# Patient Record
Sex: FEMALE | Race: WHITE | NOT HISPANIC OR LATINO | Employment: OTHER | URBAN - METROPOLITAN AREA
[De-identification: names, ages, dates, MRNs, and addresses within clinical notes are randomized per-mention and may not be internally consistent; named-entity substitution may affect disease eponyms.]

---

## 2017-04-28 ENCOUNTER — CONVERSION ENCOUNTER (OUTPATIENT)
Dept: OTHER | Facility: HOSPITAL | Age: 77
End: 2017-04-28

## 2018-04-19 ENCOUNTER — OFFICE VISIT CONVERTED (OUTPATIENT)
Dept: FAMILY MEDICINE CLINIC | Age: 78
End: 2018-04-19
Attending: FAMILY MEDICINE

## 2018-05-16 ENCOUNTER — CONVERSION ENCOUNTER (OUTPATIENT)
Dept: OTHER | Facility: HOSPITAL | Age: 78
End: 2018-05-16

## 2019-05-07 ENCOUNTER — OFFICE VISIT CONVERTED (OUTPATIENT)
Dept: FAMILY MEDICINE CLINIC | Age: 79
End: 2019-05-07
Attending: FAMILY MEDICINE

## 2019-05-07 ENCOUNTER — HOSPITAL ENCOUNTER (OUTPATIENT)
Dept: OTHER | Facility: HOSPITAL | Age: 79
Discharge: HOME | End: 2019-05-07
Attending: FAMILY MEDICINE

## 2019-05-08 LAB
ANION GAP SERPL CALC-SCNC: 20 MMOL/L (ref 8–19)
BUN SERPL-MCNC: 35 MG/DL (ref 5–25)
BUN/CREAT SERPL: 24 {RATIO} (ref 6–20)
CALCIUM SERPL-MCNC: 9.9 MG/DL (ref 8.7–10.4)
CHLORIDE SERPL-SCNC: 104 MMOL/L (ref 99–111)
CONV CO2: 24 MMOL/L (ref 22–32)
CREAT UR-MCNC: 1.47 MG/DL (ref 0.5–0.9)
GFR SERPLBLD BASED ON 1.73 SQ M-ARVRAT: 33 ML/MIN/{1.73_M2}
GLUCOSE SERPL-MCNC: 105 MG/DL (ref 65–99)
MAGNESIUM SERPL-MCNC: 1.95 MG/DL (ref 1.6–2.3)
OSMOLALITY SERPL CALC.SUM OF ELEC: 304 MOSM/KG (ref 273–304)
POTASSIUM SERPL-SCNC: 4.7 MMOL/L (ref 3.5–5.3)
SODIUM SERPL-SCNC: 143 MMOL/L (ref 135–147)

## 2019-05-16 ENCOUNTER — CONVERSION ENCOUNTER (OUTPATIENT)
Dept: CARDIOLOGY | Facility: CLINIC | Age: 79
End: 2019-05-16
Attending: SPECIALIST

## 2019-05-23 ENCOUNTER — HOSPITAL ENCOUNTER (OUTPATIENT)
Dept: OTHER | Facility: HOSPITAL | Age: 79
Discharge: HOME | End: 2019-05-23
Attending: FAMILY MEDICINE

## 2019-05-23 LAB
ANION GAP SERPL CALC-SCNC: 18 MMOL/L (ref 8–19)
APPEARANCE UR: CLEAR
BACTERIA UR QL AUTO: ABNORMAL
BILIRUB UR QL: ABNORMAL
BUN SERPL-MCNC: 18 MG/DL (ref 5–25)
BUN/CREAT SERPL: 16 {RATIO} (ref 6–20)
CALCIUM SERPL-MCNC: 9.5 MG/DL (ref 8.7–10.4)
CASTS URNS QL MICRO: ABNORMAL /[LPF]
CHLORIDE SERPL-SCNC: 105 MMOL/L (ref 99–111)
COLOR UR: ABNORMAL
CONV CO2: 25 MMOL/L (ref 22–32)
CONV LEUKOCYTE ESTERASE: NEGATIVE
CONV UROBILINOGEN IN URINE BY AUTOMATED TEST STRIP: 0.2 {EHRLICHU}/DL (ref 0.1–1)
CREAT UR-MCNC: 1.1 MG/DL (ref 0.5–0.9)
EPI CELLS #/AREA URNS HPF: ABNORMAL /[HPF]
GFR SERPLBLD BASED ON 1.73 SQ M-ARVRAT: 47 ML/MIN/{1.73_M2}
GLUCOSE 24H UR-MCNC: NEGATIVE MG/DL
GLUCOSE SERPL-MCNC: 124 MG/DL (ref 65–99)
HGB UR QL STRIP: ABNORMAL
KETONES UR QL STRIP: NEGATIVE MG/DL
MUCOUS THREADS URNS QL MICRO: ABNORMAL
NITRITE UR-MCNC: NEGATIVE MG/ML
OSMOLALITY SERPL CALC.SUM OF ELEC: 301 MOSM/KG (ref 273–304)
PH UR STRIP.AUTO: 5.5 [PH] (ref 5–8)
POTASSIUM SERPL-SCNC: 4.2 MMOL/L (ref 3.5–5.3)
PROT UR-MCNC: 30 MG/DL
RBC # BLD AUTO: ABNORMAL /[HPF]
SODIUM SERPL-SCNC: 144 MMOL/L (ref 135–147)
SP GR UR STRIP: >=1.03 (ref 1–1.03)
SPECIMEN SOURCE: ABNORMAL
UNIDENT CRYS URNS QL MICRO: ABNORMAL /[HPF]
WBC #/AREA URNS HPF: ABNORMAL /[HPF]

## 2020-01-13 ENCOUNTER — HOSPITAL ENCOUNTER (OUTPATIENT)
Dept: OTHER | Facility: HOSPITAL | Age: 80
Discharge: HOME | End: 2020-01-13
Attending: FAMILY MEDICINE

## 2020-01-13 ENCOUNTER — OFFICE VISIT CONVERTED (OUTPATIENT)
Dept: FAMILY MEDICINE CLINIC | Age: 80
End: 2020-01-13
Attending: FAMILY MEDICINE

## 2020-01-13 LAB
ANION GAP SERPL CALC-SCNC: 19 MMOL/L (ref 8–19)
BUN SERPL-MCNC: 17 MG/DL (ref 5–25)
BUN/CREAT SERPL: 19 {RATIO} (ref 6–20)
CALCIUM SERPL-MCNC: 9.8 MG/DL (ref 8.7–10.4)
CHLORIDE SERPL-SCNC: 101 MMOL/L (ref 99–111)
CONV CO2: 24 MMOL/L (ref 22–32)
CREAT UR-MCNC: 0.91 MG/DL (ref 0.5–0.9)
GFR SERPLBLD BASED ON 1.73 SQ M-ARVRAT: 59 ML/MIN/{1.73_M2}
GLUCOSE SERPL-MCNC: 118 MG/DL (ref 65–99)
OSMOLALITY SERPL CALC.SUM OF ELEC: 293 MOSM/KG (ref 273–304)
POTASSIUM SERPL-SCNC: 4.4 MMOL/L (ref 3.5–5.3)
SODIUM SERPL-SCNC: 140 MMOL/L (ref 135–147)

## 2020-08-14 ENCOUNTER — HOSPITAL ENCOUNTER (OUTPATIENT)
Dept: OTHER | Facility: HOSPITAL | Age: 80
Discharge: HOME | End: 2020-08-14
Attending: PHYSICIAN ASSISTANT

## 2020-08-14 ENCOUNTER — OFFICE VISIT CONVERTED (OUTPATIENT)
Dept: FAMILY MEDICINE CLINIC | Age: 80
End: 2020-08-14
Attending: FAMILY MEDICINE

## 2020-08-14 DIAGNOSIS — G89.29 BILATERAL CHRONIC KNEE PAIN: Primary | ICD-10-CM

## 2020-08-14 DIAGNOSIS — M25.562 BILATERAL CHRONIC KNEE PAIN: Primary | ICD-10-CM

## 2020-08-14 DIAGNOSIS — M25.561 BILATERAL CHRONIC KNEE PAIN: Primary | ICD-10-CM

## 2020-08-14 LAB
ALBUMIN SERPL-MCNC: 4.6 G/DL (ref 3.5–5)
ALBUMIN/GLOB SERPL: 1.7 {RATIO} (ref 1.4–2.6)
ALP SERPL-CCNC: 59 U/L (ref 43–160)
ALT SERPL-CCNC: 13 U/L (ref 10–40)
ANION GAP SERPL CALC-SCNC: 18 MMOL/L (ref 8–19)
AST SERPL-CCNC: 23 U/L (ref 15–50)
BILIRUB SERPL-MCNC: 0.28 MG/DL (ref 0.2–1.3)
BUN SERPL-MCNC: 22 MG/DL (ref 5–25)
BUN/CREAT SERPL: 18 {RATIO} (ref 6–20)
CALCIUM SERPL-MCNC: 10.2 MG/DL (ref 8.7–10.4)
CHLORIDE SERPL-SCNC: 99 MMOL/L (ref 99–111)
CONV CO2: 26 MMOL/L (ref 22–32)
CONV TOTAL PROTEIN: 7.3 G/DL (ref 6.3–8.2)
CREAT UR-MCNC: 1.22 MG/DL (ref 0.5–0.9)
ERYTHROCYTE [DISTWIDTH] IN BLOOD BY AUTOMATED COUNT: 12.4 % (ref 11.5–14.5)
GFR SERPLBLD BASED ON 1.73 SQ M-ARVRAT: 41 ML/MIN/{1.73_M2}
GLOBULIN UR ELPH-MCNC: 2.7 G/DL (ref 2–3.5)
GLUCOSE SERPL-MCNC: 88 MG/DL (ref 65–99)
HBA1C MFR BLD: 12.5 G/DL (ref 12–16)
HCT VFR BLD AUTO: 37.2 % (ref 37–47)
MCH RBC QN AUTO: 30.9 PG (ref 27–31)
MCHC RBC AUTO-ENTMCNC: 33.6 G/DL (ref 33–37)
MCV RBC AUTO: 91.9 FL (ref 81–99)
OSMOLALITY SERPL CALC.SUM OF ELEC: 291 MOSM/KG (ref 273–304)
PLATELET # BLD AUTO: 203 10*3/UL (ref 130–400)
PMV BLD AUTO: 10.7 FL (ref 7.4–10.4)
POTASSIUM SERPL-SCNC: 4 MMOL/L (ref 3.5–5.3)
RBC # BLD AUTO: 4.05 10*6/UL (ref 4.2–5.4)
SODIUM SERPL-SCNC: 139 MMOL/L (ref 135–147)
WBC # BLD AUTO: 6.2 10*3/UL (ref 4.8–10.8)

## 2020-08-17 ENCOUNTER — HOSPITAL ENCOUNTER (OUTPATIENT)
Dept: OTHER | Facility: HOSPITAL | Age: 80
Discharge: HOME | End: 2020-08-17
Attending: FAMILY MEDICINE

## 2020-08-17 NOTE — ADDENDUM NOTE
Addended by: EAGLE BOATENG on: 8/17/2020 03:03 PM     Modules accepted: Orders     EOAE (evoked otoacoustic emission)

## 2020-08-19 ENCOUNTER — OFFICE VISIT (OUTPATIENT)
Dept: ORTHOPEDIC SURGERY | Facility: CLINIC | Age: 80
End: 2020-08-19

## 2020-08-19 VITALS — HEIGHT: 65 IN | TEMPERATURE: 97.5 F | BODY MASS INDEX: 30.16 KG/M2 | WEIGHT: 181 LBS

## 2020-08-19 DIAGNOSIS — M25.561 BILATERAL CHRONIC KNEE PAIN: ICD-10-CM

## 2020-08-19 DIAGNOSIS — M17.12 PRIMARY OSTEOARTHRITIS OF LEFT KNEE: Primary | ICD-10-CM

## 2020-08-19 DIAGNOSIS — M25.562 BILATERAL CHRONIC KNEE PAIN: ICD-10-CM

## 2020-08-19 DIAGNOSIS — M17.11 PRIMARY OSTEOARTHRITIS OF RIGHT KNEE: ICD-10-CM

## 2020-08-19 DIAGNOSIS — G89.29 BILATERAL CHRONIC KNEE PAIN: ICD-10-CM

## 2020-08-19 PROCEDURE — 99204 OFFICE O/P NEW MOD 45 MIN: CPT | Performed by: PHYSICIAN ASSISTANT

## 2020-08-19 NOTE — PROGRESS NOTES
NEW VISIT    Patient: Violetta Starr  ?  YOB: 1940    MRN: [id number]  ?  Chief Complaint   Patient presents with   • Left Knee - Pain, Establish Care   • Right Knee - Pain, Establish Care      ?  HPI:   Violetta Starr is a 80 y.o. female who presents with complaint of bilateral knee pain She reports the left knee is worse than the right knee and progressively worsening. She comes to our office for a second opinion after seeing Dr. Iyer. She had been receiving intraarticular steroid injections every 3 months with some relief of her symptoms, but only lasting for about 2 weeks. She is now experiencing pain in the middle of the night. She states she is also on meloxicam but it is not helping much.     Pain Location: bilateral knee, left greater right  Radiation: medial face of the tibia  Quality: aching, stabbing, burning  Intensity/Severity: moderate to severe  Duration: 4-5 years  Progression of symptoms: yes, progressive worsening  Onset quality: gradual   Timing: intermittent  Aggravating Factors: walking, standing  Alleviating Factors: steroid injection (intra-articular), rest  Previous Episodes: yes  Associated Symptoms: pain, swelling, clicking/popping  ADLs Affected: ambulating, recreational activities/sports  Previous Treatment: NSAIDs, brace and intra-articular injection      This patient is a new patient.  This problem is new to this examiner.      Allergies: No Known Allergies    Medications:   Home Medications:  Current Outpatient Medications on File Prior to Visit   Medication Sig   • ATENOLOL PO Take  by mouth.   • LORazepam (ATIVAN) 0.5 MG tablet Take 0.5 mg by mouth Every 8 (Eight) Hours As Needed for Anxiety.   • meloxicam (MOBIC) 7.5 MG tablet Take 7.5 mg by mouth Daily.     No current facility-administered medications on file prior to visit.      Current Medications:  Scheduled Meds:  PRN Meds:.    I have reviewed the patient's medical history in detail and updated the computerized  "patient record.  Review and summarization of old records include:    Past Medical History:   Diagnosis Date   • Anxiety    • GERD (gastroesophageal reflux disease)    • Hypertension    • Insomnia    • Uterine cancer (CMS/HCC) 06/2016     Past Surgical History:   Procedure Laterality Date   • HYSTERECTOMY       Social History     Occupational History   • Not on file   Tobacco Use   • Smoking status: Never Smoker   • Smokeless tobacco: Never Used   Substance and Sexual Activity   • Alcohol use: Never     Frequency: Never   • Drug use: Never   • Sexual activity: Defer     Family History   Problem Relation Age of Onset   • Cancer Mother    • Hypertension Other          Review of Systems  Constitutional: Negative.  Negative for fever.   Eyes: Negative.    Respiratory: Negative.    Cardiovascular: Negative.    Endocrine: Negative.    Musculoskeletal: Positive for arthralgias, gait problem and joint swelling.   Skin: Negative.  Negative for rash and wound.   Allergic/Immunologic: Negative.    Neurological: Negative for numbness.   Hematological: Negative.    Psychiatric/Behavioral: Negative.         Wt Readings from Last 3 Encounters:   08/19/20 82.1 kg (181 lb)     Ht Readings from Last 3 Encounters:   08/19/20 165.1 cm (65\")     Body mass index is 30.12 kg/m².  Facility age limit for growth percentiles is 20 years.  Vitals:    08/19/20 0936   Temp: 97.5 °F (36.4 °C)         Physical Exam  Constitutional: Patient is oriented to person, place, and time. Appears well-developed and well-nourished.   HENT:   Head: Normocephalic and atraumatic.   Eyes: Conjunctivae and EOM are normal. Pupils are equal, round, and reactive to light.   Cardiovascular: Normal rate and intact distal pulses.   Pulmonary/Chest: Effort normal.   Musculoskeletal:   See detailed exam below   Neurological: Alert and oriented to person, place, and time. No sensory deficit. Coordination normal.   Skin: Skin is warm and dry. Capillary refill takes less " than 2 seconds. No rash noted. No erythema.   Psychiatric: Patient has a normal mood and affect. Her behavior is normal. Judgment and thought content normal.   Nursing note and vitals reviewed.      Ortho Exam:   BILATERAL knee:  There is moderate joint line tenderness at the medial aspect of the knee. The knee has a varus orientation.   Positive for crepitus throughout range of motion.   Positive for mild effusion.  Findings are consistent with synovitis and effusion.    Positive patellar grind test.   Negative Lachman test.    Negative anterior and posterior drawer.  Range of motion in extension and flexion is: 0-110 degrees.  Neurovascular status is intact.  Dorsalis pedis and posterior tibial artery pulses are palpable. Common peroneal nerve function is well preserved.   Gait is cautious and antalgic.        Diagnostics:  Bilateral knee xrays 3 views were ordered by Ajith Knowles PA-C and performed at [facility] on 8/14/2020. These images were independently viewed and interpreted by myself, my impression as follows:  Findings:   Right knee: Advanced medial joint space narrowing with bone-on-bone articulation and osteophyte lipping, moderate degenerative change of lateral and patellofemoral compartments.  Left knee: Moderate to severe tricompartmental osteoarthritis, moderate to severe narrowing of the medial joint space with near bone-on-bone articulation, moderate degenerative change of the lateral joint space, moderate narrowing of patellofemoral compartment.  Bony lesion: no  Soft tissues: within normal limits  Joint spaces: decreased  Hardware appropriately positioned: not applicable  Prior studies available for comparison: yes, right knee from 2015 which shows progression of arthritis from 2015 to today           Assessment:  Violetta was seen today for pain, establish care, pain and establish care.    Diagnoses and all orders for this visit:    Primary osteoarthritis of left  knee  -     External [facility] Surgical/Procedural Request  -     Follow Anesthesia Guidelines / Protocol; Future  -     Obtain informed consent  -     Urinalysis With Culture If Indicated -; Future  -     Care Order / Instruction for all Female Patients    Bilateral chronic knee pain    Primary osteoarthritis of right knee            Plan    Orders Placed This Encounter   Procedures   • External [facility] Surgical/Procedural Request   • Urinalysis With Culture If Indicated -   • Follow Anesthesia Guidelines / Protocol   • Obtain informed consent   • Care Order / Instruction for all Female Patients   · Management of chronic condition with acute exacerbation or progression of symptoms    · Discussion of orthopaedic goals and activities.  · Risk, benefits, and merits of treatment alternatives reviewed with the patient. Discussed continued steroid injections at a higher dose visco supplementation, or proceeding with a total knee arthroplasty. She is interested in having the knee replaced.   The patient was seen today for preoperative discussion.  The patient has been tried on over-the-counter and prescription NSAID's despite the risks of anti-inflammatory bleeding, peptic ulcers and erosive gastritis with short term benefit only.  Braces have been prescribed for mechanical support.  Patient has been participating in an exercise program specifically targeting joint pain relief with limited benefit. Intraarticular injections have been used periodically with some but not complete relief of pain.  Ambulation aids have also been utilized.    · The details of the surgical procedure were explained including the location of probable incisions and a description of the likely hardware/grafts to be used. The patient understands the likely convalescence after surgery as well as the rehabilitation required.  Also, we have thoroughly discussed with the patient the risks, benefits and alternatives to surgery.  Risks  include but are not limited to the risk of infection, joint stiffness, limited range of motion, wound healing problems, scar tissue build up, myocardial infarction, stroke, blood clots (including DVT and/or pulmonary embolus along with the risk of death) neurologic and/or vascular injury, limb length discrepancy, fracture, dislocation, nonunion, malunion, continued pain and need for further surgery including hardware failure requiring revision.   · Ice, heat, and/or modalities as beneficial  · The nature of the proposed surgery reviewed with the patient including risks, benefits, rehabilitation, recovery timeframe, and outcome expectations.      Ajith Knowles PA-C  Date of encounter: 08/19/2020     Electronically signed by Ajith Knowles PA-C, 08/19/20, 9:39 AM.    EMR Dragon/Transcription disclaimer:  Much of this encounter note is an electronic transcription/translation of spoken language to printed text. The electronic translation of spoken language may permit erroneous, or at times, nonsensical words or phrases to be inadvertently transcribed; Although I have reviewed the note for such errors, some may still exist.

## 2020-08-21 ENCOUNTER — HOSPITAL ENCOUNTER (OUTPATIENT)
Dept: OTHER | Facility: HOSPITAL | Age: 80
Discharge: HOME | End: 2020-08-21
Attending: FAMILY MEDICINE

## 2020-08-24 RX ORDER — LORAZEPAM 0.5 MG/1
0.5 TABLET ORAL EVERY 8 HOURS PRN
COMMUNITY
End: 2022-06-16 | Stop reason: SDUPTHER

## 2020-08-24 RX ORDER — MELOXICAM 7.5 MG/1
7.5 TABLET ORAL DAILY
COMMUNITY
End: 2020-10-28

## 2020-08-27 PROBLEM — G89.29 BILATERAL CHRONIC KNEE PAIN: Status: ACTIVE | Noted: 2020-08-27

## 2020-08-27 PROBLEM — M17.12 PRIMARY OSTEOARTHRITIS OF LEFT KNEE: Status: ACTIVE | Noted: 2020-08-27

## 2020-08-27 PROBLEM — M17.11 PRIMARY OSTEOARTHRITIS OF RIGHT KNEE: Status: ACTIVE | Noted: 2020-08-27

## 2020-08-27 PROBLEM — M25.561 BILATERAL CHRONIC KNEE PAIN: Status: ACTIVE | Noted: 2020-08-27

## 2020-08-27 PROBLEM — M25.562 BILATERAL CHRONIC KNEE PAIN: Status: ACTIVE | Noted: 2020-08-27

## 2020-10-19 ENCOUNTER — OUTSIDE FACILITY SERVICE (OUTPATIENT)
Dept: ORTHOPEDIC SURGERY | Facility: CLINIC | Age: 80
End: 2020-10-19

## 2020-10-19 PROCEDURE — 27447 TOTAL KNEE ARTHROPLASTY: CPT | Performed by: ORTHOPAEDIC SURGERY

## 2020-10-28 DIAGNOSIS — Z96.652 S/P TKR (TOTAL KNEE REPLACEMENT), LEFT: Primary | ICD-10-CM

## 2020-10-28 PROBLEM — C54.1 ENDOMETRIAL CANCER: Status: ACTIVE | Noted: 2020-10-28

## 2020-10-28 RX ORDER — ATENOLOL 50 MG/1
25 TABLET ORAL DAILY
COMMUNITY
Start: 2020-08-16 | End: 2021-07-08 | Stop reason: SDUPTHER

## 2020-10-28 RX ORDER — TRAZODONE HYDROCHLORIDE 50 MG/1
50 TABLET ORAL NIGHTLY
COMMUNITY
Start: 2020-08-14 | End: 2021-07-08

## 2020-10-28 RX ORDER — OXYCODONE HYDROCHLORIDE AND ACETAMINOPHEN 5; 325 MG/1; MG/1
TABLET ORAL
COMMUNITY
Start: 2020-10-20 | End: 2020-10-30 | Stop reason: SDUPTHER

## 2020-10-28 RX ORDER — AMLODIPINE BESYLATE 5 MG/1
5 TABLET ORAL DAILY
COMMUNITY
Start: 2020-09-01 | End: 2021-07-08 | Stop reason: SDUPTHER

## 2020-10-28 RX ORDER — MELOXICAM 15 MG/1
7.5 TABLET ORAL DAILY
COMMUNITY
Start: 2020-08-24 | End: 2021-07-08 | Stop reason: SDUPTHER

## 2020-10-28 RX ORDER — RIVAROXABAN 10 MG/1
TABLET, FILM COATED ORAL
COMMUNITY
Start: 2020-10-27 | End: 2021-07-08

## 2020-10-28 RX ORDER — LOSARTAN POTASSIUM 100 MG/1
100 TABLET ORAL DAILY
COMMUNITY
Start: 2020-08-14 | End: 2021-07-08 | Stop reason: SDUPTHER

## 2020-10-28 RX ORDER — OMEPRAZOLE 40 MG/1
40 CAPSULE, DELAYED RELEASE ORAL DAILY
COMMUNITY
Start: 2020-08-14 | End: 2021-07-08 | Stop reason: SDUPTHER

## 2020-10-30 ENCOUNTER — OFFICE VISIT (OUTPATIENT)
Dept: ORTHOPEDIC SURGERY | Facility: CLINIC | Age: 80
End: 2020-10-30

## 2020-10-30 ENCOUNTER — HOSPITAL ENCOUNTER (OUTPATIENT)
Dept: OTHER | Facility: HOSPITAL | Age: 80
Discharge: HOME | End: 2020-10-30
Attending: PHYSICIAN ASSISTANT

## 2020-10-30 VITALS — BODY MASS INDEX: 29.99 KG/M2 | WEIGHT: 180 LBS | HEIGHT: 65 IN | TEMPERATURE: 98 F

## 2020-10-30 DIAGNOSIS — Z96.652 S/P TKR (TOTAL KNEE REPLACEMENT), LEFT: Primary | ICD-10-CM

## 2020-10-30 PROCEDURE — 99024 POSTOP FOLLOW-UP VISIT: CPT | Performed by: PHYSICIAN ASSISTANT

## 2020-10-31 RX ORDER — OXYCODONE HYDROCHLORIDE AND ACETAMINOPHEN 5; 325 MG/1; MG/1
TABLET ORAL
Qty: 40 TABLET | Refills: 0 | Status: SHIPPED | OUTPATIENT
Start: 2020-10-31 | End: 2020-11-20 | Stop reason: SDUPTHER

## 2020-11-03 PROBLEM — Z96.652 S/P TKR (TOTAL KNEE REPLACEMENT), LEFT: Status: ACTIVE | Noted: 2020-11-03

## 2020-11-03 NOTE — PROGRESS NOTES
POST OPERATIVE FOLLOW UP ([facility])      NAME: Violetta Starr           : 1940    MRN: [id number]      Chief Complaint   Patient presents with   • Left Knee - Follow-up, Pain   • Post-op Knee     Date of Surgery: 10/19/2029  ?     HPI  Violetta Starr presents for 11 day postoperative follow up s/p left total knee arthroplasty performed by Charan Payne MD. The patient has had a relatively normal postoperative course.  The patient has had no current complaints. The patient has had improving normal postoperative pain.  The patient has had no issues with the wound.         Physical Exam  General: alert, appears stated age, cooperative and no distress  Incision/Skin: healing well, no drainage, no erythema, no seroma, no swelling, incision well approximated  Musculoskeletal:   Calf is soft and nontender with a negative Homans sign. Appropriate amounts of swelling and bruising are noted.  There is no clicking, popping or catching. There is no instability of the components.   Anterior and posterior drawer signs are negative.   Dorsalis pedis and posterior tibial artery pulses are palpable.   Common peroneal nerve function is well preserved.   Range of motion is 0-75 degrees of flexion.  Gait is cautious but otherwise fairly normal.       Diagnostic Data:  Left knee xrays 3 views were ordered by Ajith Knowles PA-C and performed at [facility] on 10/30/20. These images were independently viewed and interpreted by myself, my impression as follows:  Findings: Well positioned implant with good cement mantle  Bony Lesion: No  Soft tissues: within normal limits  Joint spaces: WNL  Hardware appropriately positioned: yes  Prior studies available for comparison: yes          Assessment/Plan   Assessment:    1. S/P TKR (total knee replacement), left          Plan:       • Wound care instructions given  • Ice and/or modalities as beneficial  • Watch for signs and symptoms of infection  • Elevate leg for  residual swelling  • Physical therapy program ongoing  • Weight bearing parameters reviewed  • Take prescribed medications as instructed, but only as tolerated  • Aftercare and dental prophylaxis for joint replacement surgery.  • Discussion of orthopaedic goals and activities and patient and/or guardian expressed appreciation.  • Follow up in 4-6 weeks       Ajith Knowles PA-C  10/30/2020     Electronically signed by Ajith Knowles PA-C, 11/03/20, 8:08 AM EST.    EMR Dragon/Transcription disclaimer:  Much of this encounter note is an electronic transcription/translation of spoken language to printed text. The electronic translation of spoken language may permit erroneous, or at times, nonsensical words or phrases to be inadvertently transcribed; Although I have reviewed the note for such errors, some may still exist.

## 2020-11-20 DIAGNOSIS — Z96.652 S/P TKR (TOTAL KNEE REPLACEMENT), LEFT: ICD-10-CM

## 2020-11-21 RX ORDER — OXYCODONE HYDROCHLORIDE AND ACETAMINOPHEN 5; 325 MG/1; MG/1
TABLET ORAL
Qty: 40 TABLET | Refills: 0 | Status: SHIPPED | OUTPATIENT
Start: 2020-11-21 | End: 2021-07-08

## 2020-12-10 ENCOUNTER — OFFICE VISIT (OUTPATIENT)
Dept: ORTHOPEDIC SURGERY | Facility: CLINIC | Age: 80
End: 2020-12-10

## 2020-12-10 VITALS — BODY MASS INDEX: 26.99 KG/M2 | TEMPERATURE: 96.3 F | HEIGHT: 65 IN | WEIGHT: 162 LBS

## 2020-12-10 DIAGNOSIS — Z96.652 S/P TKR (TOTAL KNEE REPLACEMENT), LEFT: Primary | ICD-10-CM

## 2020-12-10 PROCEDURE — 99024 POSTOP FOLLOW-UP VISIT: CPT | Performed by: ORTHOPAEDIC SURGERY

## 2020-12-10 RX ORDER — INFLUENZA A VIRUS A/MICHIGAN/45/2015 X-275 (H1N1) ANTIGEN (FORMALDEHYDE INACTIVATED), INFLUENZA A VIRUS A/SINGAPORE/INFIMH-16-0019/2016 IVR-186 (H3N2) ANTIGEN (FORMALDEHYDE INACTIVATED), INFLUENZA B VIRUS B/PHUKET/3073/2013 ANTIGEN (FORMALDEHYDE INACTIVATED), AND INFLUENZA B VIRUS B/MARYLAND/15/2016 BX-69A ANTIGEN (FORMALDEHYDE INACTIVATED) 60; 60; 60; 60 UG/.7ML; UG/.7ML; UG/.7ML; UG/.7ML
INJECTION, SUSPENSION INTRAMUSCULAR
COMMUNITY
Start: 2020-11-11 | End: 2021-09-21

## 2020-12-10 NOTE — PROGRESS NOTES
"POST OP TOTAL [facility]LOBAL      NAME: Violetta Starr           : 1940    MRN: [id number]    Chief Complaint   Patient presents with   • Left Knee - Follow-up, Post-op       Date of Surgery: 10/19/2020  ?  HPI:   Patient returns today for 8 week follow up of left total knee arthroplasty Incision(s) healed nicely with no signs of infection. Patient reports doing well with no unusual complaints. No fevers, rigors or chills. Appears to be progressing appropriately. Patient is on appropriate anticoagulation.  She has done extremely well postoperatively.  \"You did a great job on my knee \".  She states that she is looking forward to walking for exercise.  She has completed her course of physical therapy at this point.      Review of Systems:      Ortho Exam:   Left knee.The patient is status post total knee arthroplasty postoperative 8 week(s). Incision is clean. Calf is soft and nontender. Homans sign is negative. There is no clicking, popping or catching. Anterior and posterior drawer signs are negative.  There is no instability of the components. Appropriate amounts of swelling and bruising are noted. Dorsalis pedis and posterior tibial artery pulses are palpable. Common peroneal nerve function is well preserved. Range of motion is from 0-100 degrees of flexion. Gait is cautious but otherwise fairly normal. There is no evidence of a deep seated joint infection.      Diagnostic Studies:  no diagnostic testing performed this visit        Assessment:  Diagnoses and all orders for this visit:    1. S/P TKR (total knee replacement), left (Primary)            Plan     · To use ACE wrap/TEO stocking  · Continue ice to joint   · Stretching and strengthening exercises  · Aggressive ROM  · Falls precautions  · You may now resume any prescription medications you were taking prior to surgery  · Continue with lifelong antibiotic prophylaxis with dental procedures following total joint replacement.  · Follow up in 1 year(s)    Date " of encounter: 12/10/2020   Charan Payne MD

## 2021-03-08 ENCOUNTER — HOSPITAL ENCOUNTER (OUTPATIENT)
Dept: VACCINE CLINIC | Facility: HOSPITAL | Age: 81
Discharge: HOME | End: 2021-03-08
Attending: INTERNAL MEDICINE

## 2021-03-15 ENCOUNTER — OFFICE VISIT CONVERTED (OUTPATIENT)
Dept: FAMILY MEDICINE CLINIC | Age: 81
End: 2021-03-15
Attending: FAMILY MEDICINE

## 2021-03-15 ENCOUNTER — HOSPITAL ENCOUNTER (OUTPATIENT)
Dept: OTHER | Facility: HOSPITAL | Age: 81
Discharge: HOME | End: 2021-03-15
Attending: FAMILY MEDICINE

## 2021-03-15 LAB
ALBUMIN SERPL-MCNC: 4.4 G/DL (ref 3.5–5)
ALBUMIN/GLOB SERPL: 1.6 {RATIO} (ref 1.4–2.6)
ALP SERPL-CCNC: 65 U/L (ref 43–160)
ALT SERPL-CCNC: 10 U/L (ref 10–40)
ANION GAP SERPL CALC-SCNC: 14 MMOL/L (ref 8–19)
APPEARANCE UR: CLEAR
AST SERPL-CCNC: 20 U/L (ref 15–50)
BACTERIA UR QL AUTO: ABNORMAL
BILIRUB SERPL-MCNC: 0.26 MG/DL (ref 0.2–1.3)
BILIRUB UR QL: NEGATIVE
BUN SERPL-MCNC: 23 MG/DL (ref 5–25)
BUN/CREAT SERPL: 20 {RATIO} (ref 6–20)
CALCIUM SERPL-MCNC: 9.8 MG/DL (ref 8.7–10.4)
CASTS URNS QL MICRO: ABNORMAL /[LPF]
CHLORIDE SERPL-SCNC: 106 MMOL/L (ref 99–111)
COLOR UR: YELLOW
CONV CO2: 26 MMOL/L (ref 22–32)
CONV LEUKOCYTE ESTERASE: NEGATIVE
CONV TOTAL PROTEIN: 7.2 G/DL (ref 6.3–8.2)
CONV UROBILINOGEN IN URINE BY AUTOMATED TEST STRIP: 0.2 {EHRLICHU}/DL (ref 0.1–1)
CREAT UR-MCNC: 1.16 MG/DL (ref 0.5–0.9)
EPI CELLS #/AREA URNS HPF: ABNORMAL /[HPF]
GFR SERPLBLD BASED ON 1.73 SQ M-ARVRAT: 44 ML/MIN/{1.73_M2}
GLOBULIN UR ELPH-MCNC: 2.8 G/DL (ref 2–3.5)
GLUCOSE 24H UR-MCNC: NEGATIVE MG/DL
GLUCOSE SERPL-MCNC: 93 MG/DL (ref 65–99)
HGB UR QL STRIP: ABNORMAL
KETONES UR QL STRIP: NEGATIVE MG/DL
MUCOUS THREADS URNS QL MICRO: ABNORMAL
NITRITE UR-MCNC: NEGATIVE MG/ML
OSMOLALITY SERPL CALC.SUM OF ELEC: 297 MOSM/KG (ref 273–304)
PH UR STRIP.AUTO: 5.5 [PH] (ref 5–8)
POTASSIUM SERPL-SCNC: 4 MMOL/L (ref 3.5–5.3)
PROT UR-MCNC: NEGATIVE MG/DL
RBC # BLD AUTO: ABNORMAL /[HPF]
SODIUM SERPL-SCNC: 142 MMOL/L (ref 135–147)
SP GR UR STRIP: >=1.03 (ref 1–1.03)
SPECIMEN SOURCE: ABNORMAL
UNIDENT CRYS URNS QL MICRO: ABNORMAL /[HPF]
WBC #/AREA URNS HPF: ABNORMAL /[HPF]

## 2021-03-29 ENCOUNTER — HOSPITAL ENCOUNTER (OUTPATIENT)
Dept: VACCINE CLINIC | Facility: HOSPITAL | Age: 81
Discharge: HOME | End: 2021-03-29
Attending: INTERNAL MEDICINE

## 2021-05-18 NOTE — PROGRESS NOTES
Violetta Starr 1940     Office/Outpatient Visit    Visit Date: Tue, May 7, 2019 11:01 am    Provider: Antoni Gonzalez MD (Assistant: Heather Russ MA)    Location: [facility]        Electronically signed by Antoni Gonzalez MD on  05/08/2019 04:52:01 PM                             SUBJECTIVE:        CC: physical exam, blood pressure, GERD, anxiety         HPI:         Ms. Starr is here for a Medicare wellness visit.  The required HRA questions are integrated within this visit note. Family medical history and individual medical/surgical history were reviewed and updated.  A current height, weight, BMI, blood pressure, and pulse were recorded in the vitals section of the note and have been reviewed. Patient's medications, including supplements, were recorded in the chart and reviewed.  Current providers and suppliers were reviewed and updated.          Self-Assessment of Health: She rates her health as very good. She rates her confidence of being able to control/manage most of her health problems as very confident. Her physical/emotional health has limited her social activites not at all.  A review of cognitive impairment was performed, including ability to drive a car, manage finances, and any memory changes, and was found to be negative.  A review of functional ability, including bathing, dressing, walking, and urine/bowel continence as well as level of safety was performed and was found to be negative.  Falls Risk: Has fallen 2 or more times or had one fall with injury in the past year.  In regards to hearing she reports Concerning home safety, she reports that at home she DOES have adequate lighting, a skid resistant shower/tub, grab bars in the bath, handrails on stairs and functioning smoke alarms, but not absence of throw rugs.          Immunization Status: ** >10 years since last Td booster; ** Has not received influenza vaccine for this season; Age>60, no shingles vaccination; Physical  Activity: She never excercises.; Type of diet patient normally eats is described as well-balanced with fruits and vegetables         Violetta also has GERD for which she is on Pepcid and Prilosec.  She says that her symptoms continue to be well controlled.  She denies dysphagia.  She does well as long as she takes the medications and is cautious about her diet.          Violetta also has anxiety for which she takes Ativan rarely.  She has used that for a many years and uses it on a very limited basis.  She has not had any sign of addiction or abuse of that medication.  She would like to get it refilled.  It is helpful for her.  No concerning side effects, and risks are reviewed.  Christiano is consistent today.          With regard to the hypertension, her current cardiac medication regimen includes a beta-blocker ( Tenormin ) and an angiotensin receptor blocker ( Cozaar ).  She is tolerating the medication well without side effects.  Compliance with treatment has been good; she takes her medication as directed and follows up as directed.      ROS:     CONSTITUTIONAL:  Negative for chills and fever.      CARDIOVASCULAR:  Negative for chest pain and palpitations.      RESPIRATORY:  Negative for recent cough and dyspnea.      GASTROINTESTINAL:  Negative for abdominal pain, nausea and vomiting.      INTEGUMENTARY/BREAST:  Negative for atypical mole(s) and rash.          PMH/FMH/SH:     Last Reviewed on 5/07/2019 11:19 AM by Antoni Gonzalez    Past Medical History:                 PAST MEDICAL HISTORY         Hypertension     Gastroesophageal Reflux Disease     Endometrial cancer: S/P surgical resection; vaginal brachytherapy radiation complete 8/5/16;     Anxiety         CURRENT MEDICAL PROVIDERS:    Obstetrician/Gynecologist: Dr. Bernal    Oncologist: [facility]    Ophthalmologist: Wiggio (mekhi)    Orthopedist: Jaylon             ADVANCE DIRECTIVES: Living will  (Sam) or daughter  (Pam Duke)         PREVENTIVE HEALTH MAINTENANCE             BONE DENSITY: was last done 04/2016 with normal results     COLORECTAL CANCER SCREENING: Up to date (colonoscopy q10y; sigmoidoscopy q5y; Cologuard q3y) was last done 03/2014, Results are in chart; colonoscopy with normal results     EYE EXAM: was last done 4/21/2015 (Dr. 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          Alcohol:  Does not drink alcohol and never has.          Substance Abuse History:     Last Reviewed on 5/07/2019 11:19 AM by Antoni Gonzalez    NEGATIVE         Mental Health History:     Last Reviewed on 5/07/2019 11:19 AM by Antoni Gonzalez        Communicable Diseases (eg STDs):     Last Reviewed on 5/07/2019 11:19 AM by Antoni Gonzalez            Current Problems:     Last Reviewed on 5/07/2019 11:19 AM by Antoni Gonzalez    Mixed hyperlipidemia     Uterine cancer, of corpus uteri     Microscopic hematuria     Use of high risk medications     Anxiety     GERD     Hypertension         Immunizations:     zzPrevnar-13 3/13/2015     Pneomovax 2/11/2014     Influenza Virus Vaccine, unspecified formulation 1/1/2018         Allergies:     Last Reviewed on 5/07/2019 11:19 AM by Antoni Gonzalez    Amlodipine/Benazepril: cough    Losartan/Hydrochlorothiazide: cough    Lisinopril/Hydrochlorothiazide: cough    Brynn: cough        Current Medications:      Last Reviewed on 5/07/2019 11:19 AM by Antoni Gonzalez    Atenolol 50mg Tablet Take 1 tablet(s) by mouth daily     Cozaar 100mg Tablet Take 1 tablet(s) by mouth daily     Omeprazole 40mg Capsules, Extended Release Take 1 capsule(s) by mouth daily     Famotidine 20mg Tablet Take 1 tablet(s) by mouth bid     Lorazepam 0.5mg Tablet Take one tablet daily PRN     Trazodone HCl 50mg Tablet Take 1/2 to 1 tablet(s) by mouth at bedtime     Diphenhydramine HCl 25mg Tablet     magnesium 250mg daily     Meloxicam 15mg Tablet 1 tab daily     potassium 99mg daily     Calcium 600mg 1 tab daily         OBJECTIVE:        Vitals:         Current: 5/7/2019 11:05:09 AM    Ht:  5 ft, 4.5 in;  Wt: 175.2 lbs;  BMI: 29.6    T: 98 F (oral);  BP: 121/65 mm Hg (left arm, sitting);  P: 72 bpm (left arm (BP Cuff), sitting);  sCr: 1.04 mg/dL;  GFR: 46.51        Exams:     PHYSICAL EXAM:     GENERAL: vital signs recorded - well developed, well nourished;  no apparent distress;     EYES: extraocular movements intact; conjunctiva and cornea are normal; PERRLA; binocular vision is 20/20 - hearing is normal     E/N/T:  normal EACs, TMs, nasal/oral mucosa, teeth, gingiva, and oropharynx;     NECK: range of motion is normal; thyroid is non-palpable;     RESPIRATORY: normal respiratory rate and pattern with no distress; normal breath sounds with no rales, rhonchi, wheezes or rubs;     CARDIOVASCULAR: normal rate; rhythm is regular;  no systolic murmur; no edema;     GASTROINTESTINAL: nontender; normal bowel sounds; no organomegaly;     LYMPHATIC: no enlargement of cervical or facial nodes; no supraclavicular nodes;     BREAST/INTEGUMENT: SKIN: no significant rashes or lesions; no suspicious moles;     NEUROLOGIC: mental status: alert and oriented x 3; cranial nerves II-XII grossly intact;     PSYCHIATRIC: appropriate affect and demeanor; normal psychomotor function;         Lab/Test Results:             Amphetamines Screen, Urin:  Negative  (05/07/2019),     BAR-Barbiturates Screen, Urin:  Negative (05/07/2019),     Buprenorphine:  Negative (05/07/2019),     BZO-Benzodiazepines Screen,Ur:  Positive (05/07/2019),     Cocaine(Metab.)Screen, Ur:  Negative (05/07/2019),     MDMA-Ecstasy:  Negative (05/07/2019),     Met-Methamphetamine:  Negative (05/07/2019),     MTD-Methadone Screen, Urine:  Negative (05/07/2019),     Opiate Screen, Urine:  Negative (05/07/2019),     OXY-Oxycodone:  Negative (05/07/2019),     PCP-Phencyclidine Screen, Uri:  Negative (05/07/2019),     THC Cannabinoids Screen, Urin:  Negative (05/07/2019),     Urine temperature:  confirmed (05/07/2019),     Date and time of last pill:  lorazepam-5/6/19 @ 8pm/amyh (05/07/2019),     Performed by:  pr (05/07/2019),     Collection Time:  12:50 (05/07/2019),             ASSESSMENT           V70.0   Z00.01  Yearly physical exam              DDx:     530.81   K21.9  GERD              DDx:     V58.69   Z79.899  Use of high risk medications              DDx:     300.02   F41.8  Anxiety              DDx:     401.1   I10  Hypertension              DDx:     780.4   R42  Dizziness              DDx:         ORDERS:         Meds Prescribed:       Refill of: Atenolol 50mg Tablet Take 1 tablet(s) by mouth daily  #90 (Ninety) tablet(s) Refills: 3       Refill of: Cozaar (Losartan) 100mg Tablet Take 1 tablet(s) by mouth daily  #90 (Ninety) tablet(s) Refills: 3       Refill of: Omeprazole 40mg Capsules, Extended Release Take 1 capsule(s) by mouth daily  #90 (Ninety) capsule(s) Refills: 3       Refill of: Famotidine 20mg Tablet Take 1 tablet(s) by mouth bid  #180 (One [place] and Eighty) tablet(s) Refills: 3       Refill of: Lorazepam 0.5mg Tablet Take one tablet daily PRN  #30 (Thirty) tablet(s) Refills: 1       Refill of: Trazodone HCl 50mg Tablet Take 1/2 to 1 tablet(s) by mouth at bedtime  #90 (Ninety) tablet(s) Refills: 3         Radiology/Test Orders:       96991  Holter monitor connection and  disconnection  (Send-Out)  (Pt will come back for holter/amyh)       3014F  Screening mammography results documented and reviewed (PV)1  (In-House)         3017F  Colorectal CA screen results documented and reviewed (PV)  (In-House)           Lab Orders:       95098  BMP - [facility] Basic Metabolic Panel  (Send-Out)         83161  MG - [facility] Magnesium, Serum  (Send-Out)         81998  Drug test prsmv qual dir optical obs per day  (In-House)           Other Orders:         Depression screen negative  (In-House)         1100F  Pt screen for fall risk; document 2+ falls in the past yr or any fall w/injury in past year (DIAMOND)  (In-House)           Negative EtOH screen  (In-House)           Calculated BMI above the upper parameter and a follow-up plan was documented in the medical record  (In-House)           Subsequent Annual Well Visit Medicare (x1)                 PLAN:          Yearly physical exam         RECOMMENDATIONS given include: Today, we have reviewed Violetta's care.  I'm going to refill her medications and arrange additional testing as noted.  She has had some rare issue with feeling cold and clammy and light-headed.  We will arrange a Holter as a precaution.  Consider whether we need to reconsider the use of the beta blocker..  MIPS PHQ-9 Depression Screening Completed form scanned and in chart; Total Score 4 Negative alcohol screen     MAMMOGRAM: Done within last 2 years and results in are chart     COLORECTAL CANCER SCREENING: Results are in chart     BMI Elevated - Follow-Up Plan: She was provided education on weight loss strategies           Orders:         Depression screen negative  (In-House)         1100F  Pt screen for fall risk; document 2+ falls in the past yr or any fall w/injury in past year (DIAMOND)  (In-House)           Negative EtOH screen  (In-House)         3014F  Screening mammography results documented and reviewed (PV)1  (In-House)         3017F  Colorectal CA screen  results documented and reviewed (PV)  (In-House)           Calculated BMI above the upper parameter and a follow-up plan was documented in the medical record  (In-House)             Patient Education Handouts:       Physical Exam 60+ year, Female           GERD           Prescriptions:       Refill of: Omeprazole 40mg Capsules, Extended Release Take 1 capsule(s) by mouth daily  #90 (Ninety) capsule(s) Refills: 3       Refill of: Famotidine 20mg Tablet Take 1 tablet(s) by mouth bid  #180 (One [place] and Eighty) tablet(s) Refills: 3          Use of high risk medications As above.     LABORATORY:  Labs ordered to be performed today include Drug screen.            Orders:       [id number]  Drug test prsmv qual dir optical obs per day  (In-House)            Anxiety           Prescriptions:       Refill of: Lorazepam 0.5mg Tablet Take one tablet daily PRN  #30 (Thirty) tablet(s) Refills: 1       Refill of: Trazodone HCl 50mg Tablet Take 1/2 to 1 tablet(s) by mouth at bedtime  #90 (Ninety) tablet(s) Refills: 3          Hypertension     LABORATORY:  Labs ordered to be performed today include basic metabolic panel and Magnesium level.            Prescriptions:       Refill of: Atenolol 50mg Tablet Take 1 tablet(s) by mouth daily  #90 (Ninety) tablet(s) Refills: 3       Refill of: Cozaar (Losartan) 100mg Tablet Take 1 tablet(s) by mouth daily  #90 (Ninety) tablet(s) Refills: 3           Orders:       68050  BMP - [facility] Basic Metabolic Panel  (Send-Out)         15646  MG - [facility] Magnesium, Serum  (Send-Out)            Dizziness         TESTS/PROCEDURES:  Will proceed with Holter Monitor 24 hour to be performed/scheduled now.            Orders:       [id number]  Holter monitor connection and disconnection  (Send-Out)  (Pt will come back for holter/[facility])             CHARGE CAPTURE           **Please note: ICD descriptions below are intended for billing purposes only and may not represent clinical diagnoses**        Primary Diagnosis:          V70.0 Yearly physical exam            Z00.01    Encounter for general adult medical examination with abnormal findings              Orders:          03772   Preventive medicine, established patient, age 65+ years  (In-House)                                           Subsequent Annual Well Visit Medicare (x1)              Depression screen negative  (In-House)             1100F   Pt screen for fall risk; document 2+ falls in the past yr or any fall w/injury in past year (DIAMOND)  (In-House)                Negative EtOH screen  (In-House)             3014F   Screening mammography results documented and reviewed (PV)1  (In-House)             3017F   Colorectal CA screen results documented and reviewed (PV)  (In-House)                Calculated BMI above the upper parameter and a follow-up plan was documented in the medical record  (In-House)           530.81 GERD            K21.9    Gastro-esophageal reflux disease without esophagitis              Orders:          17016 -25  Office/outpatient visit; established patient, level 4  (In-House)           V58.69 Use of high risk medications            Z79.899    Other long term (current) drug therapy              Orders:          66645   Drug test prsmv qual dir optical obs per day  (In-House)           300.02 Anxiety            F41.8    Other specified anxiety disorders    401.1 Hypertension            I10    Essential (primary) hypertension    780.4 Dizziness            R42    Dizziness and giddiness

## 2021-05-18 NOTE — PROGRESS NOTES
Violetta Starr  1940     Office/Outpatient Visit    Visit Date: Fri, Aug 14, 2020 10:26 am    Provider: Antoni Gonzalez MD (Assistant: Kadi Reddy RN)    Location: [facility]        Electronically signed by Antoni Gonzalez MD on  08/14/2020 01:26:16 PM                             Subjective:        CC: physical exam, blood pressure, anxiety    HPI:           Ms. Starr is here for a Medicare wellness visit.  The required HRA questions are integrated within this visit note. Family medical history and individual medical/surgical history were reviewed and updated.  A current height, weight, BMI, blood pressure, and pulse were recorded in the vitals section of the note and have been reviewed. Patient's medications, including supplements, were recorded in the chart and reviewed.  Current providers and suppliers were reviewed and updated.          Self-Assessment of Health: She rates her health as excellent. She rates her confidence of being able to control/manage most of her health problems as very confident. Her physical/emotional health has limited her social activites moderately.  A review of cognitive impairment was performed, including ability to drive a car, manage finances, and any memory changes, and was found to be negative.  A review of functional ability, including bathing, dressing, walking, and urine/bowel continence as well as level of safety was performed and was found to be negative.  Falls Risk: Has fallen 2 or more times or had one fall with injury in the past year.  In regard to hearing, she reports having trouble hearing the TV/radio when others do not and having to strain to hear or understand conversations, but not wearing hearing aid(s).  Concerning home safety, she reports that at home she DOES have adequate lighting, a skid resistant shower/tub, grab bars in the bath, handrails on stairs and functioning smoke alarms, but not absence of throw rugs.           Immunization Status: ** >10 years since last Td booster; Age>60, no shingles vaccination; Physical Activity: She never excercises.; Type of diet patient normally eats is described as well-balanced with fruits and vegetables Tobacco: She has never smoked.            Violetta also has anxiety for which she takes Ativan on a sparing basis.  She has not had any sign of addiction or abuse of that medication.  She would like to get it refilled.  It is helpful for her.  No concerning side effects, and risks are reviewed.  Christiano is consistent today.            Additionally, she presents with history of essential (primary) hypertension.  her current cardiac medication regimen includes a beta-blocker ( Tenormin ), a calcium channel blocker ( Norvasc ), and an angiotensin receptor blocker ( Cozaar ).  Review of her blood pressure log reveals systolics in the 130s to 140s.  She is tolerating the medication well without side effects.  Compliance with treatment has been good; she takes her medication as directed and follows up as directed.            Violetta also has history of significant arthritis for which she has been getting injections every few months by Dr. Iyer.  She indicates that he has expressed concerns about knee replacement and would like to consider a second opinion.  She has been struggling more with pain and limitation of function because of this.    ROS:     CONSTITUTIONAL:  Negative for chills and fever.      CARDIOVASCULAR:  Negative for chest pain and palpitations.      RESPIRATORY:  Negative for recent cough and dyspnea.      GASTROINTESTINAL:  Negative for abdominal pain, nausea and vomiting.      INTEGUMENTARY/BREAST:  Negative for atypical mole(s) and rash.          Past Medical History / Family History / Social History:         Last Reviewed on 8/14/2020 10:44 AM by Antoni Gonzalez    Past Medical History:                 PAST MEDICAL HISTORY         Hypertension     Gastroesophageal Reflux Disease      Endometrial cancer: S/P surgical resection; vaginal brachytherapy radiation complete 8/5/16;     Anxiety         CURRENT MEDICAL PROVIDERS:    Obstetrician/Gynecologist: Dr. Bernal    Oncologist: [facility]    Ophthalmologist: [facility]    Orthopedist: [facility]             ADVANCE DIRECTIVES: Living will  (Sam) or daughter (Pam Duke)         PREVENTIVE HEALTH MAINTENANCE             BONE DENSITY: was last done 04/2016 with normal results     COLORECTAL CANCER SCREENING: Up to date (colonoscopy q10y; sigmoidoscopy q5y; Cologuard q3y) was last done 03/2014, Results are in chart; colonoscopy with normal results     EYE EXAM: was last done 4/21/2015 (Dr. 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          Alcohol:  Does not drink alcohol and never has.          Substance Abuse History:     Last Reviewed on 8/14/2020 10:44 AM by Antoni Gonzalez    NEGATIVE         Mental Health History:     Last Reviewed on 8/14/2020 10:44 AM by Antoni Gonzalez        Communicable Diseases (eg STDs):     Last Reviewed on 8/14/2020 10:44 AM by Antoni Gonzalez        Current Problems:     Last Reviewed on 8/14/2020 10:44 AM by Antoni Gonzalez    Other specified anxiety disorders    Essential (primary) hypertension    Gastro-esophageal reflux disease without esophagitis    Other long term (current) drug therapy     Benign essential microscopic hematuria    Other microscopic hematuria    Malignant neoplasm of endometrium    Mixed hyperlipidemia    Dizziness and giddiness        Immunizations:     Influenza, high dose seasonal 1/7/2020    zzPrevnar-13 3/13/2015    Pneomovax 2/11/2014    Influenza Virus Vaccine, unspecified formulation 1/1/2018        Allergies:     Last Reviewed on 8/14/2020 10:44 AM by Antoni Gonzalez    Amlodipine/Benazepril: cough     Losartan/Hydrochlorothiazide: cough     Lisinopril/Hydrochlorothiazide: cough     Brynn: cough         Current Medications:     Last Reviewed on 8/14/2020 10:44 AM by Antoni Gonzalez    Calcium 600mg 1 tab daily     atenoloL 50 mg oral tablet [TAKE ONE TABLET BY MOUTH DAILY]    omeprazole 40 mg oral capsule,delayed release (enteric coated) [TAKE ONE CAPSULE BY MOUTH DAILY]    famotidine 20 mg oral tablet [TAKE ONE TABLET BY MOUTH TWICE A DAY]    LORazepam 0.5 mg oral tablet [TAKE ONE TABLET BY MOUTH DAILY AS NEEDED]    Cozaar 100 mg oral tablet [TAKE ONE TABLET BY MOUTH DAILY]    traZODone 50 mg oral tablet [Take 1/2 to 1 tablet(s) by mouth at bedtime]    Meloxicam 15 mg oral tablet [1 tab daily]    Diphenhydramine HCl 25 mg oral tablet    magnesium 250mg daily     potassium 99mg daily     Losartan 100 mg oral tablet [Take 1/2 tablet(s) by mouth daily]    amLODIPine 2.5 mg oral tablet [TAKE ONE TABLET BY MOUTH DAILY]    naproxen 500 mg oral tablet [one tablet BID prn]        Objective:        Vitals:         Current: 8/14/2020 10:33:35 AM    Ht:  5 ft, 4.5 in;  Wt: 181.8 lbs;  BMI: 30.7T: 97.1 F (temporal);  BP: 171/88 mm Hg (right arm, sitting);  P: 66 bpm (right arm (BP Cuff), sitting);  sCr: 0.91 mg/dL;  GFR: 53.13        Repeat:     10:35:2 AM  BP:   155/89mm Hg (left arm, sitting, pulse-66) 11:15:27 AM  BP:   143/77mm Hg (left arm, sitting, pulse-58)     Exams:     PHYSICAL EXAM:     GENERAL: vital signs recorded - well developed, well nourished;  no apparent  distress;     EYES: extraocular movements intact; conjunctiva and cornea are normal; PERRL; binocular vision is 20/25 - hearing is normal    E/N/T:  normal EACs, TMs, nasal/oral mucosa, teeth, gingiva, and oropharynx;     NECK: range of motion is normal; thyroid is non-palpable;     RESPIRATORY: normal respiratory rate and pattern with no distress; normal breath sounds with no rales, rhonchi, wheezes or rubs;     CARDIOVASCULAR: normal rate; rhythm is regular;  no systolic murmur; no edema;     GASTROINTESTINAL: nontender; normal bowel sounds; no organomegaly;     LYMPHATIC: no enlargement of cervical or facial nodes; no supraclavicular nodes;     BREAST/INTEGUMENT: SKIN: no significant rashes or lesions; no suspicious moles;     NEUROLOGIC: mental status: alert and oriented x 3; cranial nerves II-XII grossly intact;     PSYCHIATRIC: appropriate affect and demeanor; normal psychomotor function;         Assessment:         Z00.01   Encounter for general adult medical examination with abnormal findings       F41.8   Other specified anxiety disorders       I10   Essential (primary) hypertension       M17.0   Bilateral primary osteoarthritis of knee           ORDERS:         Meds Prescribed:       [Refilled] meloxicam 15 mg oral tablet [take 1 tablet (15 mg) by oral route once daily], #90 (ninety) tablets, Refills: 1 (one)       [Refilled] traZODone 50 mg oral tablet [Take 1/2 to 1 tablet(s) by mouth at bedtime], #90 (ninety) tablets, Refills: 3 (three)       [Refilled] atenoloL 50 mg oral tablet [TAKE ONE TABLET BY MOUTH DAILY], #90 (ninety) tablets, Refills: 1 (one)       [Refilled] Cozaar 100 mg oral tablet [TAKE ONE TABLET BY MOUTH DAILY], #90 (ninety) tablets, Refills: 1 (one)       [Refilled] omeprazole 40 mg oral capsule,delayed release (enteric coated) [TAKE ONE CAPSULE BY MOUTH DAILY], #90 (ninety) capsules, Refills: 1 (one)         Radiology/Test Orders:       3017F  Colorectal CA screen results documented and  reviewed (PV)  (In-House)              Lab Orders:       [id number]  COMP - [facility] Comp. Metabolic Panel  (Send-Out)            71450  BDCB2 - [facility] CBC w/o diff  (Send-Out)              Procedures Ordered:       REFER  Referral to Specialist or Other Facility  (Send-Out)              Other Orders:         Depression screen negative  (In-House)            1100F  Pt screen for fall risk; document 2+ falls in the past yr or any fall w/injury in past year ([facility])  (In-House)            1123F  Advance Care Planning discussed and doc; advance care plan or surrogate decision maker doc. in MR  (Send-Out)              Subsequent Annual Well Visit Medicare  (x1)                  Plan:         Encounter for general adult medical examination with abnormal findings        RECOMMENDATIONS given include: Today, we have reviewed Violetta's care.  See attached wellness recommendations.  I'm going to refill her medications as noted below.  Her blood pressure remains a bit high.  We will repeat it again and see where it is.  Otherwise, labs to be updated as noted.  Referral to ortho regarding knee.  No other changes..  MIPS PHQ-9 Depression Screening: Completed form scanned and in chart; Total Score 2; Negative Depression Screen           Orders:         Depression screen negative  (In-House)            1100F  Pt screen for fall risk; document 2+ falls in the past yr or any fall w/injury in past year ([facility])  (In-House)            3017F  Colorectal CA screen results documented and reviewed (PV)  (In-House)            1123F  Advance Care Planning discussed and doc; advance care plan or surrogate decision maker doc. in MR  (Send-Out)              Other specified anxiety disorders          Prescriptions:       [Refilled] meloxicam 15 mg oral tablet [take 1 tablet (15 mg) by oral route once daily], #90 (ninety) tablets, Refills: 1 (one)       [Refilled] traZODone 50 mg oral tablet [Take 1/2 to 1 tablet(s) by mouth at bedtime], #90 (ninety)  tablets, Refills: 3 (three)       [Refilled] atenoloL 50 mg oral tablet [TAKE ONE TABLET BY MOUTH DAILY], #90 (ninety) tablets, Refills: 1 (one)       [Refilled] Cozaar 100 mg oral tablet [TAKE ONE TABLET BY MOUTH DAILY], #90 (ninety) tablets, Refills: 1 (one)       [Refilled] omeprazole 40 mg oral capsule,delayed release (enteric coated) [TAKE ONE CAPSULE BY MOUTH DAILY], #90 (ninety) capsules, Refills: 1 (one)         Essential (primary) hypertensionAs above.    LABORATORY:  Labs ordered to be performed today include Comprehensive metabolic panel.            Orders:       [id number]  [facility] Comp. Metabolic Panel  (Send-Out)              Bilateral primary osteoarthritis of knee    LABORATORY:  Labs ordered to be performed today include CBC W/O DIFF.      REFERRALS:  Referral initiated to an orthopedist ( Dr. Charan Payne ).            Orders:       REFER  Referral to Specialist or Other Facility  (Send-Out)            [address]  [address] CBC w/o diff  (Send-Out)                  Charge Capture:         Primary Diagnosis:     Z00.01  Encounter for general adult medical examination with abnormal findings           Orders:      [id number]  Preventive medicine, established patient, age 65+ years  (In-House)              Subsequent Annual Well Visit Medicare  (x1)          Depression screen negative  (In-House)            1100F  Pt screen for fall risk; document 2+ falls in the past yr or any fall w/injury in past year ([facility])  (In-House)            3017F  Colorectal CA screen results documented and reviewed (PV)  (In-House)              F41.8  Other specified anxiety disorders           Orders:      [id number]  Office/outpatient visit; established patient, level 4  (In-House)              I10  Essential (primary) hypertension     M17.0  Bilateral primary osteoarthritis of knee

## 2021-05-18 NOTE — PROGRESS NOTES
Violetta Starr  1940     Office/Outpatient Visit    Visit Date: Mon, Mar 15, 2021 11:20 am    Provider: Antoni Gonzalez MD (Assistant: Kadi Reddy RN)    Location: [facility]        Electronically signed by Antoni Gonzalez MD on  03/17/2021 09:17:02 AM                             Subjective:        CC: blood pressure, GERD    HPI:           Patient presents with essential (primary) hypertension.  Her current cardiac medication regimen includes a beta-blocker ( Tenormin ), a calcium channel blocker ( Norvasc ), and an angiotensin receptor blocker ( Cozaar ).  Review of her blood pressure log reveals systolics in the 150s.  She is tolerating the medication well without side effects.  Compliance with treatment has been good; she takes her medication as directed and follows up as directed.            She also has GERD for which she remains on omeprazole.  She tolerates this well and is without acute complaint.          Violetta also has anxiety for which she takes Ativan on a sparing basis.  She has not had any sign of addiction or abuse of that medication.  She would like to get it refilled.  It is helpful for her.  No concerning side effects, and risks are reviewed.  Christiano is consistent today.      ROS:     CONSTITUTIONAL:  Negative for chills and fever.      CARDIOVASCULAR:  Negative for chest pain and palpitations.      RESPIRATORY:  Negative for recent cough and dyspnea.      GASTROINTESTINAL:  Negative for abdominal pain, nausea and vomiting.      INTEGUMENTARY/BREAST:  Negative for atypical mole(s) and rash.          Past Medical History / Family History / Social History:         Last Reviewed on 3/15/2021 11:40 AM by Antoni Gonzalez    Past Medical History:                 PAST MEDICAL HISTORY         Hypertension     Gastroesophageal Reflux Disease     Endometrial cancer: S/P surgical resection; vaginal brachytherapy radiation complete 8/5/16;     Anxiety          CURRENT MEDICAL PROVIDERS:    Obstetrician/Gynecologist: Dr. Bernal    Oncologist: [facility]    Ophthalmologist: [facility] ([facility])    Orthopedist: Jaylon             ADVANCE DIRECTIVES: Living will  (Sam) or daughter (Pam Duke)         PREVENTIVE HEALTH MAINTENANCE             BONE DENSITY: was last done 8/17/20 with normal results     COLORECTAL CANCER SCREENING: Up to date (colonoscopy q10y; sigmoidoscopy q5y; Cologuard q3y) was last done 03/2014, Results are in chart; colonoscopy with normal results     EYE EXAM: was last done 4/21/2015 (Dr. 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          Alcohol:  Does not drink alcohol and never has.          Substance Abuse History:     Last Reviewed on 3/15/2021 11:40 AM by Antoni Gonzalez    NEGATIVE         Mental Health History:     Last Reviewed on 3/15/2021 11:40 AM by Antoni Gonzalez        Communicable Diseases (eg STDs):     Last Reviewed on 3/15/2021 11:40 AM by Antoni Gonzalez        Current Problems:     Last Reviewed on 3/15/2021 11:40 AM by Antoni Gonzalez    Other specified anxiety disorders    Essential (primary) hypertension    Gastro-esophageal reflux disease without esophagitis    Other long term (current) drug therapy    Benign essential microscopic hematuria    Other microscopic hematuria     Malignant neoplasm of endometrium    Mixed hyperlipidemia    Dizziness and giddiness    Encounter for general adult medical examination with abnormal findings    Encounter for other screening for malignant neoplasm of breast    Asymptomatic menopausal state    Bilateral primary osteoarthritis of knee    Chronic kidney disease, stage 3 (moderate)        Immunizations:     Influenza, high dose seasonal 1/7/2020    zzPrevnar-13 3/13/2015    Pneomovax 2/11/2014    Influenza Virus Vaccine, unspecified formulation 1/1/2018    Influenza, high dose seasonal 11/1/2020        Allergies:     Last Reviewed on 3/15/2021 11:40 AM by Antoni Gonzalez    Amlodipine/Benazepril: cough     Losartan/Hydrochlorothiazide: cough     Lisinopril/Hydrochlorothiazide: cough     Brynn: cough         Current Medications:     Last Reviewed on 3/15/2021 11:40 AM by Antoni Gonzalez    Calcium 600mg 1 tab daily     atenoloL 50 mg oral tablet [TAKE ONE TABLET BY MOUTH DAILY]    omeprazole 40 mg oral capsule,delayed release (enteric coated) [TAKE ONE CAPSULE BY MOUTH DAILY]    LORazepam 0.5 mg oral tablet [TAKE ONE TABLET BY MOUTH DAILY AS NEEDED]    famotidine 20 mg oral tablet [TAKE ONE TABLET BY MOUTH TWICE A DAY]    Cozaar 100 mg oral tablet [TAKE ONE TABLET BY MOUTH DAILY]    traZODone 50 mg oral tablet [Take 1/2 to 1 tablet(s) by mouth at bedtime]    meloxicam 15 mg oral tablet [take 1 tablet (15 mg) by oral route once daily]    Diphenhydramine HCl 25 mg oral tablet    magnesium 250mg daily     potassium 99mg daily     naproxen 500 mg oral tablet [one tablet BID prn]    amLODIPine 5 mg oral tablet [TAKE ONE TABLET BY MOUTH DAILY]        Objective:        Vitals:         Current: 3/15/2021 11:24:42 AM    Ht:  5 ft, 4.5 in;  Wt: 171.8 lbs;  BMI: 29.0T: 96.1 F (temporal);  BP: 151/65 mm Hg (right arm, sitting);  P: 63 bpm (right arm (BP Cuff), sitting);  sCr: 1.22 mg/dL;  GFR: 38.06        Repeat:     11:59:50 AM  BP:   123/60mm Hg  (right arm, sitting, p-59)     Exams:     PHYSICAL EXAM:     GENERAL: vital signs recorded - well developed, well nourished;  no apparent distress;     EYES: extraocular movements intact; conjunctiva and cornea are normal; PERRL;     NECK: range of motion is normal; thyroid is non-palpable;     RESPIRATORY: normal respiratory rate and pattern with no distress; normal breath sounds with no rales, rhonchi, wheezes or rubs;     CARDIOVASCULAR: normal rate; rhythm is regular;  no systolic murmur; no edema;     GASTROINTESTINAL: nontender; normal bowel sounds; no organomegaly;     LYMPHATIC: no enlargement of cervical or facial nodes; no supraclavicular nodes;     BREAST/INTEGUMENT: SKIN: no significant rashes or lesions; no suspicious moles;     NEUROLOGIC: mental status: alert and oriented x 3; cranial nerves II-XII grossly intact;     PSYCHIATRIC: appropriate affect and demeanor; normal psychomotor function;         Lab/Test Results:         Urine temperature: Unable to confirm (03/15/2021),     All urine drug screen levels confirmed negative: yes (03/15/2021),     Date and time of last pill: lorazepam-3/15/21 @ 530am/dominique (03/15/2021),     Performed by: vance (03/15/2021),     Collection Time: 1202 (03/15/2021),             Assessment:         I10   Essential (primary) hypertension       K21.9   Gastro-esophageal reflux disease without esophagitis       F41.8   Other specified anxiety disorders           ORDERS:         Meds Prescribed:       [Refilled] LORazepam 0.5 mg oral tablet [TAKE ONE TABLET BY MOUTH DAILY AS NEEDED], #30 (thirty) tablets, Refills: 0 (zero)       [Refilled] meloxicam 15 mg oral tablet [take 1 tablet (15 mg) by oral route once daily], #90 (ninety) tablets, Refills: 1 (one)       [Refilled] Cozaar 100 mg oral tablet [TAKE ONE TABLET BY MOUTH DAILY], #90 (ninety) tablets, Refills: 1 (one)       [Refilled] atenoloL 50 mg oral tablet [TAKE ONE TABLET BY MOUTH DAILY], #90 (ninety) tablets, Refills: 1  (one)       [Refilled] omeprazole 40 mg oral capsule,delayed release (enteric coated) [TAKE ONE CAPSULE BY MOUTH DAILY], #90 (ninety) capsules, Refills: 1 (one)       [Refilled] amLODIPine 5 mg oral tablet [TAKE ONE TABLET BY MOUTH DAILY], #90 (ninety) tablets, Refills: 1 (one)         Lab Orders:       [id number]  COMP - [facility] Comp. Metabolic Panel  (Send-Out)            [id number]  [facility] Urinalysis, automated, with micro  (Send-Out)            04080  Drug test prsmv qual dir optical obs per day  (In-House)                      Plan:         Essential (primary) hypertension    LABORATORY:  Labs ordered to be performed today include Comprehensive metabolic panel and urinalysis with micro.      RECOMMENDATIONS given include: Today, we have reviewed Violetta's care.  She seems well.  However, her blood pressure has been trending higher.  I'm going to repeat her pressure today and update labs.  Consider additional treatment for this, but we will do so with caution.  Also, she uses this lorazepam very sparingly, and it will be refilled.  Risks are again reviewed with her..            Prescriptions:       [Refilled] LORazepam 0.5 mg oral tablet [TAKE ONE TABLET BY MOUTH DAILY AS NEEDED], #30 (thirty) tablets, Refills: 0 (zero)       [Refilled] meloxicam 15 mg oral tablet [take 1 tablet (15 mg) by oral route once daily], #90 (ninety) tablets, Refills: 1 (one)       [Refilled] Cozaar 100 mg oral tablet [TAKE ONE TABLET BY MOUTH DAILY], #90 (ninety) tablets, Refills: 1 (one)       [Refilled] atenoloL 50 mg oral tablet [TAKE ONE TABLET BY MOUTH DAILY], #90 (ninety) tablets, Refills: 1 (one)       [Refilled] omeprazole 40 mg oral capsule,delayed release (enteric coated) [TAKE ONE CAPSULE BY MOUTH DAILY], #90 (ninety) capsules, Refills: 1 (one)       [Refilled] amLODIPine 5 mg oral tablet [TAKE ONE TABLET BY MOUTH DAILY], #90 (ninety) tablets, Refills: 1 (one)           Orders:       [id number]  COMP - [facility] Comp. Metabolic Panel  (Send-Out)             92063  [facility] Urinalysis, automated, with micro  (Send-Out)              Gastro-esophageal reflux disease without esophagitisAs above.        Other specified anxiety disordersAs above.    LABORATORY:  Labs ordered to be performed today include Drug screen.            Orders:       [id number]  Drug test prsmv qual dir optical obs per day  (In-House)                  Charge Capture:         Primary Diagnosis:     I10  Essential (primary) hypertension           Orders:      [id number]  Office/outpatient visit; established patient, level 4  (In-House)              K21.9  Gastro-esophageal reflux disease without esophagitis     F41.8  Other specified anxiety disorders           Orders:      [id number]  Drug test prsmv qual dir optical obs per day  (In-House)

## 2021-05-18 NOTE — PROGRESS NOTES
Violetta Starr 1940     Office/Outpatient Visit    Visit Date: Thu, Apr 19, 2018 12:53 pm    Provider: Antoni Gonzalez MD (Assistant: Heather Russ MA)    Location: [facility]        Electronically signed by Antoni Gonzalez MD on  04/19/2018 06:28:55 PM                             SUBJECTIVE:        CC: physical exam, blood pressure, GERD, anxiety         HPI:         Ms. Starr is here for a Medicare wellness visit.  Individual and family medical history was reviewed and updated A list of current providers and suppliers reviewed and updated A current height, weight, BMI, blood pressure, and pulse were recorded in the vitals section of the note and have been reviewed A review of cognitive impairment was performed and was negative.  A review of functional ability and level of safety was performed and was negative In regard to hearing, she reports having trouble hearing the TV/radio when others do not and having to strain to hear or understand conversations, but not wearing hearing aid(s).  Concerning home safety, she reports that at home she DOES have throw rugs, grab bars in the bath, handrails on stairs and functioning smoke alarms, but not poor lighting or a slippery bath or shower.      Immunization Status: Up to date;     Physical Activity: ** Exercises infrequently; Has not had any falls or only one fall without injury in the past year.  Advance Care Directive updated today in [facility]         Violetta also has GERD for which she is on Pepcid and Prilosec.  She says that her symptoms are fairly well controlled.  She denies dysphagia.  She does well as long as she takes the medications and is cautious about her diet.          Violetta also has anxiety issues for which she takes an occasional Ativan.  She has used that for a many years and uses it on a very limited basis.  She has not had any sign of addiction or abuse of that medication.  She would like to get it refilled.  It is helpful for her.  No  concerning side effects, and risks are reviewed.  Christiano is consistent today.          Dx with hypertension; her current cardiac medication regimen includes a beta-blocker ( Tenormin ) and an angiotensin receptor blocker ( Cozaar ).  She is tolerating the medication well without side effects.  Compliance with treatment has been good; she takes her medication as directed and follows up as directed.  It is note that her heart rate is mildly elevated today.  She has been out of her atenolol, and we discussed the need to avoid this in the future.     ROS:     CONSTITUTIONAL:  Negative for chills and fever.      CARDIOVASCULAR:  Negative for chest pain and palpitations.      RESPIRATORY:  Negative for recent cough and dyspnea.      GASTROINTESTINAL:  Negative for abdominal pain, nausea and vomiting.          PMH/FMH/SH:     Last Reviewed on 4/19/2018 01:12 PM by Antoni Gonzalez    Past Medical History:                 PAST MEDICAL HISTORY         Hypertension     Gastroesophageal Reflux Disease     Endometrial cancer: S/P surgical resection;     Anxiety         CURRENT MEDICAL PROVIDERS:    Obstetrician/Gynecologist: Dr. Bernal             ADVANCE DIRECTIVES: Living will  (Sam) or daughter (Pam Duke)         PREVENTIVE HEALTH MAINTENANCE             COLORECTAL CANCER SCREENING: Up to date (colonoscopy q10y; sigmoidoscopy q5y; Cologuard q3y) was last done 03/2014, Results are in chart; colonoscopy with normal results     MAMMOGRAM: Done within last 2 years and results in are chart was last done 05/2017 with normal results         Surgical History:         Bilateral Tubal Ligation    Bladder Repair;         Family History:         Positive for Breast Cancer ( mother ).      Positive for Cerebrovascular Accident ( father ).          Social History:     Occupation:    Retired     Marital Status:      Children: 3 children         Tobacco/Alcohol/Supplements:     Last Reviewed on 4/19/2018 01:12  PM by Antoni Gonzalez    Tobacco: She has never smoked.          Alcohol:  Does not drink alcohol and never has.          Substance Abuse History:     Last Reviewed on 4/19/2018 01:12 PM by Antoni Gonzalez    NEGATIVE         Mental Health History:     Last Reviewed on 4/19/2018 01:12 PM by Antoni Gonzalez        Communicable Diseases (eg STDs):     Last Reviewed on 4/19/2018 01:12 PM by Antoni Gonzalez            Current Problems:     Last Reviewed on 4/19/2018 01:12 PM by Antoni Gonzalez    Uterine cancer, of corpus uteri     Microscopic hematuria     Insect bite     Screening for lipoid disorders     Use of high risk medications     Anxiety     GERD     Hypertension         Immunizations:     zzPrevnar-13 3/13/2015     Pneomovax 2/11/2014     Influenza Virus Vaccine, unspecified formulation 1/1/2018         Allergies:     Last Reviewed on 4/19/2018 01:12 PM by Antoni Gonzalez    Amlodipine/Benazepril: cough    Losartan/Hydrochlorothiazide: cough    Lisinopril/Hydrochlorothiazide: cough    Brynn: cough        Current Medications:     Last Reviewed on 4/19/2018 01:12 PM by Antoni Gonzalez    Atenolol 50mg Tablet Take 1 tablet(s) by mouth daily     Cozaar 100mg Tablet Take 1 tablet(s) by mouth daily     Famotidine 20mg Tablet Take 1 tablet(s) by mouth bid     Lorazepam 0.5mg Tablet Take one tablet daily PRN     Omeprazole 40mg Capsules, Extended Release Take 1 capsule(s) by mouth daily     Trazodone HCl 50mg Tablet Take 1/2 to 1 tablet(s) by mouth at bedtime     Calcium 600mg 1 tab daily     Diphenhydramine HCl 25mg Tablet     magnesium 250mg daily     Meloxicam 15mg Tablet 1 tab daily     potassium 99mg daily         OBJECTIVE:        Vitals:         Current: 4/19/2018 12:56:27 PM    Ht:  5 ft, 4.5 in;  Wt: 184.5 lbs;  BMI: 31.2    T: 96.5 F (oral);  BP: 145/88 mm Hg (left arm, sitting);  P: 137 bpm (left arm (BP Cuff), sitting);  sCr: 0.84 mg/dL;  GFR: 59.80         Repeat:     1:01:40 PM     P:   117bpm (finger clip, sitting)         Exams:     PHYSICAL EXAM:     GENERAL: vital signs recorded - well developed, well nourished;  no apparent distress;     EYES: extraocular movements intact; conjunctiva and cornea are normal; PERRLA; limited screening of vision and hearing is intact     E/N/T:  normal EACs, TMs, nasal/oral mucosa, teeth, gingiva, and oropharynx;     NECK: range of motion is normal; thyroid is non-palpable;     RESPIRATORY: normal respiratory rate and pattern with no distress; normal breath sounds with no rales, rhonchi, wheezes or rubs;     CARDIOVASCULAR: mildly tachycardic;  rhythm is regular;  no systolic murmur; no edema;     GASTROINTESTINAL: nontender; normal bowel sounds; no organomegaly;     LYMPHATIC: no enlargement of cervical or facial nodes; no supraclavicular nodes;     BREAST/INTEGUMENT: SKIN: no significant rashes or lesions; no suspicious moles;     NEUROLOGICAL:  cranial nerves, motor and sensory function, reflexes, gait and coordination are all intact;     PSYCHIATRIC:  appropriate affect and demeanor; normal speech pattern; grossly normal memory;         ASSESSMENT           V70.0   Z00.01  Yearly physical exam              DDx:     530.81   K21.9  GERD              DDx:     V58.69   Z79.899  Use of high risk medications              DDx:     300.02   F41.8  Anxiety              DDx:     401.1   I10  Hypertension              DDx:     272.2   E78.2  Mixed hyperlipidemia              DDx:         ORDERS:         Meds Prescribed:       Refill of: Atenolol 50mg Tablet Take 1 tablet(s) by mouth daily  #90 (Ninety) tablet(s) Refills: 3       Refill of: Cozaar (Losartan) 100mg Tablet Take 1 tablet(s) by mouth daily  #90 (Ninety) tablet(s) Refills: 3       Refill of: Famotidine 20mg Tablet Take 1 tablet(s) by mouth bid  #180 (One [place] and Eighty) tablet(s) Refills: 3       Refill of: Lorazepam 0.5mg Tablet Take one tablet daily PRN  #30 (Thirty)  tablet(s) Refills: 2       Refill of: Omeprazole 40mg Capsules, Extended Release Take 1 capsule(s) by mouth daily  #90 (Ninety) capsule(s) Refills: 3       Refill of: Trazodone HCl 50mg Tablet Take 1/2 to 1 tablet(s) by mouth at bedtime  #90 (Ninety) tablet(s) Refills: 3         Radiology/Test Orders:       3014F  Screening mammography results documented and reviewed (PV)1  (In-House)         3017F  Colorectal CA screen results documented and reviewed (PV)  (In-House)           Lab Orders:       [id number]  HTNLP - [facility] CMP AND LIPID: [id number]  (Send-Out)  (PLEASE SCHEDULE FASTING LABS)       01347  TSH - [facility] TSH  (Send-Out)           Other Orders:         Depression screen negative  (In-House)         1101F  Pt screen for fall risk; document no falls in past year or only 1 fall w/o injury in past year (DIAMOND)  (In-House)           Negative EtOH screen  (In-House)           Calculated BMI above the upper parameter and a follow-up plan was documented in the medical record  (In-House)           Subsequent Annual Well Visit Medicare (x1)                 PLAN:          Yearly physical exam         RECOMMENDATIONS given include: Violetta seems well today.  I'm going to refill her medications as noted including lorazepam.  Her use of it is limited.  No other near term changes are anticipated.  I think the elevated heart rate is reflecting not having the atenolol for three days.  I suggested she might take 1/2 tablet tonight and then resume the whole tablet tomorrow.  We will otherwise follow from there..  MIPS PHQ-9 Depression Screening: Completed form scanned and in chart; Total Score 4 Negative alcohol screen     MAMMOGRAM: Done within last 2 years and results in are chart     COLORECTAL CANCER SCREENING: Results are in chart     BMI Elevated - Follow-Up Plan: She was provided education on weight loss strategies           Orders:         Depression screen negative  (In-House)         1101F  Pt screen  for fall risk; document no falls in past year or only 1 fall w/o injury in past year (DIAMOND)  (In-House)           Negative EtOH screen  (In-House)         3014F  Screening mammography results documented and reviewed (PV)1  (In-House)         3017F  Colorectal CA screen results documented and reviewed (PV)  (In-House)           Calculated BMI above the upper parameter and a follow-up plan was documented in the medical record  (In-House)             Patient Education Handouts:       Physical Exam 60+ year, Female           GERD As above.           Prescriptions:       Refill of: Famotidine 20mg Tablet Take 1 tablet(s) by mouth bid  #180 (One [place] and Eighty) tablet(s) Refills: 3       Refill of: Omeprazole 40mg Capsules, Extended Release Take 1 capsule(s) by mouth daily  #90 (Ninety) capsule(s) Refills: 3          Use of high risk medications As above.          Anxiety As above.           Prescriptions:       Refill of: Lorazepam 0.5mg Tablet Take one tablet daily PRN  #30 (Thirty) tablet(s) Refills: 2       Refill of: Trazodone HCl 50mg Tablet Take 1/2 to 1 tablet(s) by mouth at bedtime  #90 (Ninety) tablet(s) Refills: 3          Hypertension As above.           Prescriptions:       Refill of: Atenolol 50mg Tablet Take 1 tablet(s) by mouth daily  #90 (Ninety) tablet(s) Refills: 3       Refill of: Cozaar (Losartan) 100mg Tablet Take 1 tablet(s) by mouth daily  #90 (Ninety) tablet(s) Refills: 3          Mixed hyperlipidemia     LABORATORY:  Labs ordered to be performed today include HTN/Lipid Panel: CMP, Lipid and TSH.            Orders:       [id number]  HTNLP - [facility] CMP AND LIPID: [id number], [id number]  (Send-Out)  (PLEASE SCHEDULE FASTING LABS)       96350  TSH - [facility] TSH  (Send-Out)               CHARGE CAPTURE           **Please note: ICD descriptions below are intended for billing purposes only and may not represent clinical diagnoses**        Primary Diagnosis:         V70.0 Yearly physical exam            Z00.01     Encounter for general adult medical examination with abnormal findings              Orders:          03276   Preventive medicine, established patient, age 65+ years  (In-House)                                           Subsequent Annual Well Visit Medicare (x1)              Depression screen negative  (In-House)             1101F   Pt screen for fall risk; document no falls in past year or only 1 fall w/o injury in past year (DIAMOND)  (In-House)                Negative EtOH screen  (In-House)             3014F   Screening mammography results documented and reviewed (PV)1  (In-House)             3017F   Colorectal CA screen results documented and reviewed (PV)  (In-House)                Calculated BMI above the upper parameter and a follow-up plan was documented in the medical record  (In-House)           530.81 GERD            K21.9    Gastro-esophageal reflux disease without esophagitis              Orders:          68132 -25  Office/outpatient visit; established patient, level 4  (In-House)           V58.69 Use of high risk medications            Z79.899    Other long term (current) drug therapy    300.02 Anxiety            F41.8    Other specified anxiety disorders    401.1 Hypertension            I10    Essential (primary) hypertension    272.2 Mixed hyperlipidemia            E78.2    Mixed hyperlipidemia        ADDENDUMS:      ____________________________________    Addendum: 04/20/2018 08:41 AM - Lyndsay Vargas        please add [id number] admin of patient focused health risk assessment instrument

## 2021-05-18 NOTE — PROGRESS NOTES
Violetta Starr  1940     Office/Outpatient Visit    Visit Date: Mon, Jan 13, 2020 10:02 am    Provider: Antoni Gonzalez MD (Assistant: Flora Wills LPN)    Location: [facility]        Electronically signed by Antoni Gonzalez MD on  01/13/2020 11:29:21 PM                             Subjective:        CC: Ms. Starr is a 79 year old White female.  The patient is accompanied into the exam room by her spouse.  was seen at dr. flores office, he sent pt here, bp there 182/101 hr 61. rechecked 184/102;         HPI:           PHQ-9 Depression Screening: Completed form scanned and in chart; Total Score 3           In regard to the essential (primary) hypertension, her current cardiac medication regimen includes a beta-blocker ( Tenormin ) and an angiotensin receptor blocker ( Cozaar ).  She is tolerating the medication well without side effects.  Compliance with treatment has been good; she takes her medication as directed and follows up as directed.  Violetta was seen by Dr. Iyer this morning for follow up her knees.  She was noted to have a very high pressure there.  Her pressure is also quite elevated here.  She feels okay.    ROS:     CONSTITUTIONAL:  Negative for chills and fever.      CARDIOVASCULAR:  Negative for chest pain and palpitations.      RESPIRATORY:  Negative for recent cough and dyspnea.      GASTROINTESTINAL:  Negative for abdominal pain, nausea and vomiting.      INTEGUMENTARY/BREAST:  Negative for atypical mole(s) and rash.          Past Medical History / Family History / Social History:         Last Reviewed on 1/13/2020 11:17 AM by Antoni Gonzalez    Past Medical History:                 PAST MEDICAL HISTORY         Hypertension     Gastroesophageal Reflux Disease     Endometrial cancer: S/P surgical resection; vaginal brachytherapy radiation complete 8/5/16;     Anxiety         CURRENT MEDICAL PROVIDERS:    Obstetrician/Gynecologist: Dr. Bernal    Oncologist:  [facility]    Ophthalmologist: [facility] (mekhi)    Orthopedist: Jaylon             ADVANCE DIRECTIVES: Living will  (Sam) or daughter (Pam Duke)         PREVENTIVE HEALTH MAINTENANCE             BONE DENSITY: was last done 04/2016 with normal results     COLORECTAL CANCER SCREENING: Up to date (colonoscopy q10y; sigmoidoscopy q5y; Cologuard q3y) was last done 03/2014, Results are in chart; colonoscopy with normal results     EYE EXAM: was last done 4/21/2015 (Dr. 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          Alcohol:  Does not drink alcohol and never has.          Substance Abuse History:     Last Reviewed on 1/13/2020 11:17 AM by Antoni Gonzalez    NEGATIVE         Mental Health History:     Last Reviewed on 1/13/2020 11:17 AM by Antoni Gonzalez        Communicable Diseases (eg STDs):     Last Reviewed on 1/13/2020 11:17 AM by Antoni Gonzalez        Current Problems:     Last Reviewed on 1/13/2020 11:17 AM by Antoni Gonzalez    Other specified anxiety disorders    Essential (primary) hypertension    Gastro-esophageal reflux disease without esophagitis    Other long term (current) drug therapy    Benign essential microscopic hematuria    Other microscopic hematuria    Malignant neoplasm of endometrium    Mixed hyperlipidemia    Dizziness and giddiness    Encounter for screening for  depression        Immunizations:     zzPrevnar-13 3/13/2015    Pneomovax 2/11/2014    Influenza Virus Vaccine, unspecified formulation 1/1/2018    Influenza, high dose seasonal 1/7/2020        Allergies:     Last Reviewed on 1/13/2020 11:17 AM by Antoni Gonzalez    Amlodipine/Benazepril: cough     Losartan/Hydrochlorothiazide: cough     Lisinopril/Hydrochlorothiazide: cough     Brynn: cough         Current Medications:     Last Reviewed on 1/13/2020 11:17 AM by Antoni Gonzalez    Calcium 600mg 1 tab daily    Atenolol 50mg Tablet [Take 1 tablet(s) by mouth daily]    Famotidine 20mg Tablet [Take 1 tablet(s) by mouth bid]    Lorazepam 0.5mg Tablet [Take one tablet daily PRN]    Omeprazole 40mg Capsules, Extended Release [Take 1 capsule(s) by mouth daily]    Trazodone HCl 50mg Tablet [Take 1/2 to 1 tablet(s) by mouth at bedtime]    Meloxicam 15mg Tablet [1 tab daily]    Diphenhydramine HCl 25mg Tablet    magnesium 250mg daily    potassium 99mg daily    Losartan 100mg Tablet [Take 1/2 tablet(s) by mouth daily]        Objective:        Vitals: BP checked in AR L arm, 184/104, p 64    BP checked in AR R arm, 190/108, p 66        Current: 1/13/2020 10:08:26 AM    Ht:  5 ft, 4.5 in;  Wt: 181.4 lbs;  BMI: 30.7T: 97.4 F (oral);  BP: 201/84 mm Hg (right arm, sitting);  P: 67 bpm (right arm (BP Cuff), sitting);  sCr: 1.1 mg/dL;  GFR: 43.91        Exams:     PHYSICAL EXAM:     GENERAL: vital signs recorded - well developed, well nourished;  no apparent distress;     NECK: range of motion is normal; thyroid is non-palpable;     RESPIRATORY: normal respiratory rate and pattern with no distress; normal breath sounds with no rales, rhonchi, wheezes or rubs;     CARDIOVASCULAR: normal rate; rhythm is regular;  no systolic murmur; no edema;     GASTROINTESTINAL: nontender; normal bowel sounds; no organomegaly;     LYMPHATIC: no enlargement of cervical or facial nodes; no supraclavicular nodes;     BREAST/INTEGUMENT: SKIN:  no significant rashes or lesions; no suspicious moles;     NEUROLOGIC: no focal weakness is apparent; mental status: alert and oriented x 3; cranial nerves II-XII grossly intact;         Assessment:         I10   Essential (primary) hypertension           ORDERS:         Meds Prescribed:       [New Rx] amLODIPine 2.5 mg oral tablet [take 1 tablet (2.5 mg) by oral route once daily], #30 (thirty) tablets, Refills: 1 (one)         Radiology/Test Orders:       3017F  Colorectal CA screen results documented and reviewed (PV)  (In-House)            2022F  Dilated retinal eye exam w/interpret by ophthalmologist/optometrist documented/reviewed (DM)4  (In-House)              Lab Orders:       [id number]  [facility] Basic Metabolic Panel  (Send-Out)              Other Orders:         Depression screen negative  (In-House)            1100F  Pt screen for fall risk; document 2+ falls in the past yr or any fall w/injury in past year (DIAMOND)  (In-House)            1123F  Advance Care Planning discussed and doc; advance care plan or surrogate decision maker doc. in MR  (Send-Out)                      Plan:         Essential (primary) hypertension    LABORATORY:  Labs ordered to be performed today include basic metabolic panel.      RECOMMENDATIONS given include: Today, we have reviewed Violetta's care.  I'm going to recommend she take an additional 1/2 tablet of losartan when she gets home.  Tonight, she will start amlodipine at bedtime.  Risk of swelling is discussed.  I've advised that she continue her other medications as they are for now.  She should have a repeat blood pressure check on Thursday by the nurse..  MIPS PHQ-9 Depression Screening: Completed form scanned and in chart; Total Score 3; Negative Depression Screen           Prescriptions:       [New Rx] amLODIPine 2.5 mg oral tablet [take 1 tablet (2.5 mg) by oral route once daily], #30 (thirty) tablets, Refills: 1 (one)           Orders:       [id number]  [facility] Basic  Metabolic Panel  (Send-Out)              Depression screen negative  (In-House)            1100F  Pt screen for fall risk; document 2+ falls in the past yr or any fall w/injury in past year (DIAMOND)  (In-House)            3017F  Colorectal CA screen results documented and reviewed (PV)  (In-House)            2022F  Dilated retinal eye exam w/interpret by ophthalmologist/optometrist documented/reviewed (DM)4  (In-House)            1123F  Advance Care Planning discussed and doc; advance care plan or surrogate decision maker doc. in MR  (Send-Out)                Patient Education Handouts:       High Blood Pressure (HTN)              Charge Capture:         Primary Diagnosis:     I10  Essential (primary) hypertension           Orders:      [id number]  Office/outpatient visit; established patient, level 3  (In-House)              Depression screen negative  (In-House)            1100F  Pt screen for fall risk; document 2+ falls in the past yr or any fall w/injury in past year (DIAMOND)  (In-House)            3017F  Colorectal CA screen results documented and reviewed (PV)  (In-House)            2022F  Dilated retinal eye exam w/interpret by ophthalmologist/optometrist documented/reviewed (DM)4  (In-House)

## 2021-06-09 ENCOUNTER — TELEPHONE (OUTPATIENT)
Dept: FAMILY MEDICINE CLINIC | Age: 81
End: 2021-06-09

## 2021-06-09 DIAGNOSIS — I10 ESSENTIAL HYPERTENSION: Primary | ICD-10-CM

## 2021-06-09 RX ORDER — HYDRALAZINE HYDROCHLORIDE 10 MG/1
10 TABLET, FILM COATED ORAL 2 TIMES DAILY PRN
Qty: 30 TABLET | Refills: 1 | Status: SHIPPED | OUTPATIENT
Start: 2021-06-09 | End: 2021-07-08 | Stop reason: SDUPTHER

## 2021-06-09 NOTE — TELEPHONE ENCOUNTER
Caller: Violetta Starr    Relationship: Self    Best call back number: [phone]    What is the best time to reach you: ANYTIME    Who are you requesting to speak with (clinical staff, provider,  specific staff member): CLINICAL      What was the call regarding: THE PATIENT STATED HER BLOOD PRESSURE IS OUT OF CONTROL AGAIN. SHE STATED ABOUT FIVE HOURS AFTER TAKING HER EVENING MEDICATION HER BLOOD PRESSURE WAS /100 ON 06/08/2021. IN THE MORNING ON 06/09/2021, ABOUT AN HOUR AFTER HER MORNING MEDICATION, HER BLOOD PRESSURE /77. SHE WOULD LIKE A CALL BACK TO DISCUSS IF SHE SHOULD SEE A CARDIOLOGIST ASAP.    Do you require a callback: YES

## 2021-06-09 NOTE — TELEPHONE ENCOUNTER
I am concerned about the pressure being so high.  I have gone ahead and sent a medicine called hydralazine.  I would like her to take it twice daily when needed if the top number is over 160.  As with any blood pressure medicine, there is the risk of the blood pressure dropping.  I do not know that we need to refer to cardiology yet.  However, if she would feel more comfortable moving ahead with appointment with them, then make it so.  Thanks.

## 2021-06-09 NOTE — TELEPHONE ENCOUNTER
Spoke with pt , informed her that  was out of office today, but I would call her back with his recommendations.

## 2021-06-21 ENCOUNTER — CLINICAL SUPPORT (OUTPATIENT)
Dept: FAMILY MEDICINE CLINIC | Age: 81
End: 2021-06-21

## 2021-06-21 VITALS — DIASTOLIC BLOOD PRESSURE: 73 MMHG | HEART RATE: 64 BPM | SYSTOLIC BLOOD PRESSURE: 154 MMHG

## 2021-06-21 NOTE — PROGRESS NOTES
Her blood pressure is a little bit better today with the addition of hydralazine.  See how it is running at home for her and let me know.  If she is tolerating that medicine okay, I may want to consider pushing up a little higher on the dose or frequency.  Thanks.

## 2021-06-22 ENCOUNTER — TELEPHONE (OUTPATIENT)
Dept: FAMILY MEDICINE CLINIC | Age: 81
End: 2021-06-22

## 2021-06-22 NOTE — TELEPHONE ENCOUNTER
----- Message from Antoni Gonzalez MD sent at 6/22/2021  6:22 AM EDT -----  See attached note.  Thanks.

## 2021-06-22 NOTE — TELEPHONE ENCOUNTER
If she has adequate quantity of it, I would recommend going ahead and increasing the hydralazine to 10 mg 3 times a day including 1 dose close to bedtime.  If I need to send refill on it, let me know.

## 2021-06-22 NOTE — TELEPHONE ENCOUNTER
Pt inf, states she is tolerating med okay, states her issue with elevated bp is usually during the middle of the night, when she gets up to go to the bathroom she checks it and it is usually elevated, so she takes a hydralazine, yesterday at 4:30am systolic was 177. States she has f/u appt with you on 7/8/21.

## 2021-07-01 VITALS
HEART RATE: 117 BPM | WEIGHT: 184.5 LBS | TEMPERATURE: 96.5 F | HEIGHT: 65 IN | DIASTOLIC BLOOD PRESSURE: 88 MMHG | BODY MASS INDEX: 30.74 KG/M2 | SYSTOLIC BLOOD PRESSURE: 145 MMHG

## 2021-07-01 VITALS
BODY MASS INDEX: 29.19 KG/M2 | TEMPERATURE: 98 F | SYSTOLIC BLOOD PRESSURE: 121 MMHG | DIASTOLIC BLOOD PRESSURE: 65 MMHG | HEART RATE: 72 BPM | WEIGHT: 175.2 LBS | HEIGHT: 65 IN

## 2021-07-02 VITALS
DIASTOLIC BLOOD PRESSURE: 60 MMHG | HEIGHT: 65 IN | TEMPERATURE: 96.1 F | HEART RATE: 63 BPM | WEIGHT: 171.8 LBS | SYSTOLIC BLOOD PRESSURE: 123 MMHG | BODY MASS INDEX: 28.62 KG/M2

## 2021-07-02 VITALS
HEIGHT: 65 IN | WEIGHT: 181.4 LBS | BODY MASS INDEX: 30.22 KG/M2 | TEMPERATURE: 97.4 F | HEART RATE: 67 BPM | SYSTOLIC BLOOD PRESSURE: 201 MMHG | DIASTOLIC BLOOD PRESSURE: 84 MMHG

## 2021-07-02 VITALS
WEIGHT: 181.8 LBS | BODY MASS INDEX: 30.29 KG/M2 | HEART RATE: 66 BPM | DIASTOLIC BLOOD PRESSURE: 77 MMHG | TEMPERATURE: 97.1 F | HEIGHT: 65 IN | SYSTOLIC BLOOD PRESSURE: 143 MMHG

## 2021-07-08 ENCOUNTER — OFFICE VISIT (OUTPATIENT)
Dept: FAMILY MEDICINE CLINIC | Age: 81
End: 2021-07-08

## 2021-07-08 ENCOUNTER — HOSPITAL ENCOUNTER (OUTPATIENT)
Dept: GENERAL RADIOLOGY | Facility: HOSPITAL | Age: 81
Discharge: HOME | End: 2021-07-08
Admitting: FAMILY MEDICINE

## 2021-07-08 VITALS
WEIGHT: 176 LBS | TEMPERATURE: 97.8 F | BODY MASS INDEX: 30.05 KG/M2 | DIASTOLIC BLOOD PRESSURE: 63 MMHG | HEIGHT: 64 IN | HEART RATE: 59 BPM | SYSTOLIC BLOOD PRESSURE: 128 MMHG

## 2021-07-08 DIAGNOSIS — I10 ESSENTIAL HYPERTENSION: Primary | ICD-10-CM

## 2021-07-08 DIAGNOSIS — K21.9 GASTROESOPHAGEAL REFLUX DISEASE WITHOUT ESOPHAGITIS: ICD-10-CM

## 2021-07-08 DIAGNOSIS — M17.11 PRIMARY OSTEOARTHRITIS OF RIGHT KNEE: ICD-10-CM

## 2021-07-08 DIAGNOSIS — M25.561 ACUTE PAIN OF RIGHT KNEE: ICD-10-CM

## 2021-07-08 DIAGNOSIS — M17.12 PRIMARY OSTEOARTHRITIS OF LEFT KNEE: ICD-10-CM

## 2021-07-08 PROCEDURE — 99214 OFFICE O/P EST MOD 30 MIN: CPT | Performed by: FAMILY MEDICINE

## 2021-07-08 PROCEDURE — 73562 X-RAY EXAM OF KNEE 3: CPT

## 2021-07-08 RX ORDER — LOSARTAN POTASSIUM 100 MG/1
100 TABLET ORAL DAILY
Qty: 90 TABLET | Refills: 1 | Status: SHIPPED | OUTPATIENT
Start: 2021-07-08 | End: 2022-05-27

## 2021-07-08 RX ORDER — HYDRALAZINE HYDROCHLORIDE 10 MG/1
10 TABLET, FILM COATED ORAL 2 TIMES DAILY PRN
Qty: 30 TABLET | Refills: 1 | Status: SHIPPED | OUTPATIENT
Start: 2021-07-08 | End: 2021-09-13

## 2021-07-08 RX ORDER — MELOXICAM 15 MG/1
15 TABLET ORAL DAILY
Qty: 90 TABLET | Refills: 1 | Status: SHIPPED | OUTPATIENT
Start: 2021-07-08 | End: 2021-11-16

## 2021-07-08 RX ORDER — AMLODIPINE BESYLATE 5 MG/1
5 TABLET ORAL NIGHTLY
Qty: 90 TABLET | Refills: 1 | Status: SHIPPED | OUTPATIENT
Start: 2021-07-08 | End: 2021-09-17

## 2021-07-08 RX ORDER — OMEPRAZOLE 40 MG/1
40 CAPSULE, DELAYED RELEASE ORAL DAILY
Qty: 90 CAPSULE | Refills: 1 | Status: SHIPPED | OUTPATIENT
Start: 2021-07-08 | End: 2022-06-16 | Stop reason: SDUPTHER

## 2021-07-08 RX ORDER — ATENOLOL 50 MG/1
50 TABLET ORAL DAILY
Qty: 90 TABLET | Refills: 1 | Status: SHIPPED | OUTPATIENT
Start: 2021-07-08 | End: 2022-05-27

## 2021-07-08 NOTE — PROGRESS NOTES
Violetta Starr presents to [facility].    Chief Complaint:  I have a lot of questions    Subjective   {Problem List  Visit Diagnosis   Encounters  Notes  Medications  Labs  Result Review Imaging  Media :23}     History of Present Illness:  Violetta is in today for follow-up.  She remains concerned about her blood pressure.  She does have a strong family history of stroke and she wants to be careful that were doing what we can to try to protect her from that.  Currently she takes amlodipine, losartan, and atenolol.  She also has an as needed prescription for hydralazine.  Violetta did have a night recently where her blood pressure was elevated and she took hydralazine.  However, the blood pressure did not come down in a timely manner.  She ended up taking her usual medicines and then took another hydralazine.  Finally, the blood pressure did come down.  She does see some variation in the blood pressure during the day.  It does tend to run higher in the mornings and then more normal to a low normal range in the afternoons.    In addition to the above, Violetta has noted some mild swelling of the right lower extremity.  She has also noted some thickening of the anterior leg just below the knee that concerns her.      Review of Systems:  Review of Systems   Constitutional: Negative for chills and fever.   Respiratory: Negative for cough and shortness of breath.    Cardiovascular: Negative for chest pain and palpitations.   Gastrointestinal: Negative for abdominal pain, nausea and vomiting.        Objective   Medical History:  Past Medical History:   • Anxiety   • Endometrial cancer (CMS/HCC)    Status post surgery and radiation   • GERD (gastroesophageal reflux disease)   • Hypertension   • Insomnia     Past Surgical History:   • BLADDER REPAIR   • HYSTERECTOMY   • REPLACEMENT TOTAL KNEE      Family History   Problem Relation Age of Onset   • Breast cancer Mother    • Stroke Father       Social History     Tobacco Use   • Smoking status: Never Smoker   • Smokeless tobacco: Never Used   Substance Use Topics   • Alcohol use: Never       Health Maintenance Due   Topic Date Due   • TDAP/TD VACCINES (1 - Tdap) Never done   • ZOSTER VACCINE (1 of 2) Never done        Immunization History   Administered Date(s) Administered   • COVID-19 (PFIZER) 03/08/2021, 03/29/2021   • Fluzone High Dose =>65 Years ([employer] ONLY) 01/07/2020, 11/11/2020   • Influenza, Unspecified 01/01/2018   • Pneumococcal Polysaccharide (PPSV23) 02/11/2014       Allergies   Allergen Reactions   • Amlodipine Unknown - Low Severity   • Amlodipine-Olmesartan Unknown - Low Severity   • Benazepril Unknown - Low Severity   • Losartan Potassium-Hctz Unknown - Low Severity        Medications:  Current Outpatient Medications on File Prior to Visit   Medication Sig   • LORazepam (ATIVAN) 0.5 MG tablet Take 0.5 mg by mouth Every 8 (Eight) Hours As Needed for Anxiety.   • [DISCONTINUED] amLODIPine (NORVASC) 5 MG tablet Take 5 mg by mouth Daily.   • [DISCONTINUED] atenolol (TENORMIN) 50 MG tablet Take 25 mg by mouth Daily.   • [DISCONTINUED] hydrALAZINE (APRESOLINE) 10 MG tablet Take 1 tablet by mouth 2 (Two) Times a Day As Needed (for blood pressure greater than 160 systolic).   • [DISCONTINUED] meloxicam (MOBIC) 15 MG tablet Take 7.5 mg by mouth Daily.   • [DISCONTINUED] omeprazole (priLOSEC) 40 MG capsule Take 40 mg by mouth Daily.   • [DISCONTINUED] traZODone (DESYREL) 50 MG tablet Take 50 mg by mouth Every Night.   • Fluzone High-Dose Quadrivalent 0.7 ML suspension prefilled syringe ADM 0.7ML IM UTD   • [DISCONTINUED] losartan (COZAAR) 100 MG tablet Take 100 mg by mouth Daily.   • [DISCONTINUED] oxyCODONE-acetaminophen (PERCOCET) 5-325 MG per tablet Take 1 tablet by mouth every 6 hours PRN moderate to severe pain   • [DISCONTINUED] Xarelto 10 MG tablet      No current facility-administered medications on file prior to visit.       Vital  "Signs:   /63 (BP Location: Right arm, Patient Position: Sitting)   Pulse 59   Temp 97.8 °F (36.6 °C) (Oral)   Ht 163.8 cm (64.49\")   Wt 79.8 kg (176 lb)   BMI 29.75 kg/m²       Physical Exam:  Physical Exam  Vitals reviewed.   Constitutional:       General: She is not in acute distress.     Appearance: She is not ill-appearing.   Eyes:      Pupils: Pupils are equal, round, and reactive to light.   Neck:      Comments: No thyromegaly  Cardiovascular:      Rate and Rhythm: Normal rate and regular rhythm.   Pulmonary:      Effort: Pulmonary effort is normal.      Breath sounds: Normal breath sounds.   Abdominal:      General: There is no distension.      Palpations: Abdomen is soft.      Tenderness: There is no abdominal tenderness.   Musculoskeletal:      Cervical back: Neck supple.      Right lower leg: Edema (There is some mild generalized edema) present.      Comments: There is an area of tissue thickening just below the right knee anteriorly.     Lymphadenopathy:      Cervical: No cervical adenopathy.   Skin:     Findings: No lesion or rash.   Neurological:      Mental Status: She is alert.         Result Review      The following data was reviewed by Antoni Gonzalez MD on 07/08/2021.  Lab Results   Component Value Date    WBC 6.20 08/14/2020    HGB 12.50 08/14/2020    HCT 37.2 08/14/2020    MCV 91.9 08/14/2020    .00 08/14/2020     Lab Results   Component Value Date    BUN 23 03/15/2021    CREATININE 1.16 (H) 03/15/2021    BCR 20 03/15/2021    K 4.0 03/15/2021    CO2 26 03/15/2021    CALCIUM 9.8 03/15/2021    ALBUMIN 4.4 03/15/2021    LABIL2 1.6 03/15/2021    AST 20 03/15/2021    ALT 10 03/15/2021     No results found for: CHOL, CHLPL, TRIG, HDL, LDL, LDLDIRECT  No results found for: TSH  No results found for: HGBA1C             Assessment and Plan:   Today, we have again reviewed her care.  With regard to the blood pressure, I am not advising significant changes.  She is concerned " though, and we will make referral to cardiology for second opinion about her care.  I want to be doing the best we can to control her pressure.  For the near term, my advice is to check the blood pressure about 2 hours after taking her morning medicines.  We will leave the hydralazine prescription in place for as needed use.  The area on the right knee is noted.  It could be some kind of phlebitis.  I am going to get an x-ray as a precaution and then we will consider how to move forward.  No other near-term changes anticipated.  Her pressure is well controlled today.       Diagnoses and all orders for this visit:    1. Essential hypertension (Primary)  -     Ambulatory Referral to Cardiology  -     amLODIPine (NORVASC) 5 MG tablet; Take 1 tablet by mouth Every Night.  Dispense: 90 tablet; Refill: 1  -     atenolol (TENORMIN) 50 MG tablet; Take 1 tablet by mouth Daily.  Dispense: 90 tablet; Refill: 1  -     losartan (COZAAR) 100 MG tablet; Take 1 tablet by mouth Daily.  Dispense: 90 tablet; Refill: 1  -     hydrALAZINE (APRESOLINE) 10 MG tablet; Take 1 tablet by mouth 2 (Two) Times a Day As Needed (for blood pressure greater than 160 systolic).  Dispense: 30 tablet; Refill: 1    2. Acute pain of right knee  -     XR Knee 3 View Right; Future    3. Gastroesophageal reflux disease without esophagitis  -     omeprazole (priLOSEC) 40 MG capsule; Take 1 capsule by mouth Daily.  Dispense: 90 capsule; Refill: 1    4. Primary osteoarthritis of right knee  -     meloxicam (MOBIC) 15 MG tablet; Take 1 tablet by mouth Daily.  Dispense: 90 tablet; Refill: 1    5. Primary osteoarthritis of left knee          Follow Up   No follow-ups on file.  Patient was given instructions and counseling regarding her condition or for health maintenance advice. Please see specific information pulled into the AVS if appropriate.

## 2021-08-04 PROBLEM — I25.10 CAD S/P PERCUTANEOUS CORONARY ANGIOPLASTY: Status: ACTIVE | Noted: 2021-08-04

## 2021-08-04 PROBLEM — Z98.61 CAD S/P PERCUTANEOUS CORONARY ANGIOPLASTY: Status: ACTIVE | Noted: 2021-08-04

## 2021-08-20 ENCOUNTER — OFFICE VISIT (OUTPATIENT)
Dept: ORTHOPEDIC SURGERY | Facility: CLINIC | Age: 81
End: 2021-08-20

## 2021-08-20 VITALS — BODY MASS INDEX: 29.02 KG/M2 | TEMPERATURE: 98 F | HEIGHT: 64 IN | WEIGHT: 170 LBS

## 2021-08-20 DIAGNOSIS — R22.9 SOFT TISSUE SWELLING: ICD-10-CM

## 2021-08-20 DIAGNOSIS — M17.11 PRIMARY OSTEOARTHRITIS OF RIGHT KNEE: Primary | ICD-10-CM

## 2021-08-20 DIAGNOSIS — F40.240 CLAUSTROPHOBIA: ICD-10-CM

## 2021-08-20 PROCEDURE — 99214 OFFICE O/P EST MOD 30 MIN: CPT | Performed by: PHYSICIAN ASSISTANT

## 2021-08-20 RX ORDER — DIAZEPAM 5 MG/1
TABLET ORAL
Qty: 2 TABLET | Refills: 0 | Status: SHIPPED | OUTPATIENT
Start: 2021-08-20 | End: 2021-09-21

## 2021-08-20 NOTE — PROGRESS NOTES
"Chief Complaint  Pain of the Right Knee and Establish Care    Subjective    History of Present Illness      Violetta Starr is a 81 y.o. female who presents to [facility] for new complaint of   Knee Pain:    Patient complains of right knee pain.   The pain began 6 months ago.   The pain is located over medial aspect.    She describes the symptoms as aching and grinding.   Symptoms improve with rest.   The symptoms are worse with activity.   The patient can bend and straighten the knee fully.    She also reports swelling in the front of the leg below the knee for 1-2 months. She denies pain in that area.        Objective   Vital Signs:   Temp 98 °F (36.7 °C)   Ht 163.8 cm (64.49\")   Wt 77.1 kg (170 lb)   BMI 28.74 kg/m²     Physical Exam  Vitals signs and nursing note reviewed.   Constitutional:       Appearance: Normal appearance.   Pulmonary:      Effort: Pulmonary effort is normal.   Skin:     General: Skin is warm and dry.      Capillary Refill: Capillary refill takes less than 2 seconds.   Neurological:      General: No focal deficit present.      Mental Status: He is alert and oriented to person, place, and time. Mental status is at baseline.   Psychiatric:         Mood and Affect: Mood normal.         Behavior: Behavior normal.         Thought Content: Thought content normal.         Judgment: Judgment normal.     Ortho Exam   RIGHT knee  There is moderate joint line tenderness at the medial aspect of the knee.   Positive for varus orientation of the knee.   Positive for crepitus throughout range of motion.   Positive for effusion.    Positive patellar grind test.   Negative Lachman test.    Negative anterior and posterior drawer.  Range of motion in extension and flexion is: 0-110 degrees.  Neurovascular status is intact.    Dorsalis pedis and posterior tibial artery pulses are palpable.    Common peroneal nerve function is well preserved.  Gait is cautious and " antalgic.  Positive for soft tissue swelling at anterior tibia.      Result Review :   Radiologic studies - see below for interpretation  Right knee xrays 3views were performed at [facility] on 7/8/21. These images were independently viewed and interpreted by myself, my impression as follows:  · Moderate to severe tricompartmental osteoarthritis with most prominent findings of the patellofemoral compartment and medial compartment.  There is also soft tissue swelling noted anterior to the proximal tibia.        Assessment   Assessment and Plan    Problem List Items Addressed This Visit        Musculoskeletal and Injuries    Primary osteoarthritis of right knee - Primary    Relevant Orders    MRI Knee Right Without Contrast    External [facility] Surgical/Procedural Request    Urinalysis With Culture If Indicated -       Symptoms and Signs    Soft tissue swelling    Relevant Orders    MRI Knee Right Without Contrast      Other Visit Diagnoses     Claustrophobia        Relevant Medications    diazePAM (Valium) 5 MG tablet          Follow Up   · Discussion of any imaging in detail. Discussion of orthopaedic goals.  · Risk, benefits, and merits of treatment alternatives reviewed with the patient. Treatment alternatives include: further imaging/testing of the swelling just below the knee. She would also like to proceed with getting scheduled in the next few months for TKA of right knee.  · Ice, heat, and/or modalities as beneficial  · To schedule MRI of Right knee to further evaluate soft tissue swelling anterior to proximal tibia  The nature of the proposed surgery reviewed with the patient including risks, benefits, rehabilitation, recovery timeframe, and outcome expectations. The patient was seen today for preoperative discussion.  The patient has been tried on over-the-counter and prescription NSAID's despite the risks of anti-inflammatory bleeding, peptic ulcers and erosive gastritis with  short term benefit only.  Braces have been prescribed for mechanical support.  Patient has been participating in an exercise program specifically targeting joint pain relief with limited benefit. Intraarticular injections have been used periodically with some but not complete relief of pain.  Ambulation aids have also been utilized.      · The details of the surgical procedure were explained including the location of probable incisions and a description of the likely hardware/grafts to be used. The patient understands the likely convalescence after surgery as well as the rehabilitation required.  Also, we have thoroughly discussed with the patient the risks, benefits and alternatives to surgery.  Risks include but are not limited to the risk of infection, joint stiffness, limited range of motion, wound healing problems, scar tissue build up, myocardial infarction, stroke, blood clots (including DVT and/or pulmonary embolus along with the risk of death) neurologic and/or vascular injury, limb length discrepancy, fracture, dislocation, nonunion, malunion, continued pain and need for further surgery including hardware failure requiring revision.    · Patient is encouraged to call or return for any issues or concerns.  · Follow up will be based on when MRI has been performed  • Patient was given instructions and counseling regarding her condition or for health maintenance advice. Please see specific information pulled into the AVS if appropriate.     Ajith Knowles PA-C   Date of Encounter: 8/20/2021   Electronically signed by Ajith Knowles PA-C, 08/20/21, 11:13 AM EDT.     EMR Dragon/Transcription disclaimer:  Much of this encounter note is an electronic transcription/translation of spoken language to printed text. The electronic translation of spoken language may permit erroneous, or at times, nonsensical words or phrases to be inadvertently transcribed; Although I have reviewed the note for such errors,  some may still exist.

## 2021-08-24 ENCOUNTER — OFFICE VISIT (OUTPATIENT)
Dept: CARDIOLOGY | Facility: CLINIC | Age: 81
End: 2021-08-24

## 2021-08-24 VITALS
HEART RATE: 58 BPM | DIASTOLIC BLOOD PRESSURE: 70 MMHG | BODY MASS INDEX: 29.16 KG/M2 | SYSTOLIC BLOOD PRESSURE: 136 MMHG | WEIGHT: 175 LBS | HEIGHT: 65 IN

## 2021-08-24 DIAGNOSIS — I10 ESSENTIAL HYPERTENSION: Primary | ICD-10-CM

## 2021-08-24 PROCEDURE — 93000 ELECTROCARDIOGRAM COMPLETE: CPT | Performed by: INTERNAL MEDICINE

## 2021-08-24 PROCEDURE — 99204 OFFICE O/P NEW MOD 45 MIN: CPT | Performed by: INTERNAL MEDICINE

## 2021-08-24 NOTE — PROGRESS NOTES
Cardiology Consult Note  [facility] CARDIOLOG[facility]          Patient Identification:  Violetta Starr      [id number]  81 y.o.        female  1940       Date of Consultation: 8/24/2021    Reason for Consultation: Hypertension    PCP: Antoni Gonzalez MD  Primary cardiologist: Danny Kearns M.D.    History of Present Illness:     Patient is an 81-year-old white female with past medical history significant for hypertension.  She presents due to labile blood pressures.  She states it is mainly in the middle of the night when she gets up to go to the bathroom.  She will feel a little sensation in her chest when her blood pressure is high.  Her highest blood pressure recorded was 177/101.  She denies any exertional chest pain.  She has hydralazine to take as needed.  She limits her salt.  She does note very rare clammy sensation that her primary care thought was secondary to momentary drops in her blood pressure.  These are again very rare.  Her blood pressure during the day seems to run under great control.  She is very concerned about stroke risk as both her brother and father had disabling strokes.  She does take the meloxicam every day due to knee arthritis.  She does not miss any of her medication doses.    Past History:  Past Medical History:   Diagnosis Date   • Anxiety    • Endometrial cancer (CMS/HCC)     Status post surgery and radiation   • Essential hypertension 5/26/2016   • GERD (gastroesophageal reflux disease)    • Insomnia      Past Surgical History:   Procedure Laterality Date   • BLADDER REPAIR     • HYSTERECTOMY     • REPLACEMENT TOTAL KNEE       Allergies   Allergen Reactions   • Amlodipine-Olmesartan Itching   • Amlodipine Rash   • Benazepril Rash   • Losartan Potassium-Hctz Rash     Social History     Socioeconomic History   • Marital status:      Spouse name: Not on file   • Number of children: 3   • Years of education: Not on file   • Highest education level: Not  "on file   Tobacco Use   • Smoking status: Never Smoker   • Smokeless tobacco: Never Used   Vaping Use   • Vaping Use: Never used   Substance and Sexual Activity   • Alcohol use: Never   • Drug use: Never   • Sexual activity: Defer     Family History   Problem Relation Age of Onset   • Breast cancer Mother    • Stroke Father      Medications:  (Not in a hospital admission)    Current medications:    Current IV drips:  No current facility-administered medications for this visit.      ROS  Negative for shortness of breath, chest pain, palpitations,    Physical exam:    /70 (Patient Position: Sitting)   Pulse 58   Ht 165.1 cm (65\")   Wt 79.4 kg (175 lb)   BMI 29.12 kg/m²  Body mass index is 29.12 kg/m².       General Appearance:   · well developed  · well nourished       Respiratory:  · no respiratory distress  · normal breath sounds  · no rales  Cardiovascular:  · no jugular venous distention  · regular rhythm  · apical impulse normal  · S1 normal, S2 normal  · no S3, no S4   · no murmur  · no rub, no thrill  · carotid pulses normal; no bruit  · pedal pulses normal  · lower extremity edema: none      Skin:   · warm, dry  Neuro/Psychiatric:  · judgement and insight appropriate  · normal mood and affect    Cardiographics:    ECG 12 Lead    Date/Time: 8/24/2021 10:49 AM  Performed by: Danny Kearns MD  Authorized by: Danny Kearns MD   Comparison: compared with previous ECG from 9/22/2020  Comparison to previous ECG: No change  Rhythm: sinus rhythm  Ectopy: atrial premature contractions           ECG  (personally reviewed) normal sinus rhythm with 1 PAC   Telemetry:  (personally reviewed)   ECHO:    CARDIOLITE:      Lab Review:           Invalid input(s): PLATELETCT             Invalid input(s): POTASSIUM   CrCl cannot be calculated (Patient's most recent lab result is older than the maximum 30 days allowed.).         Invalid input(s): LDLCALC        No results found for: TSH   No results found " for: HGBA1C        No results found for: DIGOXIN   No components found for: DDIMERQUAN     Imaging:       Assessment:  1.  Labile hypertension  2.  PACs    Initial cardiac assessment:         Recommendations:  1.  Have given the patient multiple options to include #1.  Taking all of her blood pressure medications at night, #2.  Increasing her Norvasc to 10 mg as she takes this at 5 PM each day, #3.  Taking a hydralazine every night before she goes to bed, #4.  Having her take the losartan 50 mg in the morning and another 50 mg in the evening with her Norvasc dose.  She has decided on trying to take her medications all at night and she is going to call us in 1 week and let us know how her blood pressure is running.  If this does not work we are probably going to go with increasing her Norvasc next.  2.  Asked patient to continue to watch her salt.  3.  Asked patient to increase her water intake.  4.  I will see the patient back in 4 weeks but we will continue to monitor this very closely with her calling our office in 1 week.  5.  Patient did have 1 PAC on her EKG and I did hear an extra beat when listening to her.  I did describe the benign nature of these.  She notes no palpitations.      Thank you for allowing us to share in [facility].

## 2021-08-27 DIAGNOSIS — F40.240 CLAUSTROPHOBIA: ICD-10-CM

## 2021-08-27 RX ORDER — DIAZEPAM 5 MG/1
TABLET ORAL
Qty: 2 TABLET | OUTPATIENT
Start: 2021-08-27

## 2021-09-01 ENCOUNTER — TELEPHONE (OUTPATIENT)
Dept: CARDIOLOGY | Facility: CLINIC | Age: 81
End: 2021-09-01

## 2021-09-01 NOTE — TELEPHONE ENCOUNTER
"Received VM from patient stating that she was instructed to call in 1 week after making medication adjustments. Stated that she has been feeling much better.     Reviewed Dr. Kearns's office note which stated patient was to call with BP readings. Returned call to patient. Asked patient how her BP was running and she stated \"much better.\" Asked if she had any readings written down and she stated that she didn't write the readings down. Stated that she remembers her highest  and the lowest was 129.    Advised patient that I would let Dr. Kearns know. Asked patient to complete a 1 week BP log where she takes her BP in the morning and in the evening and writes them down on a piece of paper. Advised patient that she can either drop it off at the [place] office for Dr. Kearns to review or call me back with the readings.     Patient verbalized understanding.  "

## 2021-09-03 ENCOUNTER — HOSPITAL ENCOUNTER (OUTPATIENT)
Dept: MRI IMAGING | Facility: HOSPITAL | Age: 81
Discharge: HOME | End: 2021-09-03
Admitting: PHYSICIAN ASSISTANT

## 2021-09-03 DIAGNOSIS — R22.9 SOFT TISSUE SWELLING: ICD-10-CM

## 2021-09-03 DIAGNOSIS — M17.11 PRIMARY OSTEOARTHRITIS OF RIGHT KNEE: ICD-10-CM

## 2021-09-03 PROCEDURE — 73721 MRI JNT OF LWR EXTRE W/O DYE: CPT

## 2021-09-12 DIAGNOSIS — I10 ESSENTIAL HYPERTENSION: ICD-10-CM

## 2021-09-13 RX ORDER — HYDRALAZINE HYDROCHLORIDE 10 MG/1
TABLET, FILM COATED ORAL
Qty: 30 TABLET | Refills: 1 | Status: SHIPPED | OUTPATIENT
Start: 2021-09-13 | End: 2021-09-17 | Stop reason: SDUPTHER

## 2021-09-14 ENCOUNTER — TELEPHONE (OUTPATIENT)
Dept: FAMILY MEDICINE CLINIC | Age: 81
End: 2021-09-14

## 2021-09-14 ENCOUNTER — OFFICE VISIT (OUTPATIENT)
Dept: ORTHOPEDIC SURGERY | Facility: CLINIC | Age: 81
End: 2021-09-14

## 2021-09-14 DIAGNOSIS — M17.11 PRIMARY OSTEOARTHRITIS OF RIGHT KNEE: Primary | ICD-10-CM

## 2021-09-14 DIAGNOSIS — R22.9 SOFT TISSUE SWELLING: ICD-10-CM

## 2021-09-14 PROCEDURE — 99441 PR PHYS/QHP TELEPHONE EVALUATION 5-10 MIN: CPT | Performed by: PHYSICIAN ASSISTANT

## 2021-09-14 NOTE — PROGRESS NOTES
You have chosen to receive care through a telephone visit. Do you consent to use a telephone visit for your medical care today? Yes      Chief Complaint  Follow-up and Results of the Right Knee    Subjective    History of Present Illness      Violetta Starr is a 81 y.o. female who presents to [facility] for follow-up on complaint of   Right knee Pain:    The pain is located over medial aspect.    She also reported swelling in the front of the leg below the knee for 1-2 months. She denies pain in that area.  Radiologist recommended MRI or CT to further assess the swelling in the leg. She also decided at the last visit that she wanted to be scheduled for TKA and we are awaiting scheduling.         Objective   Vital Signs:   There were no vitals taken for this visit.    Physical Exam  Vitals signs and nursing note reviewed.   Constitutional:       Appearance: Normal appearance.   Pulmonary:      Effort: Pulmonary effort is normal.   Skin:     General: Skin is warm and dry.      Capillary Refill: Capillary refill takes less than 2 seconds.   Neurological:      General: No focal deficit present.      Mental Status: He is alert and oriented to person, place, and time. Mental status is at baseline.   Psychiatric:         Mood and Affect: Mood normal.         Behavior: Behavior normal.         Thought Content: Thought content normal.         Judgment: Judgment normal.     Ortho Exam   RIGHT knee  There is moderate joint line tenderness at the medial aspect of the knee.   Positive for varus orientation of the knee.   Positive for crepitus throughout range of motion.   Positive for effusion.    Positive patellar grind test.   Negative Lachman test.    Negative anterior and posterior drawer.  Range of motion in extension and flexion is: 0-110 degrees.  Neurovascular status is intact.    Dorsalis pedis and posterior tibial artery pulses are palpable.    Common peroneal nerve function is well  preserved.  Gait is cautious and antalgic.  Positive for soft tissue swelling at anterior tibia.      Result Review :   Radiologic studies - see below for interpretation  Reviewed MRI report of Right knee without contrast, performed at  [facility] on 9/3/21, summary of impression below:  · Medial meniscus body extruded medially and demonstrates complex tears exiting the superior and inferior surfaces with longitudinal tear is noted in the posterior horn of the medial meniscus  · There is a 1.5 cm x 0.3 cm focus in the medial joint space that may represent a meniscal fragment  · Lateral meniscus shows findings that likely represent a small tear in the posterior horn as well as degenerative change with probable superimposed tearing/fraying  · Findings consistent with MCL bursitis  · Small joint effusion  · Along the entirety of the patella there is moderate to severe cartilage thinning  · At the weightbearing aspect of the medial femoral condyle there is full-thickness cartilage loss measuring 2.1 cm transverse  · Medial tibial plateau shows similar findings with 1.9 cm transverse  · Lateral compartment cartilage shows chondromalacia  · Patellofemoral medial compartments show prominent osteophyte formation  · IT band bursitis is noted  · Pretibial soft tissue edema noted which likely corresponds to abnormality described on plain film x-ray--redundancy of soft tissues or lipoma  · 1.4 cm popliteal cyst          Assessment   Assessment and Plan    Problem List Items Addressed This Visit        Musculoskeletal and Injuries    Primary osteoarthritis of right knee - Primary       Symptoms and Signs    Soft tissue swelling          Follow Up   · Discussion of any imaging in detail. Discussion of orthopaedic goals.  · Risk, benefits, and merits of treatment alternatives reviewed with the patient. Discussed findings of Mri and pretibial swelling.   · Ice, heat, and/or modalities as beneficial  · Patient  is encouraged to call or return for any issues or concerns.  • Patient was given instructions and counseling regarding her condition or for health maintenance advice. Please see specific information pulled into the AVS if appropriate.     This visit has been rescheduled as a phone visit to comply with patient safety concerns in accordance with CDC recommendations. Total time of discussion was 5 minutes.    Ajith Knowles PA-C   Date of Encounter: 9/14/2021   Electronically signed by Ajith Knowles PA-C, 09/26/21, 8:42 PM EDT.           EMR Dragon/Transcription disclaimer:  Much of this encounter note is an electronic transcription/translation of spoken language to printed text. The electronic translation of spoken language may permit erroneous, or at times, nonsensical words or phrases to be inadvertently transcribed; Although I have reviewed the note for such errors, some may still exist.

## 2021-09-14 NOTE — TELEPHONE ENCOUNTER
Pt inf re: tickle for mammo, she states her  has recently started chemo and then had a heart attack, so she would like to hold off on scheduling anything at this time, she will call back when things have calmed down.

## 2021-09-15 ENCOUNTER — TELEPHONE (OUTPATIENT)
Dept: CARDIOLOGY | Facility: CLINIC | Age: 81
End: 2021-09-15

## 2021-09-15 NOTE — TELEPHONE ENCOUNTER
Patient called wanting to make sure Dr. Kearns reviewed BP log.     Confirmed taking losartan 100 mg daily, atenolol 50 mg daily, amlodipine 5 mg daily, and hydralazine as needed.    Advised patient that I would make sure Dr. Kearns reviews and call back with any changes.

## 2021-09-17 DIAGNOSIS — I10 ESSENTIAL HYPERTENSION: ICD-10-CM

## 2021-09-17 RX ORDER — AMLODIPINE BESYLATE 10 MG/1
10 TABLET ORAL DAILY
Qty: 90 TABLET | Refills: 3 | Status: SHIPPED | OUTPATIENT
Start: 2021-09-17 | End: 2022-06-16 | Stop reason: SDUPTHER

## 2021-09-17 RX ORDER — HYDRALAZINE HYDROCHLORIDE 10 MG/1
TABLET, FILM COATED ORAL
Qty: 90 TABLET | Refills: 3 | Status: SHIPPED | OUTPATIENT
Start: 2021-09-17

## 2021-09-17 NOTE — TELEPHONE ENCOUNTER
Received following from Dr. Kearns:     Call pt.  These are not too bad.  Does she want to try to increase norvasc to 10 mg qd and see if we can get them a little better?     S/W patient and she is in agreement to increase norvasc.

## 2021-09-21 ENCOUNTER — OFFICE VISIT (OUTPATIENT)
Dept: CARDIOLOGY | Facility: CLINIC | Age: 81
End: 2021-09-21

## 2021-09-21 VITALS
WEIGHT: 181 LBS | HEART RATE: 60 BPM | DIASTOLIC BLOOD PRESSURE: 54 MMHG | SYSTOLIC BLOOD PRESSURE: 126 MMHG | BODY MASS INDEX: 30.16 KG/M2 | HEIGHT: 65 IN

## 2021-09-21 DIAGNOSIS — I10 ESSENTIAL HYPERTENSION: Primary | ICD-10-CM

## 2021-09-21 PROCEDURE — 99214 OFFICE O/P EST MOD 30 MIN: CPT | Performed by: INTERNAL MEDICINE

## 2021-09-21 NOTE — PROGRESS NOTES
Chief Complaint  Hypertension ( 4 weeks follow up.)    Subjective            Violetta Starr presents to [facility]  History of present illness:    Patient states overall she has been doing okay although she is having again elevated blood pressures.  She did not start taking the medications at night and she did not  the prescription for the higher dose of the amlodipine either.  She seems to have gotten confused after seeing us last.      Past Medical History:   Diagnosis Date   • Anxiety    • Endometrial cancer (CMS/HCC)     Status post surgery and radiation   • Essential hypertension 5/26/2016   • GERD (gastroesophageal reflux disease)    • Insomnia            Past Surgical History:   Procedure Laterality Date   • BLADDER REPAIR     • HYSTERECTOMY     • REPLACEMENT TOTAL KNEE            Social History     Socioeconomic History   • Marital status:      Spouse name: Not on file   • Number of children: 3   • Years of education: Not on file   • Highest education level: Not on file   Tobacco Use   • Smoking status: Never Smoker   • Smokeless tobacco: Never Used   Vaping Use   • Vaping Use: Never used   Substance and Sexual Activity   • Alcohol use: Never   • Drug use: Never   • Sexual activity: Defer           Family History   Problem Relation Age of Onset   • Breast cancer Mother    • Stroke Father             Allergies   Allergen Reactions   • Amlodipine-Olmesartan Itching   • Amlodipine Rash   • Benazepril Rash            Current Outpatient Medications:   •  amLODIPine (NORVASC) 10 MG tablet, Take 1 tablet by mouth Daily., Disp: 90 tablet, Rfl: 3  •  atenolol (TENORMIN) 50 MG tablet, Take 1 tablet by mouth Daily., Disp: 90 tablet, Rfl: 1  •  hydrALAZINE (APRESOLINE) 10 MG tablet, Take 1 tablet by mouth twice a day as needed for systolic blood pressure greater than 160., Disp: 90 tablet, Rfl: 3  •  LORazepam (ATIVAN) 0.5 MG tablet, Take 0.5 mg by mouth Every 8 (Eight) Hours  "As Needed for Anxiety., Disp: , Rfl:   •  losartan (COZAAR) 100 MG tablet, Take 1 tablet by mouth Daily., Disp: 90 tablet, Rfl: 1  •  meloxicam (MOBIC) 15 MG tablet, Take 1 tablet by mouth Daily., Disp: 90 tablet, Rfl: 1  •  omeprazole (priLOSEC) 40 MG capsule, Take 1 capsule by mouth Daily., Disp: 90 capsule, Rfl: 1  •  diazePAM (Valium) 5 MG tablet, Take 1 tablet 30 mins prior to procedure, repeat at time of procedure if needed, Disp: 2 tablet, Rfl: 0  •  Fluzone High-Dose Quadrivalent 0.7 ML suspension prefilled syringe, ADM 0.7ML IM UTD, Disp: , Rfl:       ROS:  Negative cardiac review of systems    Objective     /54 (BP Location: Right arm, Patient Position: Sitting)   Pulse 60   Ht 165.1 cm (65\")   Wt 82.1 kg (181 lb)   BMI 30.12 kg/m²       General Appearance:   · well developed  · well nourished  HENT:   · oropharynx moist  · lips not cyanotic  Respiratory:  · no respiratory distress  · normal breath sounds  · no rales  Cardiovascular:  · no jugular venous distention  · regular rhythm  · S1 normal, S2 normal  · no S3, no S4   · no murmur  · no rub, no thrill  · No carotid bruit  · pedal pulses normal  · lower extremity edema: none    Musculoskeletal:  · no clubbing of fingers.   · normocephalic, head atraumatic  Skin:   · warm, dry  Psychiatric:  · judgement and insight appropriate  · normal mood and affect          Procedures                      ASSESSMENT:  Encounter Diagnosis   Name Primary?   • Essential hypertension Yes         PLAN:    1.  Patient has very labile hypertension.  2.  The patient did not follow the instructions from the last time we saw her.  She did not make any of the changes that I recommended.  I did write down this time exactly what we are going to do.  She is going to take the losartan 100 mg at night, the atenolol 50 mg at night, and the higher dose amlodipine at 10 mg at night.  3.  Patient will call us in 1 week and let us know how her blood pressure is doing.  4.  If " this does not improve the blood pressure at that time I would consider switching her atenolol to a beta-blocker that also contains an alpha blocker component.  5.  Asked the patient to avoid the meloxicam as much as possible.  I did tell her to try Tylenol instead.          Patient was given instructions and counseling regarding her condition or for health maintenance advice. Please see specific information pulled into the AVS if appropriate.         Danny Kearns MD   9/21/2021  11:54 EDT

## 2021-09-27 ENCOUNTER — TELEPHONE (OUTPATIENT)
Dept: ORTHOPEDIC SURGERY | Facility: CLINIC | Age: 81
End: 2021-09-27

## 2021-10-08 ENCOUNTER — OFFICE VISIT (OUTPATIENT)
Dept: ORTHOPEDIC SURGERY | Facility: CLINIC | Age: 81
End: 2021-10-08

## 2021-10-08 ENCOUNTER — HOSPITAL ENCOUNTER (OUTPATIENT)
Dept: GENERAL RADIOLOGY | Facility: HOSPITAL | Age: 81
Discharge: HOME | End: 2021-10-08

## 2021-10-08 VITALS — WEIGHT: 181 LBS | BODY MASS INDEX: 30.16 KG/M2 | TEMPERATURE: 98 F | HEIGHT: 65 IN

## 2021-10-08 DIAGNOSIS — M25.559 HIP PAIN: ICD-10-CM

## 2021-10-08 DIAGNOSIS — M54.50 LOW BACK PAIN, UNSPECIFIED BACK PAIN LATERALITY, UNSPECIFIED CHRONICITY, UNSPECIFIED WHETHER SCIATICA PRESENT: ICD-10-CM

## 2021-10-08 DIAGNOSIS — M54.42 RIGHT-SIDED LOW BACK PAIN WITH BILATERAL SCIATICA, UNSPECIFIED CHRONICITY: ICD-10-CM

## 2021-10-08 DIAGNOSIS — M25.559 HIP PAIN: Primary | ICD-10-CM

## 2021-10-08 DIAGNOSIS — M54.41 RIGHT-SIDED LOW BACK PAIN WITH BILATERAL SCIATICA, UNSPECIFIED CHRONICITY: ICD-10-CM

## 2021-10-08 PROCEDURE — 72110 X-RAY EXAM L-2 SPINE 4/>VWS: CPT

## 2021-10-08 PROCEDURE — 73522 X-RAY EXAM HIPS BI 3-4 VIEWS: CPT

## 2021-10-08 PROCEDURE — 99214 OFFICE O/P EST MOD 30 MIN: CPT | Performed by: PHYSICIAN ASSISTANT

## 2021-10-08 NOTE — PROGRESS NOTES
"Chief Complaint  Pain of the Right Hip and Pain of the Right Leg    Subjective    History of Present Illness      Violetta Starr is a 81 y.o. female who presents to [facility] for new complaint of Hip Pain: Patient complains of right hip/low back pain. Onset of the symptoms was 6 weeks ago.  She reports prior history of same symptoms but they usually resolve.  Inciting event: none.   Current symptoms include is aggravated by walking and is worse with driving.   She denies other bowel dysfunction.  Patient's overall course: gradually worsening.   She reports the symptoms are worse upon waking in the morning and that she has to use a walker to assist with ambulation.    She reports the symptoms radiate into the lateral aspect of the leg into the ankle.  She reports occasional symptoms on the left.      Objective   Vital Signs:   Temp 98 °F (36.7 °C)   Ht 165.1 cm (65\")   Wt 82.1 kg (181 lb)   BMI 30.12 kg/m²     Physical Exam  Vitals signs and nursing note reviewed.   Constitutional:       Appearance: Normal appearance.   Pulmonary:      Effort: Pulmonary effort is normal.   Skin:     General: Skin is warm and dry.      Capillary Refill: Capillary refill takes less than 2 seconds.   Neurological:      General: No focal deficit present.      Mental Status: He is alert and oriented to person, place, and time. Mental status is at baseline.   Psychiatric:         Mood and Affect: Mood normal.         Behavior: Behavior normal.         Thought Content: Thought content normal.         Judgment: Judgment normal.     Ortho Exam   LUMBAR spine  The overlying skin and soft tissues are mildly swollen.   Deep tendon reflexes are bilaterally, symmetric and equal.    No long tract signs are noted. There is No evidence of myelopathy.   No bowel or bladder deficit noted. Mild spasm of erector spinae muscle is noted.   Straight leg raise test is positive to 60 degrees. Contralateral straight leg raise " is negative.   No objective motor or sensory function loss is noted.         Result Review :   Radiologic studies - see below for interpretation  RIGHT hip with pelvis 3 views and LUMBAR spine xrays 4+ views were ordered by Ajith Knowles PA-C. Performed at [facility] on 10/8/2021. Images were independently viewed and interpreted by myself, my impression as follows:  Findings: Hip: Mild degenerative changes throughout the hip pelvis.  L-spine: Moderate -marked degenerative changes noted at L4-L5 with L4 subluxed anteriorly in comparison to L5.  Also shows moderate to marked degenerative changes at L5-S1.  Bony lesion: no  Soft tissues: within normal limits  Joint spaces: decreased  Hardware appropriately positioned: not applicable  Prior studies available for comparison: no         Assessment   Assessment and Plan    Problem List Items Addressed This Visit        Musculoskeletal and Injuries    Low back pain    Relevant Orders    XR Spine Lumbar Complete 4+VW (Completed)    XR Hips Bilateral With or Without Pelvis 3-4 View (Completed)    Ambulatory Referral to Physical Therapy Evaluate and treat (Completed)    Hip pain - Primary    Relevant Orders    XR Spine Lumbar Complete 4+VW (Completed)    XR Hips Bilateral With or Without Pelvis 3-4 View (Completed)    Ambulatory Referral to Physical Therapy Evaluate and treat (Completed)          Follow Up   · Discussion of any imaging in detail. Discussion of orthopaedic goals.  · Risk, benefits, and merits of treatment alternatives reviewed with the patient. Treatment alternatives include: physical therapy, referral to pain management and further imaging/testing.  She is in agreement with going to physical therapy.  She will let me know if her symptoms worsen or if there are any changes in case we need referral for MRI.  · Ice, heat, and/or modalities as beneficial  · Physical therapy referral given  · Patient is encouraged to call or return for  any issues or concerns.  · Follow up in 4-6 weeks  • Patient was given instructions and counseling regarding her condition or for health maintenance advice. Please see specific information pulled into the AVS if appropriate.     Ajith Knowles PA-C   Date of Encounter: 10/8/2021   Electronically signed by Ajith Knowles PA-C, 10/08/21, 6:55 AM EDT.   Electronically signed by Ajith Knowles PA-C, 10/08/21, 1:22 PM EDT.      EMR Dragon/Transcription disclaimer:  Much of this encounter note is an electronic transcription/translation of spoken language to printed text. The electronic translation of spoken language may permit erroneous, or at times, nonsensical words or phrases to be inadvertently transcribed; Although I have reviewed the note for such errors, some may still exist.

## 2021-11-16 ENCOUNTER — OFFICE VISIT (OUTPATIENT)
Dept: CARDIOLOGY | Facility: CLINIC | Age: 81
End: 2021-11-16

## 2021-11-16 VITALS
HEART RATE: 64 BPM | HEIGHT: 65 IN | WEIGHT: 180 LBS | DIASTOLIC BLOOD PRESSURE: 71 MMHG | BODY MASS INDEX: 29.99 KG/M2 | SYSTOLIC BLOOD PRESSURE: 144 MMHG

## 2021-11-16 DIAGNOSIS — I10 ESSENTIAL HYPERTENSION: Primary | ICD-10-CM

## 2021-11-16 PROCEDURE — 99212 OFFICE O/P EST SF 10 MIN: CPT | Performed by: INTERNAL MEDICINE

## 2021-11-16 NOTE — PROGRESS NOTES
Chief Complaint  Hypertension    Subjective            Violetta Starr presents to [facility]  History of present illness:    Patient states overall she feels like she is doing much better.  She is still checking her blood pressure regularly at night but has only had to take 2 of the hydralazine since we saw her last.  She took these for just mildly elevated systolic blood pressures in the 161 and 167 mmHg range.  She has had no really high values.  She continues to check it regularly.  She is taking the blood pressure pills at night and the higher dose of the amlodipine at night since we saw her last.  She notes no exertional chest pain.  She notes chronic right leg edema that does not bother her.  She does watch her salt closely.      Past Medical History:   Diagnosis Date   • Anxiety    • Endometrial cancer (HCC)     Status post surgery and radiation   • Essential hypertension 5/26/2016   • GERD (gastroesophageal reflux disease)    • Insomnia            Past Surgical History:   Procedure Laterality Date   • BLADDER REPAIR     • HYSTERECTOMY     • REPLACEMENT TOTAL KNEE            Social History     Socioeconomic History   • Marital status:    • Number of children: 3   Tobacco Use   • Smoking status: Never Smoker   • Smokeless tobacco: Never Used   Vaping Use   • Vaping Use: Never used   Substance and Sexual Activity   • Alcohol use: Never   • Drug use: Never   • Sexual activity: Defer           Family History   Problem Relation Age of Onset   • Breast cancer Mother    • Stroke Father             Allergies   Allergen Reactions   • Amlodipine-Olmesartan Itching   • Amlodipine Rash   • Benazepril Rash            Current Outpatient Medications:   •  amLODIPine (NORVASC) 10 MG tablet, Take 1 tablet by mouth Daily., Disp: 90 tablet, Rfl: 3  •  atenolol (TENORMIN) 50 MG tablet, Take 1 tablet by mouth Daily., Disp: 90 tablet, Rfl: 1  •  hydrALAZINE (APRESOLINE) 10 MG tablet, Take 1  "tablet by mouth twice a day as needed for systolic blood pressure greater than 160., Disp: 90 tablet, Rfl: 3  •  LORazepam (ATIVAN) 0.5 MG tablet, Take 0.5 mg by mouth Every 8 (Eight) Hours As Needed for Anxiety., Disp: , Rfl:   •  losartan (COZAAR) 100 MG tablet, Take 1 tablet by mouth Daily., Disp: 90 tablet, Rfl: 1  •  NAPROXEN DR PO, Take 500 mg by mouth As Needed., Disp: , Rfl:   •  omeprazole (priLOSEC) 40 MG capsule, Take 1 capsule by mouth Daily., Disp: 90 capsule, Rfl: 1  •  meloxicam (MOBIC) 15 MG tablet, Take 1 tablet by mouth Daily., Disp: 90 tablet, Rfl: 1      ROS:  Cardiac review of systems is negative.    Objective     /71   Pulse 64   Ht 165.1 cm (65\")   Wt 81.6 kg (180 lb)   BMI 29.95 kg/m²       General Appearance:   · well developed  · well nourished  HENT:   · oropharynx moist  · lips not cyanotic  Respiratory:  · no respiratory distress  · normal breath sounds  · no rales  Cardiovascular:  · no jugular venous distention  · regular rhythm  · S1 normal, S2 normal  · no S3, no S4   · no murmur  · no rub, no thrill  · No carotid bruit  · pedal pulses normal  · lower extremity edema: none    Musculoskeletal:  · no clubbing of fingers.   · normocephalic, head atraumatic  Skin:   · warm, dry  Psychiatric:  · judgement and insight appropriate  · normal mood and affect          Procedures                      ASSESSMENT:  Encounter Diagnosis   Name Primary?   • Essential hypertension Yes         PLAN:    1.  Patient's blood pressure is under much better control.  She is only had to take the hydralazine twice since we saw her and this was for mildly elevated systolic blood pressures in the 161 to 167 mmHg range.  She is going to call us if she starts having to take the hydralazine more often.  At that time we would probably switch her atenolol to both a beta and alpha blocking agent.  2.  Asked patient to continue to watch her salt closely.  3.  The patient is still taking some naproxen for the " sciatica type pain.  She has been to physical therapy for 6 weeks and really has not noted any difference.  I did tell her to talk with her primary care physician and see if a cortisone shot at this time would be helpful.  We are trying to limit the p.o. anti-inflammatories as much as possible.  4.  We will bring the patient back in 3 months but again she will call us if she is having to use the hydralazine more frequently.  If she is doing well at 3 months we may be able to just see her back as needed.        Patient was given instructions and counseling regarding her condition or for health maintenance advice. Please see specific information pulled into the AVS if appropriate.         Danny Kearns MD   11/16/2021  11:45 EST

## 2021-11-17 ENCOUNTER — TELEPHONE (OUTPATIENT)
Dept: ORTHOPEDIC SURGERY | Facility: CLINIC | Age: 81
End: 2021-11-17

## 2021-11-17 NOTE — TELEPHONE ENCOUNTER
Patient calling stating the therapy did not help her and she was told to call back if it did not and we would give her an injection.  She is scheduled with Dr. Payne on 12/9 for a yearly follow up of her total knee.  I have scheduled the SI joint injection along with the yearly follow up so the patient won't have multiple appointments.

## 2021-12-06 DIAGNOSIS — Z96.652 S/P TKR (TOTAL KNEE REPLACEMENT), LEFT: Primary | ICD-10-CM

## 2021-12-09 ENCOUNTER — OFFICE VISIT (OUTPATIENT)
Dept: ORTHOPEDIC SURGERY | Facility: CLINIC | Age: 81
End: 2021-12-09

## 2021-12-09 ENCOUNTER — HOSPITAL ENCOUNTER (OUTPATIENT)
Dept: GENERAL RADIOLOGY | Facility: HOSPITAL | Age: 81
Discharge: HOME | End: 2021-12-09
Admitting: ORTHOPAEDIC SURGERY

## 2021-12-09 VITALS — TEMPERATURE: 98.7 F | BODY MASS INDEX: 30.73 KG/M2 | WEIGHT: 180 LBS | HEIGHT: 64 IN

## 2021-12-09 DIAGNOSIS — M54.42 RIGHT-SIDED LOW BACK PAIN WITH BILATERAL SCIATICA, UNSPECIFIED CHRONICITY: ICD-10-CM

## 2021-12-09 DIAGNOSIS — Z96.652 S/P TKR (TOTAL KNEE REPLACEMENT), LEFT: ICD-10-CM

## 2021-12-09 DIAGNOSIS — Z96.652 S/P TKR (TOTAL KNEE REPLACEMENT), LEFT: Primary | ICD-10-CM

## 2021-12-09 DIAGNOSIS — M54.41 RIGHT-SIDED LOW BACK PAIN WITH BILATERAL SCIATICA, UNSPECIFIED CHRONICITY: ICD-10-CM

## 2021-12-09 PROCEDURE — 73560 X-RAY EXAM OF KNEE 1 OR 2: CPT

## 2021-12-09 PROCEDURE — 99213 OFFICE O/P EST LOW 20 MIN: CPT | Performed by: ORTHOPAEDIC SURGERY

## 2021-12-09 PROCEDURE — 20610 DRAIN/INJ JOINT/BURSA W/O US: CPT | Performed by: ORTHOPAEDIC SURGERY

## 2021-12-09 RX ADMIN — TRIAMCINOLONE ACETONIDE 80 MG: 40 INJECTION, SUSPENSION INTRA-ARTICULAR; INTRAMUSCULAR at 10:01

## 2021-12-09 NOTE — PROGRESS NOTES
INJECTION    Patient: Violetta Starr    YOB: 1940    MRN: [id number]    Chief Complaint   Patient presents with   • Right Hip - Follow-up, Pain   • Left Knee - Follow-up, Pain       History of Present Illness: Patient returns for hip pain. The pain is over the posterior aspect of the right hip at the sacroiliac joint. It has been progressive in nature but remains intermittent . Her is worsened by prolonged standing or walking, cross body activities and laying on the affected side. She denies numbness and tingling. She has had improvement in the past with injections.   She states that she has done extremely well following her left total knee arthroplasty.  Range of motion is improved considerably with physical therapy.  The surgical procedure enhance her quality of life significantly for the better.  She is very pleased with her outcome.    This problem is not new to this examiner.     Allergies:   Allergies   Allergen Reactions   • Amlodipine-Olmesartan Itching   • Amlodipine Rash   • Benazepril Rash       Medications:   Home Medications:  Current Outpatient Medications on File Prior to Visit   Medication Sig   • amLODIPine (NORVASC) 10 MG tablet Take 1 tablet by mouth Daily.   • atenolol (TENORMIN) 50 MG tablet Take 1 tablet by mouth Daily.   • hydrALAZINE (APRESOLINE) 10 MG tablet Take 1 tablet by mouth twice a day as needed for systolic blood pressure greater than 160.   • LORazepam (ATIVAN) 0.5 MG tablet Take 0.5 mg by mouth Every 8 (Eight) Hours As Needed for Anxiety.   • losartan (COZAAR) 100 MG tablet Take 1 tablet by mouth Daily.   • NAPROXEN DR PO Take 500 mg by mouth As Needed.   • omeprazole (priLOSEC) 40 MG capsule Take 1 capsule by mouth Daily.     No current facility-administered medications on file prior to visit.     Current Medications:  Scheduled Meds:  PRN Meds:.    I have reviewed the patient's medical history in detail and updated the computerized patient record.  Review and  "summarization of old records include:    Past Medical History:   Diagnosis Date   • Anxiety    • Endometrial cancer (HCC)     Status post surgery and radiation   • Essential hypertension 5/26/2016   • GERD (gastroesophageal reflux disease)    • Insomnia      Past Surgical History:   Procedure Laterality Date   • BLADDER REPAIR     • HYSTERECTOMY     • REPLACEMENT TOTAL KNEE       Social History     Occupational History   • Occupation: Retired   Tobacco Use   • Smoking status: Never Smoker   • Smokeless tobacco: Never Used   Vaping Use   • Vaping Use: Never used   Substance and Sexual Activity   • Alcohol use: Never   • Drug use: Never   • Sexual activity: Defer      Social History     Social History Narrative   • Not on file     Family History   Problem Relation Age of Onset   • Breast cancer Mother    • Stroke Father        Review of Systems        Wt Readings from Last 3 Encounters:   12/09/21 81.6 kg (180 lb)   11/16/21 81.6 kg (180 lb)   10/08/21 82.1 kg (181 lb)     Ht Readings from Last 3 Encounters:   12/09/21 162.6 cm (64\")   11/16/21 165.1 cm (65\")   10/08/21 165.1 cm (65\")     Body mass index is 30.9 kg/m².  Facility age limit for growth percentiles is 20 years.  Vitals:    12/09/21 0951   Temp: 98.7 °F (37.1 °C)       Physical Exam    Musculoskeletal:    Right SI JOINT: Mild tenderness is present dorsally over the SI joint. Figure of 4 sign is positive. There is mild spasm present in the erector spinae muscle. Flexion and extension are associated with discomfort. Deep tendon reflexes are intact and symmetrical on both lower extremities. Pain radiates into the buttock on extension of the hip. No evidence of cauda equina syndrome. No motor or sensory deficit is noted. There is no bowel or bladder involvement.    left knee.The patient is status post total knee arthroplasty postoperative 14 month(s). Incision is clean. Calf is soft and nontender. Homans sign is negative. There is no clicking, popping or " catching. Anterior and posterior drawer signs are negative.  There is no instability of the components. Appropriate amounts of swelling and bruising are noted. Dorsalis pedis and posterior tibial artery pulses are palpable. Common peroneal nerve function is well preserved. Range of motion is from 0- 120 degrees of flexion. Gait is cautious but otherwise fairly normal. There is no evidence of a deep seated joint infection.  Diagnostics:  left Knee X-Ray  Indication: evaluation of left total knee arthroplasty  AP, Lateral views  Findings: Well-placed implants with a good cement mantle without any subsidence of the implants  no bony lesion  Soft tissues within normal limits  within normal limits joint spaces  Hardware appropriately positioned yes      yes prior studies available for comparison.      X-RAY was ordered and reviewed by Charan Payne MD    Procedure:  Large Joint Arthrocentesis: R hip joint  Date/Time: 12/9/2021 10:01 AM  Consent given by: patient  Site marked: site marked  Timeout: Immediately prior to procedure a time out was called to verify the correct patient, procedure, equipment, support staff and site/side marked as required   Supporting Documentation  Indications: pain   Procedure Details  Location: hip - R hip joint  Preparation: Patient was prepped and draped in the usual sterile fashion  Needle size: 25 G  Approach: anteromedial  Medications administered: 2 mL lidocaine (cardiac); 80 mg triamcinolone acetonide 40 MG/ML  Patient tolerance: patient tolerated the procedure well with no immediate complications            Assessment:   Diagnoses and all orders for this visit:    1. S/P TKR (total knee replacement), left (Primary)    2. Right-sided low back pain with bilateral sciatica, unspecified chronicity    Other orders  -     Large Joint Arthrocentesis: R hip joint          Plan:   · Injected patient's right hip-sacroiliac joint(s)with Kenalog from an posterior approach   · Compression/brace to  the knee to prevent it from buckling and giving out.  · , GI and dental procedure prophylaxis with antibiotics to prevent metastatic infection of the knee arthroplasty implants.  · Rest, ice, compression, and elevation (RICE) therapy  · OTC Alternate Ibuprofen and Tylenol as needed  · Follow up in 6 month(s)    Date of encounter: 12/09/2021   Charan Payne MD

## 2021-12-10 RX ORDER — TRIAMCINOLONE ACETONIDE 40 MG/ML
80 INJECTION, SUSPENSION INTRA-ARTICULAR; INTRAMUSCULAR
Status: COMPLETED | OUTPATIENT
Start: 2021-12-09 | End: 2021-12-09

## 2022-01-24 ENCOUNTER — TELEPHONE (OUTPATIENT)
Dept: ORTHOPEDIC SURGERY | Facility: CLINIC | Age: 82
End: 2022-01-24

## 2022-01-24 DIAGNOSIS — M54.41 RIGHT-SIDED LOW BACK PAIN WITH BILATERAL SCIATICA, UNSPECIFIED CHRONICITY: Primary | ICD-10-CM

## 2022-01-24 DIAGNOSIS — M54.42 RIGHT-SIDED LOW BACK PAIN WITH BILATERAL SCIATICA, UNSPECIFIED CHRONICITY: Primary | ICD-10-CM

## 2022-01-24 NOTE — TELEPHONE ENCOUNTER
Please let her know I can send her to a neurosurgeon, but they will want an MRI  of the back first. Is she okay with me ordering that?

## 2022-01-24 NOTE — TELEPHONE ENCOUNTER
Caller: MATIAS WHEELER    Relationship to patient: SELF    Best call back number:     Additional notes:PATIENT WOULD LIKE TO MOVE FORWARD WITH SEEING A NEUROLOGIST PLEASE CONTACT HER

## 2022-01-28 DIAGNOSIS — F40.240 CLAUSTROPHOBIA: Primary | ICD-10-CM

## 2022-01-28 RX ORDER — DIAZEPAM 10 MG/1
TABLET ORAL
Qty: 1 TABLET | Refills: 0 | Status: SHIPPED | OUTPATIENT
Start: 2022-01-28 | End: 2022-06-09

## 2022-02-03 DIAGNOSIS — M25.559 HIP PAIN: Primary | ICD-10-CM

## 2022-02-03 RX ORDER — TRAMADOL HYDROCHLORIDE 50 MG/1
TABLET ORAL
Qty: 30 TABLET | Refills: 0 | Status: SHIPPED | OUTPATIENT
Start: 2022-02-03 | End: 2022-03-10

## 2022-02-03 NOTE — TELEPHONE ENCOUNTER
Please call me in a prescription of Ultram 50 mg tab 1 p.o. every 12 as needed pain total 30 pills no refills for this patient.  I can sign off on it electronically thank you

## 2022-02-03 NOTE — TELEPHONE ENCOUNTER
PATIENT IS ASKING FOR SOMETHING FOR PAIN IN HER HIP.    HER MRI HAS BEEN CANCELLED FOR TOMORROW DUE TO THE WEATHER. IT HAS BEEN RESCHEDULED ON FEB 18    SHE STATES SHE IS TAKING 2 TYLENOL EVERY FOUR HOURS AND IS NO LONGER AS HELPFUL.    NO ALLEGIES

## 2022-02-04 ENCOUNTER — HOSPITAL ENCOUNTER (OUTPATIENT)
Dept: MRI IMAGING | Facility: HOSPITAL | Age: 82
End: 2022-02-04

## 2022-02-18 ENCOUNTER — HOSPITAL ENCOUNTER (OUTPATIENT)
Dept: MRI IMAGING | Facility: HOSPITAL | Age: 82
Discharge: HOME | End: 2022-02-18
Admitting: PHYSICIAN ASSISTANT

## 2022-02-18 DIAGNOSIS — M54.41 RIGHT-SIDED LOW BACK PAIN WITH BILATERAL SCIATICA, UNSPECIFIED CHRONICITY: ICD-10-CM

## 2022-02-18 DIAGNOSIS — M54.42 RIGHT-SIDED LOW BACK PAIN WITH BILATERAL SCIATICA, UNSPECIFIED CHRONICITY: ICD-10-CM

## 2022-02-18 DIAGNOSIS — R93.7 ABNORMAL MRI, LUMBAR SPINE: Primary | ICD-10-CM

## 2022-02-18 PROCEDURE — 72148 MRI LUMBAR SPINE W/O DYE: CPT

## 2022-02-18 NOTE — PROGRESS NOTES
I have entered orders for the hub to schedule her with neurosurgery since I have gotten her MRI results back. Just wanted her to be aware they would be calling.

## 2022-02-21 ENCOUNTER — TELEPHONE (OUTPATIENT)
Dept: ORTHOPEDIC SURGERY | Facility: CLINIC | Age: 82
End: 2022-02-21

## 2022-02-21 NOTE — TELEPHONE ENCOUNTER
----- Message from Ajith Knowles PA-C sent at 2/18/2022  4:08 PM EST -----  I have entered orders for the hub to schedule her with neurosurgery since I have gotten her MRI results back. Just wanted her to be aware they would be calling.

## 2022-03-10 ENCOUNTER — OFFICE VISIT (OUTPATIENT)
Dept: ORTHOPEDIC SURGERY | Facility: CLINIC | Age: 82
End: 2022-03-10

## 2022-03-10 VITALS — WEIGHT: 180 LBS | BODY MASS INDEX: 30.73 KG/M2 | TEMPERATURE: 98.6 F | HEIGHT: 64 IN

## 2022-03-10 DIAGNOSIS — Z96.652 S/P TKR (TOTAL KNEE REPLACEMENT), LEFT: ICD-10-CM

## 2022-03-10 DIAGNOSIS — M54.42 RIGHT-SIDED LOW BACK PAIN WITH BILATERAL SCIATICA, UNSPECIFIED CHRONICITY: Primary | ICD-10-CM

## 2022-03-10 DIAGNOSIS — M54.41 RIGHT-SIDED LOW BACK PAIN WITH BILATERAL SCIATICA, UNSPECIFIED CHRONICITY: Primary | ICD-10-CM

## 2022-03-10 PROCEDURE — 99213 OFFICE O/P EST LOW 20 MIN: CPT | Performed by: ORTHOPAEDIC SURGERY

## 2022-03-10 PROCEDURE — 20610 DRAIN/INJ JOINT/BURSA W/O US: CPT | Performed by: ORTHOPAEDIC SURGERY

## 2022-03-10 RX ORDER — TRAMADOL HYDROCHLORIDE 50 MG/1
50 TABLET ORAL NIGHTLY PRN
Qty: 40 TABLET | Refills: 0 | Status: SHIPPED | OUTPATIENT
Start: 2022-03-10 | End: 2022-09-07

## 2022-03-10 RX ADMIN — METHYLPREDNISOLONE ACETATE 160 MG: 80 INJECTION, SUSPENSION INTRA-ARTICULAR; INTRALESIONAL; INTRAMUSCULAR; SOFT TISSUE at 09:01

## 2022-03-10 NOTE — PROGRESS NOTES
"Chief Complaint  Follow-up and Pain of the Left Knee and Follow-up and Pain of the Right Hip    Subjective    History of Present Illness      Violetta Starr is a 81 y.o. female who presents to [facility] for follow-up on left total knee arthroplasty and bilateral sacroiliac joint pain and discomfort.  History of Present Illness the patient has done well after left total knee arthroplasty performed by me on 19 October 2020.  Range of motion of the knee has improved considerably for the better with physical therapy.  She does not have any significant pain in the knee and does not need any narcotic medication to control her symptoms.  She has been having a lot of pain and discomfort in both the SI joints, right worse than left.  The pain radiates from the hip into the buttock and the thigh.  She has difficulty with turning over in bed at night.  Cross body activities bother the patient to quite a degree.  She denies any history of trauma to the lumbar spine.  Pain Location:  LEFT knee and RIGHT hip  Radiation: none  Quality: dull, aching  Intensity/Severity: mild-moderate  Duration: several years  Progression of symptoms: no worsening, symptoms stable/unchanged  Onset quality: gradual   Timing: intermittent  Aggravating Factors: walking, standing  Alleviating Factors: NSAIDs, steroid injection (intra-articular), rest  Previous Episodes: yes  Associated Symptoms: pain  ADLs Affected: ambulating, recreational activities/sports  Previous Treatment: NSAIDs, intra-articular injection and prior surgery       Objective   Vital Signs:   Temp 98.6 °F (37 °C)   Ht 162.6 cm (64\")   Wt 81.6 kg (180 lb)   BMI 30.90 kg/m²     Physical Exam  Physical Exam  Vitals signs and nursing note reviewed.   Constitutional:       Appearance: Normal appearance.   Pulmonary:      Effort: Pulmonary effort is normal.   Skin:     General: Skin is warm and dry.      Capillary Refill: Capillary refill takes less than " 2 seconds.   Neurological:      General: No focal deficit present.      Mental Status: He is alert and oriented to person, place, and time. Mental status is at baseline.   Psychiatric:         Mood and Affect: Mood normal.         Behavior: Behavior normal.         Thought Content: Thought content normal.         Judgment: Judgment normal.     Ortho Exam   Right SI JOINT: Mild tenderness is present dorsally over the SI joint. Figure of 4 sign is positive. There is mild spasm present in the erector spinae muscle. Flexion and extension are associated with discomfort. Deep tendon reflexes are intact and symmetrical on both lower extremities. Pain radiates into the buttock on extension of the hip. No evidence of cauda equina syndrome. No motor or sensory deficit is noted. There is no bowel or bladder involvement.    Left knee.The patient is status post total knee arthroplasty postoperative 1.5 year(s). Incision is clean. Calf is soft and nontender. Homans sign is negative. There is no clicking, popping or catching. Anterior and posterior drawer signs are negative.  There is no instability of the components. Appropriate amounts of swelling and bruising are noted. Dorsalis pedis and posterior tibial artery pulses are palpable. Common peroneal nerve function is well preserved. Range of motion is from 0-125 degrees of flexion. Gait is cautious but otherwise fairly normal. There is no evidence of a deep seated joint infection.      Result Review :   The following data was reviewed by: Charan Payne MD on 03/10/2022:  Radiologic studies - see below for interpretation  no diagnostic testing performed this visit            Large Joint Arthrocentesis: R hip joint  Date/Time: 3/10/2022 9:01 AM  Consent given by: patient  Site marked: site marked  Timeout: Immediately prior to procedure a time out was called to verify the correct patient, procedure, equipment, support staff and site/side marked as required   Supporting  Documentation  Indications: pain   Procedure Details  Location: hip - R hip joint  Preparation: Patient was prepped and draped in the usual sterile fashion  Needle size: 25 G  Approach: anteromedial  Medications administered: 160 mg methylPREDNISolone acetate 80 MG/ML; 2 mL lidocaine (cardiac)  Patient tolerance: patient tolerated the procedure well with no immediate complications                 Assessment   Assessment and Plan    Diagnoses and all orders for this visit:    1. Right-sided low back pain with bilateral sciatica, unspecified chronicity (Primary)  -     Large Joint Arthrocentesis: R hip joint  -     traMADol (ULTRAM) 50 MG tablet; Take 1 tablet by mouth At Night As Needed for Moderate Pain .  Dispense: 40 tablet; Refill: 0    2. S/P TKR (total knee replacement), left  -     traMADol (ULTRAM) 50 MG tablet; Take 1 tablet by mouth At Night As Needed for Moderate Pain .  Dispense: 40 tablet; Refill: 0    Other orders  -     Cancel: Large Joint Arthrocentesis: R greater trochanteric bursa          Follow Up   · Compression/brace to the knee when she is walking on uneven terrain.  · Injected patient's right SI joint with a steroid from a dorsal approach.  · Calcium and vitamin D for bone health.  · Tablet meloxicam 7.5 mg tab 1 p.o. nightly for pain swelling and discomfort.  · , GI and dental procedure prophylaxis with antibiotics to prevent metastatic infection of the knee arthroplasty implants.  · Rest, ice, compression, and elevation (RICE) therapy  · Stretching and strengthening exercises  · OTC Alternate Ibuprofen and Tylenol as needed  · Follow up in 3 month(s)  • Patient was given instructions and counseling regarding her condition or for health maintenance advice. Please see specific information pulled into the AVS if appropriate.     Charan Payne MD   Date of Encounter: 3/10/2022   Electronically signed by Charan Payne MD, 03/10/22, 9:01 AM EST.     EMR Dragon/Transcription disclaimer:  Much of  this encounter note is an electronic transcription/translation of spoken language to printed text. The electronic translation of spoken language may permit erroneous, or at times, nonsensical words or phrases to be inadvertently transcribed; Although I have reviewed the note for such errors, some may still exist.

## 2022-03-10 NOTE — PROGRESS NOTES
"Chief Complaint  Follow-up and Pain of the Left Knee and Follow-up and Pain of the Right Hip    Subjective    History of Present Illness {CC  Problem List  Visit Diagnosis   Encounters  Notes  Medications  Labs  Result Review Imaging  Media :23}     Violetta Starr is a 81 y.o. female who presents to [facility] for {Joint injection HPI As:26626}      Objective   Vital Signs:   Temp 98.6 °F (37 °C)   Ht 162.6 cm (64\")   Wt 81.6 kg (180 lb)   BMI 30.90 kg/m²     Physical Exam  81 y.o. female is awake, alert, oriented, in no acute distress and well developed, well nourished.  Ortho Exam   {[employer] body part:24556}  {Musculoskeletal Exams:52593}      Result Review :{ Labs  Result Review  Imaging  Med Tab  Media :23}   {The following data was reviewed by (Optional):75499}  {Data reviewed (Optional):52990:::1}   {Ambulatory Labs (Optional):37300}  {Diagnostics options AS:86595}  {StEBOOKAPLACE XR Template (Optional):05246::\"Findings: ***\"}      Procedures         Assessment   Assessment and Plan {CC Problem List  Visit Diagnosis  ROS  Review (Popup)  Health Maintenance  Quality  BestPractice  Medications  SmartSets  SnapShot Encounters  Media :23}   Problem List Items Addressed This Visit        Musculoskeletal and Injuries    S/P TKR (total knee replacement), left    Relevant Medications    traMADol (ULTRAM) 50 MG tablet    Right-sided low back pain with bilateral sciatica - Primary    Relevant Medications    traMADol (ULTRAM) 50 MG tablet    Other Relevant Orders    Large Joint Arthrocentesis: R hip joint        {Time Spent (Optional):32174}  Follow Up {Instructions Charge Capture  Follow-up Communications :23}  · Injected patient's {RIGHT/LEFT:19512} {Body Location for [facility]:72116} joint(s)with {Joint Injection Medication Choices:09948} from an {anteromedial, anterolateral:75828} approach   · Compression/brace   · Rest, ice, compression, and elevation (RICE) " therapy  · OTC {OTC pain:21325}  · Follow up in *** {WEEKS/MONTHS:21113}  • Patient was given instructions and counseling regarding her condition or for health maintenance advice. Please see specific information pulled into the AVS if appropriate.     Alejandra Leroy MA   Date of Encounter: 3/10/2022   Electronically signed by Merary Felipe MA, 03/10/22, 9:01 AM EST.     EMR Dragon/Transcription disclaimer:  Much of this encounter note is an electronic transcription/translation of spoken language to printed text. The electronic translation of spoken language may permit erroneous, or at times, nonsensical words or phrases to be inadvertently transcribed; Although I have reviewed the note for such errors, some may still exist.

## 2022-03-17 RX ORDER — METHYLPREDNISOLONE ACETATE 80 MG/ML
160 INJECTION, SUSPENSION INTRA-ARTICULAR; INTRALESIONAL; INTRAMUSCULAR; SOFT TISSUE
Status: COMPLETED | OUTPATIENT
Start: 2022-03-10 | End: 2022-03-10

## 2022-05-03 ENCOUNTER — PREP FOR SURGERY (OUTPATIENT)
Dept: OTHER | Facility: HOSPITAL | Age: 82
End: 2022-05-03

## 2022-05-03 ENCOUNTER — OFFICE VISIT (OUTPATIENT)
Dept: NEUROSURGERY | Facility: CLINIC | Age: 82
End: 2022-05-03

## 2022-05-03 VITALS
WEIGHT: 183.4 LBS | BODY MASS INDEX: 31.31 KG/M2 | HEIGHT: 64 IN | DIASTOLIC BLOOD PRESSURE: 77 MMHG | SYSTOLIC BLOOD PRESSURE: 122 MMHG

## 2022-05-03 DIAGNOSIS — G89.29 CHRONIC RIGHT-SIDED LOW BACK PAIN WITH BILATERAL SCIATICA: Primary | ICD-10-CM

## 2022-05-03 DIAGNOSIS — M54.42 CHRONIC RIGHT-SIDED LOW BACK PAIN WITH BILATERAL SCIATICA: Primary | ICD-10-CM

## 2022-05-03 DIAGNOSIS — M54.41 CHRONIC RIGHT-SIDED LOW BACK PAIN WITH BILATERAL SCIATICA: Primary | ICD-10-CM

## 2022-05-03 PROCEDURE — 99204 OFFICE O/P NEW MOD 45 MIN: CPT | Performed by: NEUROLOGICAL SURGERY

## 2022-05-03 RX ORDER — CEFAZOLIN SODIUM 2 G/100ML
2 INJECTION, SOLUTION INTRAVENOUS ONCE
Status: CANCELLED | OUTPATIENT
Start: 2022-05-03 | End: 2022-05-03

## 2022-05-03 NOTE — H&P (VIEW-ONLY)
"Chief Complaint  Back Pain    Subjective          Violetta Starr who is a 81 y.o. year old female who presents to [facility] for Evaluation of the Spine.     The patient complains of pain located in the lumbar spine.  Patients states the pain has been present for 6 months.  The pain came on gradually.  The pain scale level is 9.  The pain radiates to the right greater than left leg. The leg is greater than the back.  The pain is waxing/waning and described as burning and throbbing.  The pain is worse at no particular time of day. Patient states prolonged walking makes the pain worse.  Patient states laying on her stomach with a pillow under her stomach makes the pain better.    Associated Symptoms Include: Numbness and Tingling  Conservative Interventions Include: Steroid injections help for a couple of weeks, Voltaren gel and blue emu help, tramadol doesn't help as much as 2 tylenol, she tried PT first, but it was not helpful    Was this the result of an injury or accident?: No    History of Previous Spinal Surgery?: No    This patient  reports that she has never smoked. She has never used smokeless tobacco.    Review of Systems   Musculoskeletal: Positive for arthralgias, back pain and myalgias.   Neurological: Positive for numbness.        Objective   Vital Signs:   /77 (BP Location: Left arm, Patient Position: Sitting)   Ht 162.6 cm (64\")   Wt 83.2 kg (183 lb 6.4 oz)   BMI 31.48 kg/m²       Physical Exam  Cardiovascular:      Comments: No edema  Pulmonary:      Effort: Pulmonary effort is normal.   Musculoskeletal:      Comments: No notable pain with hip rotation   Neurological:      Mental Status: She is alert.      Sensory: No sensory deficit.      Motor: No weakness.      Deep Tendon Reflexes: Reflexes normal.          Result Review :   I personally reviewed the patient's MRI scan which shows stenosis at L4-5 with anterolisthesis.       Assessment and Plan  "   Diagnoses and all orders for this visit:    1. Chronic right-sided low back pain with bilateral sciatica (Primary)      She has failed PT. We discussed LESB vs surgery. She would like to proceed with a right L4-5 minimally invasive laminectomy.     Follow Up   No follow-ups on file.  Patient was given instructions and counseling regarding her condition or for health maintenance advice. Please see specific information pulled into the AVS if appropriate.

## 2022-05-03 NOTE — PROGRESS NOTES
"Chief Complaint  Back Pain    Subjective          Violetta Starr who is a 81 y.o. year old female who presents to [facility] for Evaluation of the Spine.     The patient complains of pain located in the lumbar spine.  Patients states the pain has been present for 6 months.  The pain came on gradually.  The pain scale level is 9.  The pain radiates to the right greater than left leg. The leg is greater than the back.  The pain is waxing/waning and described as burning and throbbing.  The pain is worse at no particular time of day. Patient states prolonged walking makes the pain worse.  Patient states laying on her stomach with a pillow under her stomach makes the pain better.    Associated Symptoms Include: Numbness and Tingling  Conservative Interventions Include: Steroid injections help for a couple of weeks, Voltaren gel and blue emu help, tramadol doesn't help as much as 2 tylenol, she tried PT first, but it was not helpful    Was this the result of an injury or accident?: No    History of Previous Spinal Surgery?: No    This patient  reports that she has never smoked. She has never used smokeless tobacco.    Review of Systems   Musculoskeletal: Positive for arthralgias, back pain and myalgias.   Neurological: Positive for numbness.        Objective   Vital Signs:   /77 (BP Location: Left arm, Patient Position: Sitting)   Ht 162.6 cm (64\")   Wt 83.2 kg (183 lb 6.4 oz)   BMI 31.48 kg/m²       Physical Exam  Cardiovascular:      Comments: No edema  Pulmonary:      Effort: Pulmonary effort is normal.   Musculoskeletal:      Comments: No notable pain with hip rotation   Neurological:      Mental Status: She is alert.      Sensory: No sensory deficit.      Motor: No weakness.      Deep Tendon Reflexes: Reflexes normal.          Result Review :   I personally reviewed the patient's MRI scan which shows stenosis at L4-5 with anterolisthesis.       Assessment and Plan  "   Diagnoses and all orders for this visit:    1. Chronic right-sided low back pain with bilateral sciatica (Primary)      She has failed PT. We discussed LESB vs surgery. She would like to proceed with a right L4-5 minimally invasive laminectomy.     Follow Up   No follow-ups on file.  Patient was given instructions and counseling regarding her condition or for health maintenance advice. Please see specific information pulled into the AVS if appropriate.

## 2022-05-17 NOTE — PRE-PROCEDURE INSTRUCTIONS
PRE-OP INSTRUCTIONS REVIEWED WITH PATIENT: FASTING/BATHING/ARRIVAL PROCEDURES.  INSTRUCTED TO TAKE A.M. DAY OF SURGERY: OMEPRAZOLE  UNDERSTANDING VERBALIZED.

## 2022-05-19 ENCOUNTER — ANESTHESIA EVENT (OUTPATIENT)
Dept: PERIOP | Facility: HOSPITAL | Age: 82
End: 2022-05-19

## 2022-05-20 ENCOUNTER — ANESTHESIA (OUTPATIENT)
Dept: PERIOP | Facility: HOSPITAL | Age: 82
End: 2022-05-20

## 2022-05-20 ENCOUNTER — HOSPITAL ENCOUNTER (OUTPATIENT)
Facility: HOSPITAL | Age: 82
Setting detail: HOSPITAL OUTPATIENT SURGERY
Discharge: HOME | End: 2022-05-20
Attending: NEUROLOGICAL SURGERY | Admitting: NEUROLOGICAL SURGERY

## 2022-05-20 ENCOUNTER — APPOINTMENT (OUTPATIENT)
Dept: GENERAL RADIOLOGY | Facility: HOSPITAL | Age: 82
End: 2022-05-20

## 2022-05-20 VITALS
BODY MASS INDEX: 30.01 KG/M2 | WEIGHT: 180.12 LBS | SYSTOLIC BLOOD PRESSURE: 152 MMHG | OXYGEN SATURATION: 93 % | TEMPERATURE: 97.4 F | HEIGHT: 65 IN | HEART RATE: 73 BPM | DIASTOLIC BLOOD PRESSURE: 86 MMHG | RESPIRATION RATE: 18 BRPM

## 2022-05-20 DIAGNOSIS — M54.41 CHRONIC RIGHT-SIDED LOW BACK PAIN WITH BILATERAL SCIATICA: ICD-10-CM

## 2022-05-20 DIAGNOSIS — G89.29 CHRONIC RIGHT-SIDED LOW BACK PAIN WITH BILATERAL SCIATICA: ICD-10-CM

## 2022-05-20 DIAGNOSIS — M54.42 CHRONIC RIGHT-SIDED LOW BACK PAIN WITH BILATERAL SCIATICA: ICD-10-CM

## 2022-05-20 DIAGNOSIS — M48.061 SPINAL STENOSIS OF LUMBAR REGION WITH RADICULOPATHY: Primary | ICD-10-CM

## 2022-05-20 DIAGNOSIS — M54.16 SPINAL STENOSIS OF LUMBAR REGION WITH RADICULOPATHY: Primary | ICD-10-CM

## 2022-05-20 PROBLEM — M54.50 LOW BACK PAIN: Status: RESOLVED | Noted: 2021-10-08 | Resolved: 2022-05-20

## 2022-05-20 PROCEDURE — 63047 LAM FACETEC & FORAMOT LUMBAR: CPT | Performed by: NEUROLOGICAL SURGERY

## 2022-05-20 PROCEDURE — 25010000002 METHYLPREDNISOLONE PER 40 MG: Performed by: NEUROLOGICAL SURGERY

## 2022-05-20 PROCEDURE — 63047 LAM FACETEC & FORAMOT LUMBAR: CPT | Performed by: SPECIALIST/TECHNOLOGIST, OTHER

## 2022-05-20 PROCEDURE — 76000 FLUOROSCOPY <1 HR PHYS/QHP: CPT

## 2022-05-20 PROCEDURE — 25010000002 CEFAZOLIN IN DEXTROSE 2-4 GM/100ML-% SOLUTION: Performed by: NEUROLOGICAL SURGERY

## 2022-05-20 PROCEDURE — 25010000002 DEXAMETHASONE PER 1 MG: Performed by: NURSE ANESTHETIST, CERTIFIED REGISTERED

## 2022-05-20 PROCEDURE — 25010000002 FENTANYL CITRATE (PF) 50 MCG/ML SOLUTION: Performed by: NURSE ANESTHETIST, CERTIFIED REGISTERED

## 2022-05-20 PROCEDURE — 25010000002 PROPOFOL 10 MG/ML EMULSION: Performed by: NURSE ANESTHETIST, CERTIFIED REGISTERED

## 2022-05-20 PROCEDURE — 25010000002 HYDROMORPHONE 1 MG/ML SOLUTION: Performed by: NURSE ANESTHETIST, CERTIFIED REGISTERED

## 2022-05-20 PROCEDURE — 25010000002 ONDANSETRON PER 1 MG: Performed by: NURSE ANESTHETIST, CERTIFIED REGISTERED

## 2022-05-20 DEVICE — HEMOST ABS SURGIFOAM SZ12/7 2X6 7MM: Type: IMPLANTABLE DEVICE | Site: SPINE LUMBAR | Status: FUNCTIONAL

## 2022-05-20 RX ORDER — GLYCOPYRROLATE 0.2 MG/ML
0.2 INJECTION INTRAMUSCULAR; INTRAVENOUS
Status: COMPLETED | OUTPATIENT
Start: 2022-05-20 | End: 2022-05-20

## 2022-05-20 RX ORDER — METHYLPREDNISOLONE ACETATE 40 MG/ML
INJECTION, SUSPENSION INTRA-ARTICULAR; INTRALESIONAL; INTRAMUSCULAR; SOFT TISSUE AS NEEDED
Status: DISCONTINUED | OUTPATIENT
Start: 2022-05-20 | End: 2022-05-20 | Stop reason: HOSPADM

## 2022-05-20 RX ORDER — MEPERIDINE HYDROCHLORIDE 25 MG/ML
12.5 INJECTION INTRAMUSCULAR; INTRAVENOUS; SUBCUTANEOUS
Status: DISCONTINUED | OUTPATIENT
Start: 2022-05-20 | End: 2022-05-20 | Stop reason: HOSPADM

## 2022-05-20 RX ORDER — PROMETHAZINE HYDROCHLORIDE 12.5 MG/1
25 TABLET ORAL ONCE AS NEEDED
Status: DISCONTINUED | OUTPATIENT
Start: 2022-05-20 | End: 2022-05-20 | Stop reason: HOSPADM

## 2022-05-20 RX ORDER — OXYCODONE HYDROCHLORIDE AND ACETAMINOPHEN 5; 325 MG/1; MG/1
1 TABLET ORAL EVERY 4 HOURS PRN
Qty: 25 TABLET | Refills: 0 | Status: SHIPPED | OUTPATIENT
Start: 2022-05-20 | End: 2022-09-07

## 2022-05-20 RX ORDER — ACETAMINOPHEN 500 MG
1000 TABLET ORAL EVERY 6 HOURS PRN
COMMUNITY

## 2022-05-20 RX ORDER — SODIUM CHLORIDE, SODIUM LACTATE, POTASSIUM CHLORIDE, CALCIUM CHLORIDE 600; 310; 30; 20 MG/100ML; MG/100ML; MG/100ML; MG/100ML
9 INJECTION, SOLUTION INTRAVENOUS CONTINUOUS PRN
Status: DISCONTINUED | OUTPATIENT
Start: 2022-05-20 | End: 2022-05-20 | Stop reason: HOSPADM

## 2022-05-20 RX ORDER — CEFAZOLIN SODIUM 2 G/100ML
2 INJECTION, SOLUTION INTRAVENOUS ONCE
Status: COMPLETED | OUTPATIENT
Start: 2022-05-20 | End: 2022-05-20

## 2022-05-20 RX ORDER — ROCURONIUM BROMIDE 10 MG/ML
INJECTION, SOLUTION INTRAVENOUS AS NEEDED
Status: DISCONTINUED | OUTPATIENT
Start: 2022-05-20 | End: 2022-05-20 | Stop reason: SURG

## 2022-05-20 RX ORDER — PROMETHAZINE HYDROCHLORIDE 25 MG/1
25 SUPPOSITORY RECTAL ONCE AS NEEDED
Status: DISCONTINUED | OUTPATIENT
Start: 2022-05-20 | End: 2022-05-20 | Stop reason: HOSPADM

## 2022-05-20 RX ORDER — PHENYLEPHRINE HCL IN 0.9% NACL 1 MG/10 ML
SYRINGE (ML) INTRAVENOUS AS NEEDED
Status: DISCONTINUED | OUTPATIENT
Start: 2022-05-20 | End: 2022-05-20 | Stop reason: SURG

## 2022-05-20 RX ORDER — MAGNESIUM HYDROXIDE 1200 MG/15ML
LIQUID ORAL AS NEEDED
Status: DISCONTINUED | OUTPATIENT
Start: 2022-05-20 | End: 2022-05-20 | Stop reason: HOSPADM

## 2022-05-20 RX ORDER — ONDANSETRON 2 MG/ML
INJECTION INTRAMUSCULAR; INTRAVENOUS AS NEEDED
Status: DISCONTINUED | OUTPATIENT
Start: 2022-05-20 | End: 2022-05-20 | Stop reason: SURG

## 2022-05-20 RX ORDER — LIDOCAINE HYDROCHLORIDE 20 MG/ML
INJECTION, SOLUTION EPIDURAL; INFILTRATION; INTRACAUDAL; PERINEURAL AS NEEDED
Status: DISCONTINUED | OUTPATIENT
Start: 2022-05-20 | End: 2022-05-20 | Stop reason: SURG

## 2022-05-20 RX ORDER — PROPOFOL 10 MG/ML
VIAL (ML) INTRAVENOUS AS NEEDED
Status: DISCONTINUED | OUTPATIENT
Start: 2022-05-20 | End: 2022-05-20 | Stop reason: SURG

## 2022-05-20 RX ORDER — ONDANSETRON 2 MG/ML
4 INJECTION INTRAMUSCULAR; INTRAVENOUS ONCE AS NEEDED
Status: DISCONTINUED | OUTPATIENT
Start: 2022-05-20 | End: 2022-05-20 | Stop reason: HOSPADM

## 2022-05-20 RX ORDER — ACETAMINOPHEN 500 MG
1000 TABLET ORAL ONCE
Status: DISCONTINUED | OUTPATIENT
Start: 2022-05-20 | End: 2022-05-20 | Stop reason: HOSPADM

## 2022-05-20 RX ORDER — FENTANYL CITRATE 50 UG/ML
INJECTION, SOLUTION INTRAMUSCULAR; INTRAVENOUS AS NEEDED
Status: DISCONTINUED | OUTPATIENT
Start: 2022-05-20 | End: 2022-05-20 | Stop reason: SURG

## 2022-05-20 RX ORDER — BUPIVACAINE HYDROCHLORIDE AND EPINEPHRINE 5; 5 MG/ML; UG/ML
INJECTION, SOLUTION EPIDURAL; INTRACAUDAL; PERINEURAL AS NEEDED
Status: DISCONTINUED | OUTPATIENT
Start: 2022-05-20 | End: 2022-05-20 | Stop reason: HOSPADM

## 2022-05-20 RX ORDER — OXYCODONE HYDROCHLORIDE 5 MG/1
5 TABLET ORAL
Status: DISCONTINUED | OUTPATIENT
Start: 2022-05-20 | End: 2022-05-20 | Stop reason: HOSPADM

## 2022-05-20 RX ORDER — DEXAMETHASONE SODIUM PHOSPHATE 4 MG/ML
INJECTION, SOLUTION INTRA-ARTICULAR; INTRALESIONAL; INTRAMUSCULAR; INTRAVENOUS; SOFT TISSUE AS NEEDED
Status: DISCONTINUED | OUTPATIENT
Start: 2022-05-20 | End: 2022-05-20 | Stop reason: SURG

## 2022-05-20 RX ORDER — KETAMINE HYDROCHLORIDE 50 MG/ML
INJECTION, SOLUTION, CONCENTRATE INTRAMUSCULAR; INTRAVENOUS AS NEEDED
Status: DISCONTINUED | OUTPATIENT
Start: 2022-05-20 | End: 2022-05-20 | Stop reason: SURG

## 2022-05-20 RX ADMIN — DEXAMETHASONE SODIUM PHOSPHATE 4 MG: 4 INJECTION, SOLUTION INTRA-ARTICULAR; INTRALESIONAL; INTRAMUSCULAR; INTRAVENOUS; SOFT TISSUE at 10:05

## 2022-05-20 RX ADMIN — HYDROMORPHONE HYDROCHLORIDE 0.5 MG: 1 INJECTION, SOLUTION INTRAMUSCULAR; INTRAVENOUS; SUBCUTANEOUS at 11:33

## 2022-05-20 RX ADMIN — SODIUM CHLORIDE, POTASSIUM CHLORIDE, SODIUM LACTATE AND CALCIUM CHLORIDE 9 ML/HR: 600; 310; 30; 20 INJECTION, SOLUTION INTRAVENOUS at 09:27

## 2022-05-20 RX ADMIN — GLYCOPYRROLATE 0.2 MG: 0.2 INJECTION INTRAMUSCULAR; INTRAVENOUS at 09:24

## 2022-05-20 RX ADMIN — FENTANYL CITRATE 50 MCG: 50 INJECTION, SOLUTION INTRAMUSCULAR; INTRAVENOUS at 10:04

## 2022-05-20 RX ADMIN — LIDOCAINE HYDROCHLORIDE 60 MG: 20 INJECTION, SOLUTION EPIDURAL; INFILTRATION; INTRACAUDAL; PERINEURAL at 09:55

## 2022-05-20 RX ADMIN — Medication 100 MCG: at 10:20

## 2022-05-20 RX ADMIN — KETAMINE HYDROCHLORIDE 25 MG: 50 INJECTION, SOLUTION INTRAMUSCULAR; INTRAVENOUS at 09:55

## 2022-05-20 RX ADMIN — ROCURONIUM BROMIDE 50 MG: 10 INJECTION INTRAVENOUS at 09:55

## 2022-05-20 RX ADMIN — HYDROMORPHONE HYDROCHLORIDE 0.5 MG: 1 INJECTION, SOLUTION INTRAMUSCULAR; INTRAVENOUS; SUBCUTANEOUS at 11:44

## 2022-05-20 RX ADMIN — ONDANSETRON 4 MG: 2 INJECTION INTRAMUSCULAR; INTRAVENOUS at 10:05

## 2022-05-20 RX ADMIN — SUGAMMADEX 200 MG: 100 INJECTION, SOLUTION INTRAVENOUS at 11:17

## 2022-05-20 RX ADMIN — OXYCODONE HYDROCHLORIDE 5 MG: 5 TABLET ORAL at 12:41

## 2022-05-20 RX ADMIN — FENTANYL CITRATE 50 MCG: 50 INJECTION, SOLUTION INTRAMUSCULAR; INTRAVENOUS at 09:55

## 2022-05-20 RX ADMIN — PROPOFOL 140 MG: 10 INJECTION, EMULSION INTRAVENOUS at 09:55

## 2022-05-20 RX ADMIN — CEFAZOLIN SODIUM 2 G: 2 INJECTION, SOLUTION INTRAVENOUS at 09:51

## 2022-05-20 RX ADMIN — Medication 100 MCG: at 10:37

## 2022-05-20 NOTE — ANESTHESIA PREPROCEDURE EVALUATION
Anesthesia Evaluation     Patient summary reviewed and Nursing notes reviewed   no history of anesthetic complications:  NPO Solid Status: > 8 hours  NPO Liquid Status: > 2 hours           Airway   Mallampati: I  TM distance: >3 FB  No difficulty expected  Dental          Pulmonary - negative pulmonary ROS and normal exam   Cardiovascular - normal exam  Exercise tolerance: good (4-7 METS)    ECG reviewed    (+) hypertension, dysrhythmias Bradycardia, PVC,       Neuro/Psych  (+) numbness, psychiatric history Anxiety,    GI/Hepatic/Renal/Endo    (+)  GERD,      Musculoskeletal     (+) back pain, radiculopathy Right lower extremity and Left lower extremity  Abdominal  - normal exam   Substance History - negative use     OB/GYN negative ob/gyn ROS         Other   arthritis,    history of cancer (Endometrial)    ROS/Med Hx Other:                    Anesthesia Plan    ASA 3     general   (Patient understands anesthesia not responsible for dental damage.)  intravenous induction     Anesthetic plan, all risks, benefits, and alternatives have been provided, discussed and informed consent has been obtained with: patient.    Plan discussed with CRNA.        CODE STATUS:

## 2022-05-20 NOTE — ANESTHESIA POSTPROCEDURE EVALUATION
Patient: Violetta Starr    Procedure Summary     Date: 05/20/22 Room / Location: [facility] OR  / [facility] MAIN OR    Anesthesia Start: 0951 Anesthesia Stop: 1127    Procedure: MINIMALLY INVASIVE LUMBAR LAMINECTOMY, right approach, lumbar 4-lumbar 5 (Right Back) Diagnosis:       Chronic right-sided low back pain with bilateral sciatica      (Chronic right-sided low back pain with bilateral sciatica [M54.41, M54.42, G89.29])    Surgeons: Carmine Mchugh MD Provider: Yumiko Carvajal DO    Anesthesia Type: general ASA Status: 3          Anesthesia Type: general    Vitals  Vitals Value Taken Time   /68 05/20/22 1158   Temp 36.2 °C (97.1 °F) 05/20/22 1127   Pulse 72 05/20/22 1159   Resp 16 05/20/22 1152   SpO2 97 % 05/20/22 1159   Vitals shown include unvalidated device data.        Post Anesthesia Care and Evaluation    Patient location during evaluation: bedside  Patient participation: complete - patient participated  Level of consciousness: awake  Pain score: 0  Pain management: adequate  Airway patency: patent  Anesthetic complications: No anesthetic complications  PONV Status: none  Cardiovascular status: acceptable and stable  Respiratory status: acceptable and room air  Hydration status: acceptable    Comments: An Anesthesiologist personally participated in the most demanding procedures (including induction and emergence if applicable) in the anesthesia plan, monitored the course of anesthesia administration at frequent intervals and remained physically present and available for immediate diagnosis and treatment of emergencies.

## 2022-05-20 NOTE — DISCHARGE INSTRUCTIONS
DISCHARGE INSTRUCTIONS  DISCECTOMY/ LAMINECTOMY  [] MINIMALLY INVASIVE      For your surgery you had:  General anesthesia (you may have a sore throat for the first 24 hours)  IV sedation.  Local anesthesia  Monitored anesthesia care  You received a medicated patch for nausea prevention today (behind your ear). It is recommended that you remove it 24-48 hours post-operatively. It must be removed within 72 hours.   You have received an anesthesia medication today that can cause hormonal forms of birth control to be ineffective. You should use a different form of birth control (to prevent pregnancy) for 7 days.   You may experience dizziness, drowsiness, or light-headedness for several hours following surgery  Do not stay alone today or tonight.  Limit your activity for 24 hours.  You should not drive, operate machinery, drink alcohol, or sign legally binding documents for 24 hours or while you are taking pain medication.  Activity  For the first two weeks following surgery, remain close to home and do not do any lifting, bending or strenuous activity.  Walking is the best exercise and you can increase the amount you walk as you feel like it.  Riding in a car for short distances (15-20 minutes) is okay but do not drive until you are off all narcotic medications.  If you have a sedentary job, you may resume work as soon as you feel like it (this is rarely less than one week).  Pain Control  Take your pain medicines as needed and as prescribed.  As you are feeling better, you can decrease the prescribed pain medication and take over-the-counter medications such as Tylenol or Ibuprofen.  The prescribed pain medicines tend to be constipating so it is important to eat a well-balanced diet and take a stool softener if recommended by the doctor.  Activity such as walking also helps keep your bowels regular.  Last dose of pain medication was given at:    Incision Care  Your sutures are under the skin and will dissolve in  time.  You may shower as soon as you like.    [] You have a clear dressing, which you should remove in 3-4 days.  Beneath this dressing are steri-strips that will peel off over time.  If these steri-strips have not peeled off in 10-14 days, you may remove them.  [] Your incision is closed with skin glue, it remains in place for 5-10 days and naturally sheds itself. By this time your wound should be healed.    Gently washing your incision with mild soap and rinsing with water during your shower is all you need to do to care for the incision.  Do not scrub the incision or sit in the bathtub.  Check your incision site each day to see how it looks.  Some redness and bruising is normal for the first few days.  NOTIFY YOUR DOCTOR IF YOU EXPERIENCE ANY OF THE FOLLOWING:   You have a fever over 100.8o Fahrenheit orally  Shaking chills  Your incision is red, hot to touch, or has excessive drainage  Your incision starts to separate  Increase in bleeding or bleeding that is excessive  Nausea, vomiting and/or pain not controlled by prescribed medications  Unable to urinate in 6 hours after surgery  You may contact 's clinic at [phone] with any questions or concerns.  If unable to reach your doctor, please go to the closest emergency room.

## 2022-05-20 NOTE — OP NOTE
LUMBAR LAMINECTOMY DISCECTOMY MINIMALLY INVASIVE  Procedure Report    Patient Name:  Violetta Starr  YOB: 1940    Date of Surgery:  5/20/2022     Indications: Lumbar 4-lumbar 5 stenosis with radiculopathy    Pre-op Diagnosis:   Chronic right-sided low back pain with bilateral sciatica [M54.41, M54.42, G89.29]       Post-Op Diagnosis Codes:     * Chronic right-sided low back pain with bilateral sciatica [M54.41, M54.42, G89.29]    Procedure/CPT® Codes:      Procedure(s):  MINIMALLY INVASIVE LUMBAR LAMINECTOMY, right approach, lumbar 4-lumbar 5    Staff:  Surgeon(s):  Carmine Mchugh MD    Assistant: Valerie Lacey RN CSA    Anesthesia: General    Estimated Blood Loss: 30 mL      Specimen:          Lamina and ligament (none to pathology)        Findings: Lateral recess stenosis with facet hypertrophy    Complications: No intraoperative complications    Description of Procedure: After informed consent was obtained, the patient was brought to the operating room. After the induction of adequate general endotracheal anesthesia, they were place in the prone position on the Holland frame. All pressure points were padded. The back was prepped and draped in the typical fashion. A timeout was performed. The midline was marked and a line roughly 2.5 cms off the midline to the right was drawn. The C-arm and a spinal needle were used to localize the L4-5 level. A 2 cm incision was then made and the Boss tubular retractor system was used to dilate the tissue docking at L4-5. The level was confirmed with fluoroscopy. The microscope was brought in and used for the remainder of the case for improved magnification and illumination. The lamina was cleared of soft tissue and removed to above the insertion of the ligamentum flavum. The ligament was resected inferiorly and laterally to the edge of the nerve root.  There was significant facet hypertrophy compressing the L5 nerve root.  After undercutting the medial  facet, a ball probe passed freely along the right L5 nerve root.  After decompressing this the tubular tractor was tilted medially and the ligament was removed with insertion L5.  The opposite lateral recess was under cut and decompressed using a 2 and 3 mm Kerrison punch after using the dissector to free the dura from the overlying ligament.  After this a ball probe passed freely along the patient's left L5 nerve root.  Hemostasis was then obtained using a combination of Gelfoam with thrombin and the bipolar electrocautery.  40 mg of Depo-Medrol placed over the spinal sac and the L5 nerve root. The wound was irrigated with normal saline.  The tubular retractor was removed and hemostasis assured in the soft tissue. The fascia was reapproximated using a 0 Vicryl and the the skin edges were reapproximated using a subcutaneous 2-0 Vicryl. The wound was dressed with mastisol, steri-strips, Telfa and Tegaderm. All sponge and needle counts were correct. The patient received a dose of preoperative antibiotics.     Assistant: Valerie Lacey RN CSA  was responsible for performing the following activities: Retraction, Suction, Closing and Placing Dressing and their skilled assistance was necessary for the success of this case.    Carmine Mchugh MD     Date: 5/20/2022  Time: 11:23 EDT

## 2022-05-27 ENCOUNTER — TELEPHONE (OUTPATIENT)
Dept: NEUROSURGERY | Facility: CLINIC | Age: 82
End: 2022-05-27

## 2022-05-27 DIAGNOSIS — I10 ESSENTIAL HYPERTENSION: ICD-10-CM

## 2022-05-27 RX ORDER — ATENOLOL 50 MG/1
50 TABLET ORAL DAILY
Qty: 90 TABLET | Refills: 0 | Status: SHIPPED | OUTPATIENT
Start: 2022-05-27 | End: 2022-06-16 | Stop reason: SDUPTHER

## 2022-05-27 RX ORDER — LORAZEPAM 0.5 MG/1
TABLET ORAL
Qty: 90 TABLET | Refills: 0 | OUTPATIENT
Start: 2022-05-27

## 2022-05-27 RX ORDER — LOSARTAN POTASSIUM 100 MG/1
100 TABLET ORAL DAILY
Qty: 90 TABLET | Refills: 0 | Status: SHIPPED | OUTPATIENT
Start: 2022-05-27 | End: 2022-06-16 | Stop reason: SDUPTHER

## 2022-05-27 NOTE — TELEPHONE ENCOUNTER
Pt called asking if it was okay to get steroid injection in her knee on 6/9/22.  Ortho wants the okay from you before they do it, due to recent Sx.

## 2022-06-09 ENCOUNTER — OFFICE VISIT (OUTPATIENT)
Dept: NEUROSURGERY | Facility: CLINIC | Age: 82
End: 2022-06-09

## 2022-06-09 ENCOUNTER — CLINICAL SUPPORT (OUTPATIENT)
Dept: ORTHOPEDIC SURGERY | Facility: CLINIC | Age: 82
End: 2022-06-09

## 2022-06-09 VITALS — BODY MASS INDEX: 29.99 KG/M2 | TEMPERATURE: 98.5 F | WEIGHT: 180 LBS | HEIGHT: 65 IN

## 2022-06-09 VITALS — BODY MASS INDEX: 29.99 KG/M2 | HEIGHT: 65 IN | WEIGHT: 180 LBS

## 2022-06-09 DIAGNOSIS — M48.061 SPINAL STENOSIS, LUMBAR REGION, WITHOUT NEUROGENIC CLAUDICATION: ICD-10-CM

## 2022-06-09 DIAGNOSIS — M25.561 CHRONIC PAIN OF RIGHT KNEE: Primary | ICD-10-CM

## 2022-06-09 DIAGNOSIS — G89.29 CHRONIC PAIN OF RIGHT KNEE: Primary | ICD-10-CM

## 2022-06-09 DIAGNOSIS — Z98.890 S/P LUMBAR LAMINECTOMY: Primary | ICD-10-CM

## 2022-06-09 PROCEDURE — 20610 DRAIN/INJ JOINT/BURSA W/O US: CPT | Performed by: ORTHOPAEDIC SURGERY

## 2022-06-09 PROCEDURE — 99024 POSTOP FOLLOW-UP VISIT: CPT | Performed by: NURSE PRACTITIONER

## 2022-06-09 PROCEDURE — 99214 OFFICE O/P EST MOD 30 MIN: CPT | Performed by: ORTHOPAEDIC SURGERY

## 2022-06-09 RX ADMIN — METHYLPREDNISOLONE ACETATE 160 MG: 80 INJECTION, SUSPENSION INTRA-ARTICULAR; INTRALESIONAL; INTRAMUSCULAR; SOFT TISSUE at 08:51

## 2022-06-09 RX ADMIN — LIDOCAINE HYDROCHLORIDE 2 ML: 10 INJECTION, SOLUTION INFILTRATION; PERINEURAL at 08:51

## 2022-06-09 NOTE — PROGRESS NOTES
"Chief Complaint  Post-op    Subjective          Violetta Starr who is a 81 y.o. year old female who presents to [facility] 3 weeks s/p right MIL L4/5.     Pt having mild aching pain in the lateral ankle. Denies radiating pain or back pain. She is also having some pain in the right knee, has a history of arthritis, received an injection in the knee today.     Incision is healing well. No warmth, erythema, or edema. No fever or chills.             Review of Systems   Musculoskeletal: Positive for gait problem and joint swelling.   All other systems reviewed and are negative.       Objective   Vital Signs:   Ht 165.1 cm (65\")   Wt 81.6 kg (180 lb)   BMI 29.95 kg/m²       Physical Exam  Vitals reviewed.   Constitutional:       Appearance: Normal appearance.   Skin:         Neurological:      Mental Status: She is alert and oriented to person, place, and time.      Deep Tendon Reflexes: Strength normal.        Neurologic Exam     Mental Status   Oriented to person, place, and time.   Level of consciousness: alert    Motor Exam   Muscle bulk: normal  Overall muscle tone: normal    Strength   Strength 5/5 throughout.     Gait, Coordination, and Reflexes Antalgic with a cane, secondary to knee pain        Result Review :                 Assessment and Plan    Diagnoses and all orders for this visit:    1. S/P lumbar laminectomy (Primary)    2. 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 Mobility restrictions x3 more weeks. Slowly increase activity as tolerated. She will follow up on an as needed basis.       Follow Up   Return if symptoms worsen or fail to improve.  Patient was given instructions and counseling regarding her condition or for health maintenance advice.     -No lifting greater than 10 pounds, limit bending and twisting at the waist x3 more weeks  -Slowly increase activity as tolerated  -Follow up as " needed

## 2022-06-09 NOTE — PROGRESS NOTES
"Chief Complaint  Follow-up and Pain of the Right Knee    Subjective    History of Present Illness {CC  Problem List  Visit Diagnosis   Encounters  Notes  Medications  Labs  Result Review Imaging  Media :23}     Violetta Starr is a 81 y.o. female who presents to [facility] for {Joint injection HPI As:78563}      Objective   Vital Signs:   Temp 98.5 °F (36.9 °C)   Ht 165.1 cm (65\")   Wt 81.6 kg (180 lb)   BMI 29.95 kg/m²     Physical Exam  81 y.o. female is awake, alert, oriented, in no acute distress and well developed, well nourished.  Ortho Exam   {[employer] body part:36970}  {Musculoskeletal Exams:20395}      Result Review :{ Labs  Result Review  Imaging  Med Tab  Media :23}   {The following data was reviewed by (Optional):30697}  {Data reviewed (Optional):99002:::1}   {Ambulatory Labs (Optional):59377}  {Diagnostics options AS:30832}  {StForgame XR Template (Optional):94464::\"Findings: ***\"}      Large Joint Arthrocentesis: R knee  Date/Time: 6/9/2022 8:35 AM  Consent given by: patient  Site marked: site marked  Timeout: Immediately prior to procedure a time out was called to verify the correct patient, procedure, equipment, support staff and site/side marked as required   Supporting Documentation  Indications: pain   Procedure Details  Location: knee - R knee  Preparation: Patient was prepped and draped in the usual sterile fashion  Needle size: 25 G  Approach: anteromedial  Medications administered: 160 mg methylPREDNISolone acetate 80 MG/ML  Patient tolerance: patient tolerated the procedure well with no immediate complications               Assessment   Assessment and Plan {CC Problem List  Visit Diagnosis  ROS  Review (Popup)  Health Maintenance  Quality  BestPractice  Medications  SmartSets  SnapShot Encounters  Media :23}   Problem List Items Addressed This Visit    None       {Time Spent (Optional):97146}  Follow Up {Instructions Charge Capture  " Follow-up Communications :23}  · Injected patient's {RIGHT/LEFT:20165} {Body Location for [facility]TH[facility]:32116} joint(s)with {Joint Injection Medication Choices:48797} from an {anteromedial, anterolateral:56376} approach   · Compression/brace   · Rest, ice, compression, and elevation (RICE) therapy  · OTC {OTC pain:26358}  · Follow up in *** {WEEKS/MONTHS:36392}  • Patient was given instructions and counseling regarding her condition or for health maintenance advice. Please see specific information pulled into the AVS if appropriate.     Merary Felipe MA   Date of Encounter: 6/9/2022   Electronically signed by Merary Felipe MA, 06/09/22, 8:35 AM EDT.     EMR Dragon/Transcription disclaimer:  Much of this encounter note is an electronic transcription/translation of spoken language to printed text. The electronic translation of spoken language may permit erroneous, or at times, nonsensical words or phrases to be inadvertently transcribed; Although I have reviewed the note for such errors, some may still exist.

## 2022-06-09 NOTE — PROGRESS NOTES
"Chief Complaint  Follow-up and Pain of the Right Knee    Subjective    History of Present Illness      Violetta Starr is a 81 y.o. female who presents to [facility] for follow-up on left knee arthroplasty and right knee pain.  History of Present Illness     The patient is following up on her left total knee arthroplasty. This surgery was performed 3 years ago. She has done well after the knee replacement surgery. She is not taking any pain medication for the knee symptoms at this point. She does have right knee pain and discomfort. She has difficulty with going up and down the steps, and difficulty with squatting on the ground. She states that she would like to defer knee replacement surgery as long as possible and I certainly agree with that. She states that she would consider right knee replacement surgery after Labor Day this year.    Pain Location:  Medial aspect of the right  knee  Radiation: none  Quality: sharp, stabbing  Intensity/Severity: Moderately severe  Duration: Several months  Progression of symptoms: Yes  Onset quality: gradual   Timing: intermittent  Aggravating Factors: Deep flexion of the knee  Alleviating Factors: Anti-inflammatory medication  Previous Episodes: yes  Associated Symptoms: pain, swelling, discomfort  ADLs Affected: Yes  Previous Treatment: Anti-inflammatory medication and intra-articular knee injections       Objective   Vital Signs:   Temp 98.5 °F (36.9 °C)   Ht 165.1 cm (65\")   Wt 81.6 kg (180 lb)   BMI 29.95 kg/m²     Physical Exam  Physical Exam  Vitals signs and nursing note reviewed.   Constitutional:       Appearance: Normal appearance.   Pulmonary:      Effort: Pulmonary effort is normal.   Skin:     General: Skin is warm and dry.      Capillary Refill: Capillary refill takes less than 2 seconds.   Neurological:      General: No focal deficit present.      Mental Status: He is alert and oriented to person, place, and time. Mental status is " at baseline.   Psychiatric:         Mood and Affect: Mood normal.         Behavior: Behavior normal.         Thought Content: Thought content normal.         Judgment: Judgment normal.     Ortho Exam   Right knee (varus). Patient has crepitus throughout range of motion. Positive patellar grind test. Mild effusion. Lachman is negative. Pivot shift is negative. Anterior and posterior drawer signs are negative. Significant joint line tenderness is noted on the medial aspect of the knee. Patient has a varus orientation of the knee. There is fullness and tenderness in the Popliteal fossa. Mild distention of a Popliteal cyst is noted in this location. Range of motion in flexion is from 0-110 degrees. Neurovascular status is intact.  Dorsalis pedis and posterior tibial artery pulses are palpable. Common peroneal nerve function is well preserved. Patient's gait is cautious and antalgic. Skin and soft tissues are mildly swollen, consistent with synovitis and effusion. The patient has a significant limp with the first few steps after starting the gait cycle. Getting out of a chair takes a lot of effort due to pain on knee flexion.     Left knee.The patient is status post total knee arthroplasty postoperative 3 year(s). Incision is clean. Calf is soft and nontender. Homans sign is negative. There is no clicking, popping or catching. Anterior and posterior drawer signs are negative.  There is no instability of the components. Appropriate amounts of swelling and bruising are noted. Dorsalis pedis and posterior tibial artery pulses are palpable. Common peroneal nerve function is well preserved. Range of motion is from 0-120 degrees of flexion. Gait is cautious but otherwise fairly normal. There is no evidence of a deep seated joint infection.          Result Review :   The following data was reviewed by: Charan Payne MD on 06/09/2022:                Large Joint Arthrocentesis: R knee  Date/Time: 6/9/2022 8:51 AM  Consent given  by: patient  Site marked: site marked  Timeout: Immediately prior to procedure a time out was called to verify the correct patient, procedure, equipment, support staff and site/side marked as required   Supporting Documentation  Indications: pain   Procedure Details  Location: knee - R knee  Preparation: Patient was prepped and draped in the usual sterile fashion  Needle size: 25 G  Approach: anteromedial  Medications administered: 2 mL lidocaine 1 %; 160 mg methylPREDNISolone acetate 80 MG/ML  Patient tolerance: patient tolerated the procedure well with no immediate complications                 Assessment   Assessment and Plan    Diagnoses and all orders for this visit:    1. Chronic pain of right knee (Primary)  -     XR Knee 1 or 2 View Right; Future  -     Large Joint Arthrocentesis: R knee    Other orders  -     Cancel: Large Joint Arthrocentesis: R knee          Follow Up   · Compression/brace  · Rest, ice, compression, and elevation (RICE) therapy  · Stretching and strengthening exercises  · OTC Tylenol 500-1000mg by mouth every 6 hours as needed for pain   · Follow up in 3 month(s)  • Patient was given instructions and counseling regarding her condition or for health maintenance advice. Please see specific information pulled into the AVS if appropriate.   The patient was seen today for preoperative discussion.  The patient has been tried on over-the-counter and prescription NSAID's despite the risks of anti-inflammatory bleeding, peptic ulcers and erosive gastritis with short term benefit only.  Braces have been prescribed for mechanical support.  Patient has been participating in an exercise program specifically targeting joint pain relief with limited benefit. Intraarticular injections have been used periodically with some but not complete relief of pain.  Ambulation aids have also been utilized.      • The details of the surgical procedure were explained including the location of probable incisions and a  description of the likely hardware/grafts to be used. The patient understands the likely convalescence after surgery as well as the rehabilitation required.  Also, we have thoroughly discussed with the patient the risks, benefits and alternatives to surgery.  Risks include but are not limited to the risk of infection, joint stiffness, limited range of motion, wound healing problems, scar tissue build up, myocardial infarction, stroke, blood clots (including DVT and/or pulmonary embolus along with the risk of death) neurologic and/or vascular injury, limb length discrepancy, fracture, dislocation, nonunion, malunion, continued pain and need for further surgery including hardware failure requiring revision.     · Brace to the knee to prevent it from buckling and giving out.  · Injected the patient's right knee with a steroid from an anteromedial approach.  · Calcium and vitamin D for bone health.  · Risks and benefits of total knee arthroplasty were discussed with the patient for Labor Day this year.  · Tablet meloxicam 7.5 mg tablet 1 PO QHS for pain, swelling, and discomfort.    Charan Payne MD   Date of Encounter: 6/9/2022   Electronically signed by Charan Payne MD, 06/09/22, 8:50 AM EDT.     EMR Dragon/Transcription disclaimer:  Much of this encounter note is an electronic transcription/translation of spoken language to printed text. The electronic translation of spoken language may permit erroneous, or at times, nonsensical words or phrases to be inadvertently transcribed; Although I have reviewed the note for such errors, some may still exist.     Transcribed from ambient dictation for Charan Payne MD by MALIK SANDERS.  06/22/22   14:09 EDT    Patient verbalized consent to the visit recording.

## 2022-06-09 NOTE — PATIENT INSTRUCTIONS
-No lifting greater than 10 pounds, limit bending and twisting at the waist x3 more weeks  -Slowly increase activity as tolerated  -Follow up as needed

## 2022-06-16 ENCOUNTER — LAB (OUTPATIENT)
Dept: LAB | Facility: HOSPITAL | Age: 82
End: 2022-06-16

## 2022-06-16 ENCOUNTER — OFFICE VISIT (OUTPATIENT)
Dept: FAMILY MEDICINE CLINIC | Age: 82
End: 2022-06-16

## 2022-06-16 VITALS
HEART RATE: 68 BPM | HEIGHT: 65 IN | BODY MASS INDEX: 29.39 KG/M2 | WEIGHT: 176.4 LBS | DIASTOLIC BLOOD PRESSURE: 65 MMHG | TEMPERATURE: 98.3 F | SYSTOLIC BLOOD PRESSURE: 104 MMHG

## 2022-06-16 DIAGNOSIS — K21.9 GASTROESOPHAGEAL REFLUX DISEASE WITHOUT ESOPHAGITIS: ICD-10-CM

## 2022-06-16 DIAGNOSIS — M17.11 PRIMARY OSTEOARTHRITIS OF RIGHT KNEE: ICD-10-CM

## 2022-06-16 DIAGNOSIS — I10 ESSENTIAL HYPERTENSION: ICD-10-CM

## 2022-06-16 DIAGNOSIS — Z00.00 PHYSICAL EXAM: Primary | ICD-10-CM

## 2022-06-16 DIAGNOSIS — Z79.899 OTHER LONG TERM (CURRENT) DRUG THERAPY: ICD-10-CM

## 2022-06-16 DIAGNOSIS — Z78.0 ASYMPTOMATIC POSTMENOPAUSAL STATE: ICD-10-CM

## 2022-06-16 DIAGNOSIS — N18.32 CHRONIC KIDNEY DISEASE, STAGE 3B: ICD-10-CM

## 2022-06-16 DIAGNOSIS — R73.9 HYPERGLYCEMIA: ICD-10-CM

## 2022-06-16 DIAGNOSIS — F41.9 ANXIETY: ICD-10-CM

## 2022-06-16 DIAGNOSIS — Z12.31 BREAST CANCER SCREENING BY MAMMOGRAM: ICD-10-CM

## 2022-06-16 LAB
ALBUMIN SERPL-MCNC: 4.5 G/DL (ref 3.5–5.2)
ALBUMIN/GLOB SERPL: 1.4 G/DL
ALP SERPL-CCNC: 48 U/L (ref 39–117)
ALT SERPL W P-5'-P-CCNC: 14 U/L (ref 1–33)
ANION GAP SERPL CALCULATED.3IONS-SCNC: 15 MMOL/L (ref 5–15)
AST SERPL-CCNC: 20 U/L (ref 1–32)
BACTERIA UR QL AUTO: ABNORMAL /HPF
BILIRUB SERPL-MCNC: 0.4 MG/DL (ref 0–1.2)
BILIRUB UR QL STRIP: ABNORMAL
BUN SERPL-MCNC: 22 MG/DL (ref 8–23)
BUN/CREAT SERPL: 19.3 (ref 7–25)
CALCIUM SPEC-SCNC: 9.8 MG/DL (ref 8.6–10.5)
CHLORIDE SERPL-SCNC: 102 MMOL/L (ref 98–107)
CLARITY UR: ABNORMAL
CO2 SERPL-SCNC: 24 MMOL/L (ref 22–29)
COLOR UR: ABNORMAL
CREAT SERPL-MCNC: 1.14 MG/DL (ref 0.57–1)
DEPRECATED RDW RBC AUTO: 44.6 FL (ref 37–54)
EGFRCR SERPLBLD CKD-EPI 2021: 47.9 ML/MIN/1.73
ERYTHROCYTE [DISTWIDTH] IN BLOOD BY AUTOMATED COUNT: 12.5 % (ref 12.3–15.4)
GLOBULIN UR ELPH-MCNC: 3.2 GM/DL
GLUCOSE SERPL-MCNC: 118 MG/DL (ref 65–99)
GLUCOSE UR STRIP-MCNC: NEGATIVE MG/DL
HCT VFR BLD AUTO: 37.5 % (ref 34–46.6)
HGB BLD-MCNC: 12 G/DL (ref 12–15.9)
HGB UR QL STRIP.AUTO: ABNORMAL
HYALINE CASTS UR QL AUTO: ABNORMAL /LPF
KETONES UR QL STRIP: NEGATIVE
LEUKOCYTE ESTERASE UR QL STRIP.AUTO: ABNORMAL
MCH RBC QN AUTO: 30.7 PG (ref 26.6–33)
MCHC RBC AUTO-ENTMCNC: 32 G/DL (ref 31.5–35.7)
MCV RBC AUTO: 95.9 FL (ref 79–97)
MUCOUS THREADS URNS QL MICRO: ABNORMAL /HPF
NITRITE UR QL STRIP: NEGATIVE
PH UR STRIP.AUTO: <=5 [PH] (ref 5–8)
PLATELET # BLD AUTO: 234 10*3/MM3 (ref 140–450)
PMV BLD AUTO: 10.3 FL (ref 6–12)
POTASSIUM SERPL-SCNC: 3.8 MMOL/L (ref 3.5–5.2)
PROT SERPL-MCNC: 7.7 G/DL (ref 6–8.5)
PROT UR QL STRIP: ABNORMAL
RBC # BLD AUTO: 3.91 10*6/MM3 (ref 3.77–5.28)
RBC # UR STRIP: ABNORMAL /HPF
REF LAB TEST METHOD: ABNORMAL
SODIUM SERPL-SCNC: 141 MMOL/L (ref 136–145)
SP GR UR STRIP: >=1.03 (ref 1–1.03)
SQUAMOUS #/AREA URNS HPF: ABNORMAL /HPF
TSH SERPL DL<=0.05 MIU/L-ACNC: 1.88 UIU/ML (ref 0.27–4.2)
UROBILINOGEN UR QL STRIP: ABNORMAL
WBC # UR STRIP: ABNORMAL /HPF
WBC NRBC COR # BLD: 6.5 10*3/MM3 (ref 3.4–10.8)

## 2022-06-16 PROCEDURE — 1126F AMNT PAIN NOTED NONE PRSNT: CPT | Performed by: FAMILY MEDICINE

## 2022-06-16 PROCEDURE — 36415 COLL VENOUS BLD VENIPUNCTURE: CPT

## 2022-06-16 PROCEDURE — 80053 COMPREHEN METABOLIC PANEL: CPT

## 2022-06-16 PROCEDURE — 1159F MED LIST DOCD IN RCRD: CPT | Performed by: FAMILY MEDICINE

## 2022-06-16 PROCEDURE — 1170F FXNL STATUS ASSESSED: CPT | Performed by: FAMILY MEDICINE

## 2022-06-16 PROCEDURE — 96160 PT-FOCUSED HLTH RISK ASSMT: CPT | Performed by: FAMILY MEDICINE

## 2022-06-16 PROCEDURE — 84443 ASSAY THYROID STIM HORMONE: CPT

## 2022-06-16 PROCEDURE — 83036 HEMOGLOBIN GLYCOSYLATED A1C: CPT | Performed by: FAMILY MEDICINE

## 2022-06-16 PROCEDURE — 81001 URINALYSIS AUTO W/SCOPE: CPT

## 2022-06-16 PROCEDURE — G0439 PPPS, SUBSEQ VISIT: HCPCS | Performed by: FAMILY MEDICINE

## 2022-06-16 PROCEDURE — 85027 COMPLETE CBC AUTOMATED: CPT

## 2022-06-16 RX ORDER — LORAZEPAM 0.5 MG/1
0.5 TABLET ORAL 2 TIMES DAILY PRN
Qty: 30 TABLET | Refills: 0 | Status: SHIPPED | OUTPATIENT
Start: 2022-06-16 | End: 2022-10-03

## 2022-06-16 RX ORDER — LOSARTAN POTASSIUM 100 MG/1
100 TABLET ORAL NIGHTLY
Qty: 90 TABLET | Refills: 3 | Status: SHIPPED | OUTPATIENT
Start: 2022-06-16 | End: 2022-09-07

## 2022-06-16 RX ORDER — OMEPRAZOLE 40 MG/1
40 CAPSULE, DELAYED RELEASE ORAL DAILY
Qty: 90 CAPSULE | Refills: 3 | Status: SHIPPED | OUTPATIENT
Start: 2022-06-16

## 2022-06-16 RX ORDER — ATENOLOL 50 MG/1
50 TABLET ORAL NIGHTLY
Qty: 90 TABLET | Refills: 3 | Status: SHIPPED | OUTPATIENT
Start: 2022-06-16 | End: 2023-02-17 | Stop reason: SDUPTHER

## 2022-06-16 RX ORDER — AMLODIPINE BESYLATE 10 MG/1
10 TABLET ORAL NIGHTLY
Qty: 90 TABLET | Refills: 3 | Status: SHIPPED | OUTPATIENT
Start: 2022-06-16 | End: 2023-02-17 | Stop reason: SDUPTHER

## 2022-06-16 NOTE — PROGRESS NOTES
The ABCs of the Annual Wellness Visit  Subsequent Medicare Wellness Visit    Chief Complaint:  Medicare wellness exam    Subjective    History of Present Illness:  Violetta Starr is a 81 y.o. female who presents for a Subsequent Medicare Wellness Visit.    The following portions of the patient's history were reviewed and updated as appropriate: allergies, current medications, past family history, past medical history, past social history, past surgical history and problem list.     Compared to one year ago, the patient feels her physical health is the same.    Compared to one year ago, the patient feels her mental health is better.  She was struggling with pain last year.    Recent Hospitalizations:  She was not admitted to the hospital during the last year.       Current Medical Providers:  Patient Care Team:  Antoni Gonzalez MD as PCP - General (Family Medicine)    Outpatient Medications Prior to Visit   Medication Sig Dispense Refill   • acetaminophen (TYLENOL) 500 MG tablet Take 1,000 mg by mouth Every 6 (Six) Hours As Needed for Mild Pain .     • hydrALAZINE (APRESOLINE) 10 MG tablet Take 1 tablet by mouth twice a day as needed for systolic blood pressure greater than 160. 90 tablet 3   • NAPROXEN DR PO Take 500 mg by mouth As Needed. INST PER ANESTHESIA PROTOCOL     • oxyCODONE-acetaminophen (PERCOCET) 5-325 MG per tablet Take 1 tablet by mouth Every 4 (Four) Hours As Needed (Pain) for up to 25 doses. 25 tablet 0   • traMADol (ULTRAM) 50 MG tablet Take 1 tablet by mouth At Night As Needed for Moderate Pain . 40 tablet 0   • amLODIPine (NORVASC) 10 MG tablet Take 1 tablet by mouth Daily. (Patient taking differently: Take 10 mg by mouth Every Night.) 90 tablet 3   • atenolol (TENORMIN) 50 MG tablet Take 1 tablet by mouth Daily. 90 tablet 0   • LORazepam (ATIVAN) 0.5 MG tablet Take 0.5 mg by mouth Every 8 (Eight) Hours As Needed for Anxiety.     • losartan (COZAAR) 100 MG tablet Take 1 tablet by mouth  Daily. 90 tablet 0   • omeprazole (priLOSEC) 40 MG capsule Take 1 capsule by mouth Daily. (Patient taking differently: Take 40 mg by mouth Every Morning.) 90 capsule 1     No facility-administered medications prior to visit.       Opioid medication/s are on active medication list.  and I have evaluated her active treatment plan and pain score trends (see table).  Vitals:    06/16/22 0853   PainSc: 0-No pain     I have reviewed the chart for potential of high risk medication and harmful drug interactions in the elderly.            Aspirin is not on active medication list.  Aspirin use is not indicated based on review of current medical condition/s. Risk of harm outweighs potential benefits.  .    Patient Active Problem List   Diagnosis   • Bilateral chronic knee pain   • Primary osteoarthritis of right knee   • Primary osteoarthritis of left knee   • History of malignant neoplasm of endometrium   • Essential hypertension   • Endometrial cancer (HCC)   • Anxiety   • S/P TKR (total knee replacement), left   • GERD (gastroesophageal reflux disease)   • Soft tissue swelling   • Hip pain   • Right-sided low back pain with bilateral sciatica     Advance Care Planning   Advance Directive is on file.  ACP discussion was held with the patient during this visit. Patient has an advance directive in EMR which is still valid. Her , [place], would make decisions if needed.  Her daughter is also listed.    In addition to the above, Violetta does have an intermittent issue with anxiety for which she takes lorazepam very sparingly.  She is not aware of obvious side effects related to this.  She denies abuse, diversion, or doctor shopping.  Christiano is reviewed.      She also has previously been noted to have a mild elevation of her renal functions.  She does have some difficulty controlling her bladder, but she denies other significant urinary or kidney problems.  It has been sometime since she had blood work done.  She does need active  "management of this in the sense of ordering labs.    Also, she has hypertension and remains on multiple treatments.  We have had some previous difficulty controlling her pressure, but most recently her pressures have been in a good range.  She did change the timing of her medications to nighttime, and that has had a significant impact.      Review of Systems:  Review of Systems   Constitutional: Negative for chills and fever.   Respiratory: Negative for cough and shortness of breath.    Cardiovascular: Negative for chest pain and palpitations.   Gastrointestinal: Negative for abdominal pain, nausea and vomiting.         Objective       Vitals:    06/16/22 0853   BP: 104/65   BP Location: Right arm   Patient Position: Sitting   Pulse: 68   Temp: 98.3 °F (36.8 °C)   TempSrc: Oral   Weight: 80 kg (176 lb 6.4 oz)   Height: 165.1 cm (65\")   PainSc: 0-No pain     BMI Readings from Last 1 Encounters:   06/16/22 29.35 kg/m²   BMI is above normal parameters. Recommendations include: exercise counseling and nutrition counseling    Does the patient have evidence of cognitive impairment? No    Physical Exam  Vitals and nursing note reviewed.   Constitutional:       General: She is not in acute distress.     Appearance: She is not ill-appearing.   HENT:      Right Ear: Tympanic membrane and ear canal normal.      Left Ear: Tympanic membrane and ear canal normal.      Ears:      Comments: Hearing is normal with forced whisper.     Mouth/Throat:      Mouth: Mucous membranes are moist.      Comments: Pharynx appears normal  Eyes:      Extraocular Movements: Extraocular movements intact.      Pupils: Pupils are equal, round, and reactive to light.      Comments: Binocular vision is 20/20 with correction.   Neck:      Thyroid: No thyromegaly.   Cardiovascular:      Rate and Rhythm: Normal rate and regular rhythm.      Heart sounds: No murmur heard.  Pulmonary:      Effort: Pulmonary effort is normal.      Breath sounds: Normal breath " sounds.   Abdominal:      General: There is no distension.      Palpations: Abdomen is soft. There is no mass.      Tenderness: There is no abdominal tenderness.   Musculoskeletal:      Cervical back: Normal range of motion.   Skin:     Findings: No lesion or rash.   Neurological:      General: No focal deficit present.      Mental Status: She is oriented to person, place, and time.      Cranial Nerves: No cranial nerve deficit.   Psychiatric:         Mood and Affect: Mood normal.       The following data was reviewed by Antoni Gonzalez MD on 06/16/2022.  Lab Results   Component Value Date    WBC 6.20 08/14/2020    HGB 12.50 08/14/2020    HCT 37.2 08/14/2020    MCV 91.9 08/14/2020    .00 08/14/2020     Lab Results   Component Value Date    GLUCOSE 93 03/15/2021    BUN 23 03/15/2021    CREATININE 1.16 (H) 03/15/2021     03/15/2021    K 4.0 03/15/2021     03/15/2021    CO2 26 03/15/2021    CALCIUM 9.8 03/15/2021    PROTEINTOT 7.2 03/15/2021    ALBUMIN 4.4 03/15/2021    ALT 10 03/15/2021    AST 20 03/15/2021    ALKPHOS 65 03/15/2021    BILITOT 0.26 03/15/2021    GLOB 2.8 03/15/2021    BCR 20 03/15/2021    ANIONGAP 14 03/15/2021      No results found for: CHOL, CHLPL, TRIG, HDL, LDL, LDLDIRECT  No results found for: TSH  No results found for: HGBA1C       HEALTH RISK ASSESSMENT    Smoking Status:  Social History     Tobacco Use   Smoking Status Never Smoker   Smokeless Tobacco Never Used     Alcohol Consumption:  Social History     Substance and Sexual Activity   Alcohol Use Never     Fall Risk Screen:    STEADI Fall Risk Assessment has not been completed.    Depression Screening:  PHQ-2/PHQ-9 Depression Screening 6/16/2022   Retired PHQ-9 Total Score -   Retired Total Score -   Little Interest or Pleasure in Doing Things 0-->not at all   Feeling Down, Depressed or Hopeless 0-->not at all   PHQ-9: Brief Depression Severity Measure Score 0       Health Habits and Functional and Cognitive  Screening:  Functional & Cognitive Status 6/16/2022   Do you have difficulty preparing food and eating? No   Do you have difficulty bathing yourself, getting dressed or grooming yourself? No   Do you have difficulty using the toilet? No   Do you have difficulty moving around from place to place? No   Do you have trouble with steps or getting out of a bed or a chair? No   Current Diet Well Balanced Diet   Dental Exam Up to date   Eye Exam Up to date   Exercise (times per week) 0 times per week   Current Exercises Include No Regular Exercise   Do you need help using the phone?  No   Are you deaf or do you have serious difficulty hearing?  No   Do you need help with transportation? No   Do you need help shopping? No   Do you need help preparing meals?  No   Do you need help with housework?  No   Do you need help with laundry? No   Do you need help taking your medications? No   Do you need help managing money? No   Do you ever drive or ride in a car without wearing a seat belt? No   Have you felt unusual stress, anger or loneliness in the last month? No   Who do you live with? Spouse   If you need help, do you have trouble finding someone available to you? No   Have you been bothered in the last four weeks by sexual problems? No   Do you have difficulty concentrating, remembering or making decisions? No       Age-appropriate Screening Schedule:  Refer to the list below for future screening recommendations based on patient's age, sex and/or medical conditions. Orders for these recommended tests are listed in the plan section. The patient has been provided with a written plan.    Health Maintenance   Topic Date Due   • TDAP/TD VACCINES (1 - Tdap) Never done   • ZOSTER VACCINE (1 of 2) Never done   • DXA SCAN  08/17/2022   • INFLUENZA VACCINE  10/01/2022                       Assessment and Plan:       CMS Preventative Services Quick Reference  Risk Factors Identified During Encounter  Cardiovascular Disease  Immunizations  Discussed/Encouraged (specific Immunizations; Tdap, Influenza, Shingrix and COVID19  Obesity/Overweight     The above risks/problems have been discussed with the patient.  Follow up actions/plans if indicated are seen below in the Assessment/Plan Section.  Pertinent information has been shared with the patient in the After Visit Summary.    Today, we have again reviewed her care.  Violetta continues to be in excellent overall health.  Her blood pressure is well controlled today.  We reviewed her use of lorazepam, and it will be refilled as noted below.  Her anxiety is mostly in good check, but she does take it sparingly.  With regard to her kidney functions, we will repeat blood work and urine testing as noted.  Her usual medications are also refilled today.  She is mostly up-to-date on needed screenings.  We will arrange mammogram and DEXA scan in about 2 months.  We will call her to come back in for COVID-19 booster when those are in stock.  Also, we discussed tetanus and shingles vaccines.  I asked her to check with her pharmacy if she would like about the shingles vaccine.  She should consider a tetanus booster were she to have a cut or scrape particularly with merly metal.    Diagnoses and all orders for this visit:    1. Physical exam (Primary)    2. Anxiety  -     LORazepam (ATIVAN) 0.5 MG tablet; Take 1 tablet by mouth 2 (Two) Times a Day As Needed for Anxiety.  Dispense: 30 tablet; Refill: 0    3. Essential hypertension  -     amLODIPine (NORVASC) 10 MG tablet; Take 1 tablet by mouth Every Night.  Dispense: 90 tablet; Refill: 3  -     atenolol (TENORMIN) 50 MG tablet; Take 1 tablet by mouth Every Night.  Dispense: 90 tablet; Refill: 3  -     losartan (COZAAR) 100 MG tablet; Take 1 tablet by mouth Every Night.  Dispense: 90 tablet; Refill: 3  -     Comprehensive Metabolic Panel; Future    4. Chronic kidney disease, stage 3b (HCC)  -     CBC (No Diff); Future  -     Urinalysis With Microscopic - Urine, Clean  Catch; Future    5. Gastroesophageal reflux disease without esophagitis  -     omeprazole (priLOSEC) 40 MG capsule; Take 1 capsule by mouth Daily.  Dispense: 90 capsule; Refill: 3    6. Other long term (current) drug therapy  -     CBC (No Diff); Future  -     TSH; Future    7. Breast cancer screening by mammogram  -     Mammo Screening Digital Tomosynthesis Bilateral With CAD; Future    8. Asymptomatic postmenopausal state  -     DEXA Bone Density Axial; Future        Follow Up:   Return in about 1 year (around 6/19/2023) for Recheck, Medicare Wellness.     An After Visit Summary and PPPS were given to the patient.

## 2022-06-17 LAB — HBA1C MFR BLD: 6.2 % (ref 4.8–5.6)

## 2022-06-22 RX ORDER — LIDOCAINE HYDROCHLORIDE 10 MG/ML
2 INJECTION, SOLUTION INFILTRATION; PERINEURAL
Status: COMPLETED | OUTPATIENT
Start: 2022-06-09 | End: 2022-06-09

## 2022-06-22 RX ORDER — METHYLPREDNISOLONE ACETATE 80 MG/ML
160 INJECTION, SUSPENSION INTRA-ARTICULAR; INTRALESIONAL; INTRAMUSCULAR; SOFT TISSUE
Status: COMPLETED | OUTPATIENT
Start: 2022-06-09 | End: 2022-06-09

## 2022-07-26 ENCOUNTER — TELEPHONE (OUTPATIENT)
Dept: ORTHOPEDIC SURGERY | Facility: CLINIC | Age: 82
End: 2022-07-26

## 2022-07-26 NOTE — TELEPHONE ENCOUNTER
CALLED AND SPOKE WITH PATIENT ABOUT SCHEDULING HER SURGERY AT [facility] AFTER LABOR DAY. SHE STATES THAT SHE AND HER  DON'T DRIVE IN [place] ANYMORE AND THAT SHE WOULD THINK THINGS OVER AND CALL ME BACK./AW

## 2022-07-27 ENCOUNTER — IMMUNIZATION (OUTPATIENT)
Dept: FAMILY MEDICINE CLINIC | Age: 82
End: 2022-07-27

## 2022-07-27 DIAGNOSIS — Z23 ENCOUNTER FOR IMMUNIZATION: Primary | ICD-10-CM

## 2022-07-27 PROCEDURE — 91305 COVID-19 (PFIZER) 12+ YRS: CPT | Performed by: FAMILY MEDICINE

## 2022-07-27 PROCEDURE — 0054A COVID-19 (PFIZER) 12+ YRS: CPT | Performed by: FAMILY MEDICINE

## 2022-08-12 ENCOUNTER — PREP FOR SURGERY (OUTPATIENT)
Dept: OTHER | Facility: HOSPITAL | Age: 82
End: 2022-08-12

## 2022-08-12 ENCOUNTER — TELEPHONE (OUTPATIENT)
Dept: ORTHOPEDIC SURGERY | Facility: CLINIC | Age: 82
End: 2022-08-12

## 2022-08-12 VITALS — WEIGHT: 176 LBS | HEIGHT: 65 IN | BODY MASS INDEX: 29.32 KG/M2

## 2022-08-12 DIAGNOSIS — M17.11 PRIMARY OSTEOARTHRITIS OF RIGHT KNEE: Primary | ICD-10-CM

## 2022-08-12 RX ORDER — CEFAZOLIN SODIUM 2 G/100ML
2 INJECTION, SOLUTION INTRAVENOUS ONCE
Status: CANCELLED | OUTPATIENT
Start: 2022-09-09 | End: 2022-08-12

## 2022-08-12 RX ORDER — PREGABALIN 75 MG/1
150 CAPSULE ORAL ONCE
Status: CANCELLED | OUTPATIENT
Start: 2022-09-09 | End: 2022-08-12

## 2022-08-12 RX ORDER — MELOXICAM 15 MG/1
15 TABLET ORAL ONCE
Status: CANCELLED | OUTPATIENT
Start: 2022-09-09 | End: 2022-08-12

## 2022-08-12 RX ORDER — ACETAMINOPHEN 10 MG/ML
1000 INJECTION, SOLUTION INTRAVENOUS ONCE
Status: CANCELLED | OUTPATIENT
Start: 2022-09-09 | End: 2022-08-12

## 2022-08-12 RX ORDER — CHLORHEXIDINE GLUCONATE 500 MG/1
CLOTH TOPICAL TAKE AS DIRECTED
Status: CANCELLED | OUTPATIENT
Start: 2022-08-12

## 2022-08-12 RX ORDER — ACETAMINOPHEN 500 MG
1000 TABLET ORAL ONCE
Status: CANCELLED | OUTPATIENT
Start: 2022-09-09 | End: 2022-08-12

## 2022-08-12 RX ORDER — POVIDONE-IODINE 10 MG/ML
1 SOLUTION TOPICAL ONCE
Status: CANCELLED | OUTPATIENT
Start: 2022-09-09 | End: 2022-08-12

## 2022-08-12 NOTE — TELEPHONE ENCOUNTER
PATIENT WANTS TO PROCEED WITH HER RIGHT TOTAL KNEE REPLACEMENT AT [facility]. PLEASE PLACE ORDERS. THANKS

## 2022-08-12 NOTE — TELEPHONE ENCOUNTER
Leydi can you please put in the orders for this patient to have total knee arthroplasty at [facility]?  Thank you very much.

## 2022-08-22 ENCOUNTER — HOSPITAL ENCOUNTER (OUTPATIENT)
Dept: BONE DENSITY | Facility: HOSPITAL | Age: 82
Discharge: HOME | End: 2022-08-22

## 2022-08-22 ENCOUNTER — HOSPITAL ENCOUNTER (OUTPATIENT)
Dept: MAMMOGRAPHY | Facility: HOSPITAL | Age: 82
Discharge: HOME | End: 2022-08-22

## 2022-08-22 DIAGNOSIS — Z12.31 BREAST CANCER SCREENING BY MAMMOGRAM: ICD-10-CM

## 2022-08-22 DIAGNOSIS — Z78.0 ASYMPTOMATIC POSTMENOPAUSAL STATE: ICD-10-CM

## 2022-08-22 PROCEDURE — 77067 SCR MAMMO BI INCL CAD: CPT

## 2022-08-22 PROCEDURE — 77080 DXA BONE DENSITY AXIAL: CPT

## 2022-08-22 PROCEDURE — 77063 BREAST TOMOSYNTHESIS BI: CPT

## 2022-09-07 ENCOUNTER — HOSPITAL ENCOUNTER (OUTPATIENT)
Dept: GENERAL RADIOLOGY | Facility: HOSPITAL | Age: 82
Discharge: HOME | End: 2022-09-07

## 2022-09-07 ENCOUNTER — PRE-ADMISSION TESTING (OUTPATIENT)
Dept: PREADMISSION TESTING | Facility: HOSPITAL | Age: 82
End: 2022-09-07

## 2022-09-07 VITALS
HEIGHT: 65 IN | OXYGEN SATURATION: 98 % | RESPIRATION RATE: 24 BRPM | BODY MASS INDEX: 29.66 KG/M2 | TEMPERATURE: 98.3 F | SYSTOLIC BLOOD PRESSURE: 120 MMHG | HEART RATE: 67 BPM | DIASTOLIC BLOOD PRESSURE: 67 MMHG | WEIGHT: 178 LBS

## 2022-09-07 DIAGNOSIS — M17.11 PRIMARY OSTEOARTHRITIS OF RIGHT KNEE: ICD-10-CM

## 2022-09-07 LAB
ABO GROUP BLD: NORMAL
ANION GAP SERPL CALCULATED.3IONS-SCNC: 11 MMOL/L (ref 5–15)
BILIRUB UR QL STRIP: NEGATIVE
BLD GP AB SCN SERPL QL: NEGATIVE
BUN SERPL-MCNC: 15 MG/DL (ref 8–23)
BUN/CREAT SERPL: 15.2 (ref 7–25)
CALCIUM SPEC-SCNC: 9.7 MG/DL (ref 8.6–10.5)
CHLORIDE SERPL-SCNC: 104 MMOL/L (ref 98–107)
CLARITY UR: CLEAR
CO2 SERPL-SCNC: 26 MMOL/L (ref 22–29)
COLOR UR: ABNORMAL
CREAT SERPL-MCNC: 0.99 MG/DL (ref 0.57–1)
DEPRECATED RDW RBC AUTO: 44.9 FL (ref 37–54)
EGFRCR SERPLBLD CKD-EPI 2021: 56.3 ML/MIN/1.73
ERYTHROCYTE [DISTWIDTH] IN BLOOD BY AUTOMATED COUNT: 12.9 % (ref 12.3–15.4)
GLUCOSE SERPL-MCNC: 97 MG/DL (ref 65–99)
GLUCOSE UR STRIP-MCNC: NEGATIVE MG/DL
HBA1C MFR BLD: 5.8 % (ref 4.8–5.6)
HCT VFR BLD AUTO: 33.8 % (ref 34–46.6)
HGB BLD-MCNC: 11 G/DL (ref 12–15.9)
HGB UR QL STRIP.AUTO: NEGATIVE
KETONES UR QL STRIP: ABNORMAL
LEUKOCYTE ESTERASE UR QL STRIP.AUTO: NEGATIVE
MCH RBC QN AUTO: 30.6 PG (ref 26.6–33)
MCHC RBC AUTO-ENTMCNC: 32.5 G/DL (ref 31.5–35.7)
MCV RBC AUTO: 94.2 FL (ref 79–97)
NITRITE UR QL STRIP: NEGATIVE
PH UR STRIP.AUTO: 5.5 [PH] (ref 5–8)
PLATELET # BLD AUTO: 255 10*3/MM3 (ref 140–450)
PMV BLD AUTO: 10.7 FL (ref 6–12)
POTASSIUM SERPL-SCNC: 4.1 MMOL/L (ref 3.5–5.2)
PROT UR QL STRIP: NEGATIVE
QT INTERVAL: 407 MS
RBC # BLD AUTO: 3.59 10*6/MM3 (ref 3.77–5.28)
RH BLD: POSITIVE
SODIUM SERPL-SCNC: 141 MMOL/L (ref 136–145)
SP GR UR STRIP: 1.03 (ref 1–1.03)
T&S EXPIRATION DATE: NORMAL
UROBILINOGEN UR QL STRIP: ABNORMAL
WBC NRBC COR # BLD: 4.68 10*3/MM3 (ref 3.4–10.8)

## 2022-09-07 PROCEDURE — 81003 URINALYSIS AUTO W/O SCOPE: CPT

## 2022-09-07 PROCEDURE — 93010 ELECTROCARDIOGRAM REPORT: CPT | Performed by: INTERNAL MEDICINE

## 2022-09-07 PROCEDURE — 36415 COLL VENOUS BLD VENIPUNCTURE: CPT

## 2022-09-07 PROCEDURE — 85027 COMPLETE CBC AUTOMATED: CPT

## 2022-09-07 PROCEDURE — 73560 X-RAY EXAM OF KNEE 1 OR 2: CPT

## 2022-09-07 PROCEDURE — 86900 BLOOD TYPING SEROLOGIC ABO: CPT

## 2022-09-07 PROCEDURE — 80048 BASIC METABOLIC PNL TOTAL CA: CPT

## 2022-09-07 PROCEDURE — 93005 ELECTROCARDIOGRAM TRACING: CPT

## 2022-09-07 PROCEDURE — 86901 BLOOD TYPING SEROLOGIC RH(D): CPT

## 2022-09-07 PROCEDURE — 83036 HEMOGLOBIN GLYCOSYLATED A1C: CPT

## 2022-09-07 PROCEDURE — 86850 RBC ANTIBODY SCREEN: CPT

## 2022-09-07 RX ORDER — CETIRIZINE HYDROCHLORIDE 10 MG/1
10 TABLET ORAL AS NEEDED
COMMUNITY

## 2022-09-07 RX ORDER — LOSARTAN POTASSIUM 100 MG/1
1 TABLET ORAL NIGHTLY
COMMUNITY
Start: 2022-08-24 | End: 2022-09-07

## 2022-09-07 RX ORDER — CHLORHEXIDINE GLUCONATE 500 MG/1
CLOTH TOPICAL TAKE AS DIRECTED
Status: ACTIVE | OUTPATIENT
Start: 2022-09-07

## 2022-09-07 NOTE — DISCHARGE INSTRUCTIONS
Take the following medications the morning of surgery:    NONE    ARRIVE TO OUTPATIENT SURGERY CENTER 9/9/22 AT 8:00AM    If you are on prescription narcotic pain medication to control your pain you may also take that medication the morning of surgery.    General Instructions:  Do not eat solid food after midnight the night before surgery.  You may drink clear liquids day of surgery but must stop at least one hour before your hospital arrival time.  It is beneficial for you to have a clear drink that contains carbohydrates the day of surgery.  We suggest a 12 to 20 ounce bottle of Gatorade or Powerade for non-diabetic patients or a 12 to 20 ounce bottle of G2 or Powerade Zero for diabetic patients. (Pediatric patients, are not advised to drink a 12 to 20 ounce carbohydrate drink)    Clear liquids are liquids you can see through.  Nothing red in color.     Plain water                               Sports drinks  Sodas                                   Gelatin (Jell-O)  Fruit juices without pulp such as white grape juice and apple juice  Popsicles that contain no fruit or yogurt  Tea or coffee (no cream or milk added)  Gatorade / Powerade  G2 / Powerade Zero    Infants may have breast milk up to four hours before surgery.  Infants drinking formula may drink formula up to six hours before surgery.   Patients who avoid smoking, chewing tobacco and alcohol for 4 weeks prior to surgery have a reduced risk of post-operative complications.  Quit smoking as many days before surgery as you can.  Do not smoke, use chewing tobacco or drink alcohol the day of surgery.   If applicable bring your C-PAP/ BI-PAP machine.  Bring any papers given to you in the doctor’s office.  Wear clean comfortable clothes.  Do not wear contact lenses, false eyelashes or make-up.  Bring a case for your glasses.   Bring crutches or walker if applicable.  Remove all piercings.  Leave jewelry and any other valuables at home.  Hair extensions with metal  clips must be removed prior to surgery.  The Pre-Admission Testing nurse will instruct you to bring medications if unable to obtain an accurate list in Pre-Admission Testing.        If you were given a blood bank ID arm band remember to bring it with you the day of surgery.    Preventing a Surgical Site Infection:  For 2 to 3 days before surgery, avoid shaving with a razor because the razor can irritate skin and make it easier to develop an infection.    Any areas of open skin can increase the risk of a post-operative wound infection by allowing bacteria to enter and travel throughout the body.  Notify your surgeon if you have any skin wounds / rashes even if it is not near the expected surgical site.  The area will need assessed to determine if surgery should be delayed until it is healed.  The night prior to surgery shower using a fresh bar of anti-bacterial soap (such as Dial) and clean washcloth.  Sleep in a clean bed with clean clothing.  Do not allow pets to sleep with you.  Shower on the morning of surgery using a fresh bar of anti-bacterial soap (such as Dial) and clean washcloth.  Dry with a clean towel and dress in clean clothing.  Ask your surgeon if you will be receiving antibiotics prior to surgery.  Make sure you, your family, and all healthcare providers clean their hands with soap and water or an alcohol based hand  before caring for you or your wound.    Day of surgery:  Your arrival time is approximately two hours before your scheduled surgery time.  Upon arrival, a Pre-op nurse and Anesthesiologist will review your health history, obtain vital signs, and answer questions you may have.  The only belongings needed at this time will be a list of your home medications and if applicable your C-PAP/BI-PAP machine.  A Pre-op nurse will start an IV and you may receive medication in preparation for surgery, including something to help you relax.     Please be aware that surgery does come with  discomfort.  We want to make every effort to control your discomfort so please discuss any uncontrolled symptoms with your nurse.   Your doctor will most likely have prescribed pain medications.      If you are going home after surgery you will receive individualized written care instructions before being discharged.  A responsible adult must drive you to and from the hospital on the day of your surgery and stay with you for 24 hours.  Discharge prescriptions can be filled by the hospital pharmacy during regular pharmacy hours.  If you are having surgery late in the day/evening your prescription may be e-prescribed to your pharmacy.  Please verify your pharmacy hours or chose a 24 hour pharmacy to avoid not having access to your prescription because your pharmacy has closed for the day.    If you are staying overnight following surgery, you will be transported to your hospital room following the recovery period.  [facility] has all private rooms.    If you have any questions please call Pre-Admission Testing at [phone].  Deductibles and co-payments are collected on the day of service. Please be prepared to pay the required co-pay, deductible or deposit on the day of service as defined by your plan.    Call your surgeon immediately if you experience any of the following symptoms:  Sore Throat  Shortness of Breath or difficulty breathing  Cough  Chills  Body soreness or muscle pain  Headache  Fever  New loss of taste or smell  Do not arrive for your surgery ill.  Your procedure will need to be rescheduled to another time.  You will need to call your physician before the day of surgery to avoid any unnecessary exposure to hospital staff as well as other patients.       CHLORHEXIDINE CLOTH INSTRUCTIONS  The morning of surgery follow these instructions using the Chlorhexidine cloths you've been given.  These steps reduce bacteria on the body.  Do not use the cloths near your eyes, ears mouth,  genitalia or on open wounds.  Throw the cloths away after use but do not try to flush them down a toilet.      Open and remove one cloth at a time from the package.    Leave the cloth unfolded and begin the bathing.  Massage the skin with the cloths using gentle pressure to remove bacteria.  Do not scrub harshly.   Follow the steps below with one 2% CHG cloth per area (6 total cloths).  One cloth for neck, shoulders and chest.  One cloth for both arms, hands, fingers and underarms (do underarms last).  One cloth for the abdomen followed by groin.  One cloth for right leg and foot including between the toes.  One cloth for left leg and foot including between the toes.  The last cloth is to be used for the back of the neck, back and buttocks.    Allow the CHG to air dry 3 minutes on the skin which will give it time to work and decrease the chance of irritation.  The skin may feel sticky until it is dry.  Do not rinse with water or any other liquid or you will lose the beneficial effects of the CHG.  If mild skin irritation occurs, do rinse the skin to remove the CHG.  Report this to the nurse at time of admission.  Do not apply lotions, creams, ointments, deodorants or perfumes after using the clothes. Dress in clean clothes before coming to the hospital.

## 2022-09-09 ENCOUNTER — ANESTHESIA EVENT (OUTPATIENT)
Dept: PERIOP | Facility: HOSPITAL | Age: 82
End: 2022-09-09

## 2022-09-09 ENCOUNTER — APPOINTMENT (OUTPATIENT)
Dept: GENERAL RADIOLOGY | Facility: HOSPITAL | Age: 82
End: 2022-09-09

## 2022-09-09 ENCOUNTER — HOSPITAL ENCOUNTER (OUTPATIENT)
Facility: HOSPITAL | Age: 82
Discharge: HOME | End: 2022-09-10
Attending: ORTHOPAEDIC SURGERY | Admitting: ORTHOPAEDIC SURGERY

## 2022-09-09 ENCOUNTER — ANESTHESIA (OUTPATIENT)
Dept: PERIOP | Facility: HOSPITAL | Age: 82
End: 2022-09-09

## 2022-09-09 DIAGNOSIS — Z96.652 S/P TKR (TOTAL KNEE REPLACEMENT), LEFT: Primary | ICD-10-CM

## 2022-09-09 DIAGNOSIS — M17.11 PRIMARY OSTEOARTHRITIS OF RIGHT KNEE: ICD-10-CM

## 2022-09-09 PROCEDURE — 25010000002 DEXAMETHASONE PER 1 MG: Performed by: ANESTHESIOLOGY

## 2022-09-09 PROCEDURE — 25010000002 FENTANYL CITRATE (PF) 50 MCG/ML SOLUTION: Performed by: ANESTHESIOLOGY

## 2022-09-09 PROCEDURE — 25010000002 MEPIVACAINE PF 2 % SOLUTION 20 ML VIAL: Performed by: ANESTHESIOLOGY

## 2022-09-09 PROCEDURE — A9270 NON-COVERED ITEM OR SERVICE: HCPCS | Performed by: ORTHOPAEDIC SURGERY

## 2022-09-09 PROCEDURE — 25010000002 PROPOFOL 10 MG/ML EMULSION: Performed by: ANESTHESIOLOGY

## 2022-09-09 PROCEDURE — C1776 JOINT DEVICE (IMPLANTABLE): HCPCS | Performed by: ORTHOPAEDIC SURGERY

## 2022-09-09 PROCEDURE — 25010000002 CEFAZOLIN PER 500 MG: Performed by: ORTHOPAEDIC SURGERY

## 2022-09-09 PROCEDURE — 63710000001 LOSARTAN 100 MG TABLET: Performed by: ORTHOPAEDIC SURGERY

## 2022-09-09 PROCEDURE — 27447 TOTAL KNEE ARTHROPLASTY: CPT | Performed by: ORTHOPAEDIC SURGERY

## 2022-09-09 PROCEDURE — 63710000001 ATENOLOL 50 MG TABLET: Performed by: ORTHOPAEDIC SURGERY

## 2022-09-09 PROCEDURE — 25010000002 ONDANSETRON PER 1 MG: Performed by: ANESTHESIOLOGY

## 2022-09-09 PROCEDURE — 25010000002 VANCOMYCIN 10 G RECONSTITUTED SOLUTION: Performed by: ORTHOPAEDIC SURGERY

## 2022-09-09 PROCEDURE — 25010000002 NEOSTIGMINE 5 MG/10ML SOLUTION: Performed by: ANESTHESIOLOGY

## 2022-09-09 PROCEDURE — 63710000001 MELOXICAM 15 MG TABLET: Performed by: ORTHOPAEDIC SURGERY

## 2022-09-09 PROCEDURE — G0378 HOSPITAL OBSERVATION PER HR: HCPCS

## 2022-09-09 PROCEDURE — 25010000002 PHENYLEPHRINE 10 MG/ML SOLUTION: Performed by: ANESTHESIOLOGY

## 2022-09-09 PROCEDURE — 20985 CPTR-ASST DIR MS PX: CPT | Performed by: ORTHOPAEDIC SURGERY

## 2022-09-09 PROCEDURE — C1713 ANCHOR/SCREW BN/BN,TIS/BN: HCPCS | Performed by: ORTHOPAEDIC SURGERY

## 2022-09-09 PROCEDURE — 0 BUPIVACAINE LIPOSOME 1.3 % SUSPENSION: Performed by: ORTHOPAEDIC SURGERY

## 2022-09-09 PROCEDURE — 63710000001 SENNOSIDES-DOCUSATE 8.6-50 MG TABLET: Performed by: ORTHOPAEDIC SURGERY

## 2022-09-09 PROCEDURE — 25010000002 ROPIVACAINE PER 1 MG: Performed by: ANESTHESIOLOGY

## 2022-09-09 PROCEDURE — 97530 THERAPEUTIC ACTIVITIES: CPT

## 2022-09-09 PROCEDURE — 63710000001 PREGABALIN 75 MG CAPSULE: Performed by: ORTHOPAEDIC SURGERY

## 2022-09-09 PROCEDURE — 97161 PT EVAL LOW COMPLEX 20 MIN: CPT

## 2022-09-09 PROCEDURE — 63710000001 ACETAMINOPHEN 500 MG TABLET: Performed by: ORTHOPAEDIC SURGERY

## 2022-09-09 PROCEDURE — 76942 ECHO GUIDE FOR BIOPSY: CPT | Performed by: ORTHOPAEDIC SURGERY

## 2022-09-09 PROCEDURE — 73560 X-RAY EXAM OF KNEE 1 OR 2: CPT

## 2022-09-09 PROCEDURE — 63710000001 POLYETHYLENE GLYCOL 17 G PACK: Performed by: ORTHOPAEDIC SURGERY

## 2022-09-09 PROCEDURE — A9270 NON-COVERED ITEM OR SERVICE: HCPCS | Performed by: ANESTHESIOLOGY

## 2022-09-09 PROCEDURE — 25010000002 ROPIVACAINE PER 1 MG: Performed by: ORTHOPAEDIC SURGERY

## 2022-09-09 PROCEDURE — 63710000001 AMLODIPINE 10 MG TABLET: Performed by: ORTHOPAEDIC SURGERY

## 2022-09-09 PROCEDURE — 25010000002 CEFAZOLIN IN DEXTROSE 2-4 GM/100ML-% SOLUTION: Performed by: ORTHOPAEDIC SURGERY

## 2022-09-09 PROCEDURE — 25010000002 MIDAZOLAM PER 1 MG: Performed by: ANESTHESIOLOGY

## 2022-09-09 PROCEDURE — S0260 H&P FOR SURGERY: HCPCS | Performed by: ORTHOPAEDIC SURGERY

## 2022-09-09 PROCEDURE — C9290 INJ, BUPIVACAINE LIPOSOME: HCPCS | Performed by: ORTHOPAEDIC SURGERY

## 2022-09-09 PROCEDURE — 63710000001 HYDROCODONE-ACETAMINOPHEN 7.5-325 MG TABLET: Performed by: ANESTHESIOLOGY

## 2022-09-09 DEVICE — EXT STEM FEM/KN PERSONA TPR 14XPLS30MM: Type: IMPLANTABLE DEVICE | Site: KNEE | Status: FUNCTIONAL

## 2022-09-09 DEVICE — ART/SRF KN PERSONA/VE MC EF 8TO11 10MM RT: Type: IMPLANTABLE DEVICE | Site: KNEE | Status: FUNCTIONAL

## 2022-09-09 DEVICE — VIOLET ANTIBACTERIAL POLYDIOXANONE, KNOTLESS TISSUE CONTROL DEVICE
Type: IMPLANTABLE DEVICE | Site: KNEE | Status: FUNCTIONAL
Brand: STRATAFIX

## 2022-09-09 DEVICE — KNOTLESS TISSUE CONTROL DEVICE, UNDYED UNIDIRECTIONAL (ANTIBACTERIAL) SYNTHETIC ABSORBABLE DEVICE
Type: IMPLANTABLE DEVICE | Site: KNEE | Status: FUNCTIONAL
Brand: STRATAFIX

## 2022-09-09 DEVICE — COMP FEM/KN PERSONA CR CMT NRW SZ9 RT: Type: IMPLANTABLE DEVICE | Site: KNEE | Status: FUNCTIONAL

## 2022-09-09 DEVICE — STEM TIB/KN PERSONA CMT 5D SZE RT: Type: IMPLANTABLE DEVICE | Site: KNEE | Status: FUNCTIONAL

## 2022-09-09 DEVICE — IMPLANTABLE DEVICE
Type: IMPLANTABLE DEVICE | Site: KNEE | Status: FUNCTIONAL
Brand: NEXGEN®

## 2022-09-09 DEVICE — PAT KN PERSONA VE CRS/LNK CMT 9.5X38MM: Type: IMPLANTABLE DEVICE | Site: KNEE | Status: FUNCTIONAL

## 2022-09-09 DEVICE — CAP EXT STEM KN UPCHRG: Type: IMPLANTABLE DEVICE | Site: KNEE | Status: FUNCTIONAL

## 2022-09-09 DEVICE — CMT BONE REFOBACIN R W/GENT 1X40: Type: IMPLANTABLE DEVICE | Site: KNEE | Status: FUNCTIONAL

## 2022-09-09 DEVICE — CAP TOTL KN CMT PRIMARY W/ROSA: Type: IMPLANTABLE DEVICE | Site: KNEE | Status: FUNCTIONAL

## 2022-09-09 RX ORDER — HYDROMORPHONE HYDROCHLORIDE 1 MG/ML
0.5 INJECTION, SOLUTION INTRAMUSCULAR; INTRAVENOUS; SUBCUTANEOUS
Status: DISCONTINUED | OUTPATIENT
Start: 2022-09-09 | End: 2022-09-09 | Stop reason: HOSPADM

## 2022-09-09 RX ORDER — BISACODYL 10 MG
10 SUPPOSITORY, RECTAL RECTAL DAILY PRN
Status: DISCONTINUED | OUTPATIENT
Start: 2022-09-09 | End: 2022-09-10 | Stop reason: HOSPADM

## 2022-09-09 RX ORDER — PANTOPRAZOLE SODIUM 40 MG/1
40 TABLET, DELAYED RELEASE ORAL EVERY MORNING
Status: DISCONTINUED | OUTPATIENT
Start: 2022-09-10 | End: 2022-09-10 | Stop reason: HOSPADM

## 2022-09-09 RX ORDER — SODIUM CHLORIDE, SODIUM LACTATE, POTASSIUM CHLORIDE, CALCIUM CHLORIDE 600; 310; 30; 20 MG/100ML; MG/100ML; MG/100ML; MG/100ML
75 INJECTION, SOLUTION INTRAVENOUS CONTINUOUS
Status: DISCONTINUED | OUTPATIENT
Start: 2022-09-09 | End: 2022-09-10 | Stop reason: HOSPADM

## 2022-09-09 RX ORDER — CEFAZOLIN SODIUM 2 G/100ML
2 INJECTION, SOLUTION INTRAVENOUS EVERY 8 HOURS
Status: COMPLETED | OUTPATIENT
Start: 2022-09-09 | End: 2022-09-10

## 2022-09-09 RX ORDER — ONDANSETRON 2 MG/ML
INJECTION INTRAMUSCULAR; INTRAVENOUS AS NEEDED
Status: DISCONTINUED | OUTPATIENT
Start: 2022-09-09 | End: 2022-09-09 | Stop reason: SURG

## 2022-09-09 RX ORDER — FLUMAZENIL 0.1 MG/ML
0.2 INJECTION INTRAVENOUS AS NEEDED
Status: DISCONTINUED | OUTPATIENT
Start: 2022-09-09 | End: 2022-09-09 | Stop reason: HOSPADM

## 2022-09-09 RX ORDER — ROPIVACAINE HYDROCHLORIDE 5 MG/ML
INJECTION, SOLUTION EPIDURAL; INFILTRATION; PERINEURAL AS NEEDED
Status: DISCONTINUED | OUTPATIENT
Start: 2022-09-09 | End: 2022-09-09 | Stop reason: HOSPADM

## 2022-09-09 RX ORDER — SODIUM CHLORIDE 0.9 % (FLUSH) 0.9 %
3-10 SYRINGE (ML) INJECTION AS NEEDED
Status: DISCONTINUED | OUTPATIENT
Start: 2022-09-09 | End: 2022-09-09 | Stop reason: HOSPADM

## 2022-09-09 RX ORDER — LIDOCAINE HYDROCHLORIDE 20 MG/ML
INJECTION, SOLUTION INFILTRATION; PERINEURAL AS NEEDED
Status: DISCONTINUED | OUTPATIENT
Start: 2022-09-09 | End: 2022-09-09 | Stop reason: SURG

## 2022-09-09 RX ORDER — BISACODYL 5 MG/1
10 TABLET, DELAYED RELEASE ORAL DAILY PRN
Status: DISCONTINUED | OUTPATIENT
Start: 2022-09-09 | End: 2022-09-10 | Stop reason: HOSPADM

## 2022-09-09 RX ORDER — LORAZEPAM 0.5 MG/1
0.5 TABLET ORAL 2 TIMES DAILY PRN
Status: DISCONTINUED | OUTPATIENT
Start: 2022-09-09 | End: 2022-09-10 | Stop reason: HOSPADM

## 2022-09-09 RX ORDER — OXYCODONE HYDROCHLORIDE AND ACETAMINOPHEN 5; 325 MG/1; MG/1
1 TABLET ORAL EVERY 4 HOURS PRN
Status: DISCONTINUED | OUTPATIENT
Start: 2022-09-09 | End: 2022-09-10 | Stop reason: HOSPADM

## 2022-09-09 RX ORDER — CEFAZOLIN SODIUM 2 G/100ML
2 INJECTION, SOLUTION INTRAVENOUS ONCE
Status: COMPLETED | OUTPATIENT
Start: 2022-09-09 | End: 2022-09-09

## 2022-09-09 RX ORDER — AMLODIPINE BESYLATE 10 MG/1
10 TABLET ORAL NIGHTLY
Status: DISCONTINUED | OUTPATIENT
Start: 2022-09-09 | End: 2022-09-10 | Stop reason: HOSPADM

## 2022-09-09 RX ORDER — MAGNESIUM HYDROXIDE 1200 MG/15ML
LIQUID ORAL AS NEEDED
Status: DISCONTINUED | OUTPATIENT
Start: 2022-09-09 | End: 2022-09-09 | Stop reason: HOSPADM

## 2022-09-09 RX ORDER — MIDAZOLAM HYDROCHLORIDE 1 MG/ML
0.5 INJECTION INTRAMUSCULAR; INTRAVENOUS
Status: DISCONTINUED | OUTPATIENT
Start: 2022-09-09 | End: 2022-09-09 | Stop reason: HOSPADM

## 2022-09-09 RX ORDER — GLYCOPYRROLATE 0.2 MG/ML
INJECTION INTRAMUSCULAR; INTRAVENOUS AS NEEDED
Status: DISCONTINUED | OUTPATIENT
Start: 2022-09-09 | End: 2022-09-09 | Stop reason: SURG

## 2022-09-09 RX ORDER — DEXAMETHASONE SODIUM PHOSPHATE 4 MG/ML
INJECTION, SOLUTION INTRA-ARTICULAR; INTRALESIONAL; INTRAMUSCULAR; INTRAVENOUS; SOFT TISSUE
Status: COMPLETED | OUTPATIENT
Start: 2022-09-09 | End: 2022-09-09

## 2022-09-09 RX ORDER — MORPHINE SULFATE 2 MG/ML
4 INJECTION, SOLUTION INTRAMUSCULAR; INTRAVENOUS
Status: DISCONTINUED | OUTPATIENT
Start: 2022-09-09 | End: 2022-09-10 | Stop reason: HOSPADM

## 2022-09-09 RX ORDER — HYDROCODONE BITARTRATE AND ACETAMINOPHEN 7.5; 325 MG/1; MG/1
1 TABLET ORAL ONCE AS NEEDED
Status: COMPLETED | OUTPATIENT
Start: 2022-09-09 | End: 2022-09-09

## 2022-09-09 RX ORDER — FENTANYL CITRATE 50 UG/ML
INJECTION, SOLUTION INTRAMUSCULAR; INTRAVENOUS AS NEEDED
Status: DISCONTINUED | OUTPATIENT
Start: 2022-09-09 | End: 2022-09-09 | Stop reason: SURG

## 2022-09-09 RX ORDER — DIPHENHYDRAMINE HYDROCHLORIDE 50 MG/ML
12.5 INJECTION INTRAMUSCULAR; INTRAVENOUS
Status: DISCONTINUED | OUTPATIENT
Start: 2022-09-09 | End: 2022-09-09 | Stop reason: HOSPADM

## 2022-09-09 RX ORDER — EPHEDRINE SULFATE 50 MG/ML
INJECTION, SOLUTION INTRAVENOUS AS NEEDED
Status: DISCONTINUED | OUTPATIENT
Start: 2022-09-09 | End: 2022-09-09 | Stop reason: SURG

## 2022-09-09 RX ORDER — FENTANYL CITRATE 50 UG/ML
50 INJECTION, SOLUTION INTRAMUSCULAR; INTRAVENOUS
Status: DISCONTINUED | OUTPATIENT
Start: 2022-09-09 | End: 2022-09-09 | Stop reason: HOSPADM

## 2022-09-09 RX ORDER — HYDRALAZINE HYDROCHLORIDE 20 MG/ML
5 INJECTION INTRAMUSCULAR; INTRAVENOUS
Status: DISCONTINUED | OUTPATIENT
Start: 2022-09-09 | End: 2022-09-09 | Stop reason: HOSPADM

## 2022-09-09 RX ORDER — NEOSTIGMINE METHYLSULFATE 0.5 MG/ML
INJECTION, SOLUTION INTRAVENOUS AS NEEDED
Status: DISCONTINUED | OUTPATIENT
Start: 2022-09-09 | End: 2022-09-09 | Stop reason: SURG

## 2022-09-09 RX ORDER — ACETAMINOPHEN 325 MG/1
325 TABLET ORAL EVERY 4 HOURS PRN
Status: DISCONTINUED | OUTPATIENT
Start: 2022-09-09 | End: 2022-09-10 | Stop reason: HOSPADM

## 2022-09-09 RX ORDER — ONDANSETRON 2 MG/ML
4 INJECTION INTRAMUSCULAR; INTRAVENOUS EVERY 6 HOURS PRN
Status: DISCONTINUED | OUTPATIENT
Start: 2022-09-09 | End: 2022-09-10 | Stop reason: HOSPADM

## 2022-09-09 RX ORDER — NALOXONE HCL 0.4 MG/ML
0.2 VIAL (ML) INJECTION AS NEEDED
Status: DISCONTINUED | OUTPATIENT
Start: 2022-09-09 | End: 2022-09-09 | Stop reason: HOSPADM

## 2022-09-09 RX ORDER — FAMOTIDINE 10 MG/ML
20 INJECTION, SOLUTION INTRAVENOUS ONCE
Status: COMPLETED | OUTPATIENT
Start: 2022-09-09 | End: 2022-09-09

## 2022-09-09 RX ORDER — ONDANSETRON 4 MG/1
4 TABLET, FILM COATED ORAL EVERY 6 HOURS PRN
Status: DISCONTINUED | OUTPATIENT
Start: 2022-09-09 | End: 2022-09-10 | Stop reason: HOSPADM

## 2022-09-09 RX ORDER — PROMETHAZINE HYDROCHLORIDE 25 MG/1
25 SUPPOSITORY RECTAL ONCE AS NEEDED
Status: DISCONTINUED | OUTPATIENT
Start: 2022-09-09 | End: 2022-09-09 | Stop reason: HOSPADM

## 2022-09-09 RX ORDER — DIPHENHYDRAMINE HCL 25 MG
25 CAPSULE ORAL
Status: DISCONTINUED | OUTPATIENT
Start: 2022-09-09 | End: 2022-09-09 | Stop reason: HOSPADM

## 2022-09-09 RX ORDER — ATENOLOL 50 MG/1
50 TABLET ORAL NIGHTLY
Status: DISCONTINUED | OUTPATIENT
Start: 2022-09-09 | End: 2022-09-10 | Stop reason: HOSPADM

## 2022-09-09 RX ORDER — PREGABALIN 75 MG/1
150 CAPSULE ORAL ONCE
Status: COMPLETED | OUTPATIENT
Start: 2022-09-09 | End: 2022-09-09

## 2022-09-09 RX ORDER — NALOXONE HCL 0.4 MG/ML
0.4 VIAL (ML) INJECTION
Status: DISCONTINUED | OUTPATIENT
Start: 2022-09-09 | End: 2022-09-10 | Stop reason: HOSPADM

## 2022-09-09 RX ORDER — ACETAMINOPHEN 10 MG/ML
1000 INJECTION, SOLUTION INTRAVENOUS ONCE
Status: DISCONTINUED | OUTPATIENT
Start: 2022-09-09 | End: 2022-09-09 | Stop reason: HOSPADM

## 2022-09-09 RX ORDER — PROPOFOL 10 MG/ML
VIAL (ML) INTRAVENOUS AS NEEDED
Status: DISCONTINUED | OUTPATIENT
Start: 2022-09-09 | End: 2022-09-09 | Stop reason: SURG

## 2022-09-09 RX ORDER — PHENYLEPHRINE HYDROCHLORIDE 10 MG/ML
INJECTION INTRAVENOUS AS NEEDED
Status: DISCONTINUED | OUTPATIENT
Start: 2022-09-09 | End: 2022-09-09 | Stop reason: SURG

## 2022-09-09 RX ORDER — CETIRIZINE HYDROCHLORIDE 10 MG/1
10 TABLET ORAL AS NEEDED
Status: DISCONTINUED | OUTPATIENT
Start: 2022-09-09 | End: 2022-09-10 | Stop reason: HOSPADM

## 2022-09-09 RX ORDER — MELOXICAM 15 MG/1
15 TABLET ORAL ONCE
Status: COMPLETED | OUTPATIENT
Start: 2022-09-09 | End: 2022-09-09

## 2022-09-09 RX ORDER — SODIUM CHLORIDE 0.9 % (FLUSH) 0.9 %
3 SYRINGE (ML) INJECTION EVERY 12 HOURS SCHEDULED
Status: DISCONTINUED | OUTPATIENT
Start: 2022-09-09 | End: 2022-09-09 | Stop reason: HOSPADM

## 2022-09-09 RX ORDER — LABETALOL HYDROCHLORIDE 5 MG/ML
5 INJECTION, SOLUTION INTRAVENOUS
Status: DISCONTINUED | OUTPATIENT
Start: 2022-09-09 | End: 2022-09-09 | Stop reason: HOSPADM

## 2022-09-09 RX ORDER — EPHEDRINE SULFATE 50 MG/ML
5 INJECTION, SOLUTION INTRAVENOUS ONCE AS NEEDED
Status: DISCONTINUED | OUTPATIENT
Start: 2022-09-09 | End: 2022-09-09 | Stop reason: HOSPADM

## 2022-09-09 RX ORDER — TRANEXAMIC ACID 100 MG/ML
INJECTION, SOLUTION INTRAVENOUS AS NEEDED
Status: DISCONTINUED | OUTPATIENT
Start: 2022-09-09 | End: 2022-09-09 | Stop reason: SURG

## 2022-09-09 RX ORDER — ROPIVACAINE HYDROCHLORIDE 5 MG/ML
INJECTION, SOLUTION EPIDURAL; INFILTRATION; PERINEURAL
Status: COMPLETED | OUTPATIENT
Start: 2022-09-09 | End: 2022-09-09

## 2022-09-09 RX ORDER — SODIUM CHLORIDE, SODIUM LACTATE, POTASSIUM CHLORIDE, CALCIUM CHLORIDE 600; 310; 30; 20 MG/100ML; MG/100ML; MG/100ML; MG/100ML
9 INJECTION, SOLUTION INTRAVENOUS CONTINUOUS
Status: DISCONTINUED | OUTPATIENT
Start: 2022-09-09 | End: 2022-09-09

## 2022-09-09 RX ORDER — OXYCODONE HYDROCHLORIDE AND ACETAMINOPHEN 5; 325 MG/1; MG/1
1 TABLET ORAL EVERY 4 HOURS PRN
Qty: 40 TABLET | Refills: 0 | Status: SHIPPED | OUTPATIENT
Start: 2022-09-09 | End: 2022-09-22 | Stop reason: SDUPTHER

## 2022-09-09 RX ORDER — POVIDONE-IODINE 10 MG/ML
1 SOLUTION TOPICAL ONCE
Status: COMPLETED | OUTPATIENT
Start: 2022-09-09 | End: 2022-09-09

## 2022-09-09 RX ORDER — LIDOCAINE HYDROCHLORIDE 10 MG/ML
0.5 INJECTION, SOLUTION EPIDURAL; INFILTRATION; INTRACAUDAL; PERINEURAL ONCE AS NEEDED
Status: DISCONTINUED | OUTPATIENT
Start: 2022-09-09 | End: 2022-09-09 | Stop reason: HOSPADM

## 2022-09-09 RX ORDER — LOSARTAN POTASSIUM 100 MG/1
100 TABLET ORAL NIGHTLY
Status: DISCONTINUED | OUTPATIENT
Start: 2022-09-09 | End: 2022-09-10 | Stop reason: HOSPADM

## 2022-09-09 RX ORDER — ROCURONIUM BROMIDE 10 MG/ML
INJECTION, SOLUTION INTRAVENOUS AS NEEDED
Status: DISCONTINUED | OUTPATIENT
Start: 2022-09-09 | End: 2022-09-09 | Stop reason: SURG

## 2022-09-09 RX ORDER — ACETAMINOPHEN 500 MG
1000 TABLET ORAL ONCE
Status: COMPLETED | OUTPATIENT
Start: 2022-09-09 | End: 2022-09-09

## 2022-09-09 RX ORDER — OXYCODONE AND ACETAMINOPHEN 7.5; 325 MG/1; MG/1
1 TABLET ORAL EVERY 4 HOURS PRN
Status: DISCONTINUED | OUTPATIENT
Start: 2022-09-09 | End: 2022-09-09 | Stop reason: HOSPADM

## 2022-09-09 RX ORDER — AMOXICILLIN 250 MG
1 CAPSULE ORAL 2 TIMES DAILY
Status: DISCONTINUED | OUTPATIENT
Start: 2022-09-09 | End: 2022-09-10 | Stop reason: HOSPADM

## 2022-09-09 RX ORDER — PROMETHAZINE HYDROCHLORIDE 25 MG/1
25 TABLET ORAL ONCE AS NEEDED
Status: DISCONTINUED | OUTPATIENT
Start: 2022-09-09 | End: 2022-09-09 | Stop reason: HOSPADM

## 2022-09-09 RX ORDER — POLYETHYLENE GLYCOL 3350 17 G/17G
17 POWDER, FOR SOLUTION ORAL DAILY
Status: DISCONTINUED | OUTPATIENT
Start: 2022-09-09 | End: 2022-09-10 | Stop reason: HOSPADM

## 2022-09-09 RX ORDER — ONDANSETRON 2 MG/ML
4 INJECTION INTRAMUSCULAR; INTRAVENOUS ONCE AS NEEDED
Status: DISCONTINUED | OUTPATIENT
Start: 2022-09-09 | End: 2022-09-09 | Stop reason: HOSPADM

## 2022-09-09 RX ORDER — OXYCODONE HYDROCHLORIDE AND ACETAMINOPHEN 5; 325 MG/1; MG/1
2 TABLET ORAL EVERY 4 HOURS PRN
Status: DISCONTINUED | OUTPATIENT
Start: 2022-09-09 | End: 2022-09-10 | Stop reason: HOSPADM

## 2022-09-09 RX ADMIN — CEFAZOLIN SODIUM 2 G: 2 INJECTION, SOLUTION INTRAVENOUS at 10:14

## 2022-09-09 RX ADMIN — ACETAMINOPHEN 1000 MG: 500 TABLET, FILM COATED ORAL at 09:07

## 2022-09-09 RX ADMIN — FENTANYL CITRATE 25 MCG: 50 INJECTION INTRAMUSCULAR; INTRAVENOUS at 11:11

## 2022-09-09 RX ADMIN — SODIUM CHLORIDE, POTASSIUM CHLORIDE, SODIUM LACTATE AND CALCIUM CHLORIDE: 600; 310; 30; 20 INJECTION, SOLUTION INTRAVENOUS at 11:57

## 2022-09-09 RX ADMIN — FENTANYL CITRATE 50 MCG: 50 INJECTION INTRAMUSCULAR; INTRAVENOUS at 09:29

## 2022-09-09 RX ADMIN — DOCUSATE SODIUM 50MG AND SENNOSIDES 8.6MG 1 TABLET: 8.6; 5 TABLET, FILM COATED ORAL at 21:46

## 2022-09-09 RX ADMIN — POLYETHYLENE GLYCOL 3350 17 G: 17 POWDER, FOR SOLUTION ORAL at 15:00

## 2022-09-09 RX ADMIN — MELOXICAM 15 MG: 15 TABLET ORAL at 09:07

## 2022-09-09 RX ADMIN — TRANEXAMIC ACID 1000 MG: 1 INJECTION, SOLUTION INTRAVENOUS at 10:48

## 2022-09-09 RX ADMIN — AMLODIPINE BESYLATE 10 MG: 10 TABLET ORAL at 21:46

## 2022-09-09 RX ADMIN — SODIUM CHLORIDE, POTASSIUM CHLORIDE, SODIUM LACTATE AND CALCIUM CHLORIDE 500 ML: 600; 310; 30; 20 INJECTION, SOLUTION INTRAVENOUS at 09:20

## 2022-09-09 RX ADMIN — CEFAZOLIN SODIUM 2 G: 2 INJECTION, SOLUTION INTRAVENOUS at 18:30

## 2022-09-09 RX ADMIN — FAMOTIDINE 20 MG: 10 INJECTION, SOLUTION INTRAVENOUS at 09:27

## 2022-09-09 RX ADMIN — PHENYLEPHRINE HYDROCHLORIDE 100 MCG: 10 INJECTION, SOLUTION INTRAVENOUS at 10:32

## 2022-09-09 RX ADMIN — MEPIVACAINE HYDROCHLORIDE 5 ML: 20 INJECTION, SOLUTION EPIDURAL; INFILTRATION at 09:37

## 2022-09-09 RX ADMIN — LOSARTAN POTASSIUM 100 MG: 100 TABLET, FILM COATED ORAL at 21:46

## 2022-09-09 RX ADMIN — SODIUM CHLORIDE, POTASSIUM CHLORIDE, SODIUM LACTATE AND CALCIUM CHLORIDE 75 ML/HR: 600; 310; 30; 20 INJECTION, SOLUTION INTRAVENOUS at 16:41

## 2022-09-09 RX ADMIN — MIDAZOLAM HYDROCHLORIDE 0.5 MG: 1 INJECTION, SOLUTION INTRAMUSCULAR; INTRAVENOUS at 09:29

## 2022-09-09 RX ADMIN — GLYCOPYRROLATE 0.2 MG: 0.2 INJECTION INTRAMUSCULAR; INTRAVENOUS at 10:44

## 2022-09-09 RX ADMIN — PROPOFOL 20 MG: 10 INJECTION, EMULSION INTRAVENOUS at 11:13

## 2022-09-09 RX ADMIN — EPHEDRINE SULFATE 5 MG: 50 INJECTION INTRAVENOUS at 10:44

## 2022-09-09 RX ADMIN — EPHEDRINE SULFATE 5 MG: 50 INJECTION INTRAVENOUS at 10:42

## 2022-09-09 RX ADMIN — DEXAMETHASONE SODIUM PHOSPHATE 4 MG: 4 INJECTION, SOLUTION INTRAMUSCULAR; INTRAVENOUS at 09:37

## 2022-09-09 RX ADMIN — HYDROCODONE BITARTRATE AND ACETAMINOPHEN 1 TABLET: 7.5; 325 TABLET ORAL at 13:30

## 2022-09-09 RX ADMIN — PHENYLEPHRINE HYDROCHLORIDE 100 MCG: 10 INJECTION, SOLUTION INTRAVENOUS at 10:39

## 2022-09-09 RX ADMIN — GLYCOPYRROLATE 0.5 MG: 0.2 INJECTION INTRAMUSCULAR; INTRAVENOUS at 11:57

## 2022-09-09 RX ADMIN — ROPIVACAINE HYDROCHLORIDE 20 ML: 5 INJECTION EPIDURAL; INFILTRATION; PERINEURAL at 09:37

## 2022-09-09 RX ADMIN — PROPOFOL 100 MG: 10 INJECTION, EMULSION INTRAVENOUS at 10:23

## 2022-09-09 RX ADMIN — ATENOLOL 50 MG: 50 TABLET ORAL at 21:46

## 2022-09-09 RX ADMIN — SODIUM CHLORIDE, POTASSIUM CHLORIDE, SODIUM LACTATE AND CALCIUM CHLORIDE: 600; 310; 30; 20 INJECTION, SOLUTION INTRAVENOUS at 10:25

## 2022-09-09 RX ADMIN — ROCURONIUM BROMIDE 30 MG: 50 INJECTION INTRAVENOUS at 10:25

## 2022-09-09 RX ADMIN — FENTANYL CITRATE 25 MCG: 50 INJECTION INTRAMUSCULAR; INTRAVENOUS at 10:22

## 2022-09-09 RX ADMIN — DEXAMETHASONE SODIUM PHOSPHATE 4 MG: 4 INJECTION, SOLUTION INTRAMUSCULAR; INTRAVENOUS at 10:30

## 2022-09-09 RX ADMIN — VANCOMYCIN HYDROCHLORIDE 1250 MG: 10 INJECTION, POWDER, LYOPHILIZED, FOR SOLUTION INTRAVENOUS at 09:15

## 2022-09-09 RX ADMIN — LIDOCAINE HYDROCHLORIDE 40 MG: 20 INJECTION, SOLUTION INFILTRATION; PERINEURAL at 10:23

## 2022-09-09 RX ADMIN — ONDANSETRON 4 MG: 2 INJECTION INTRAMUSCULAR; INTRAVENOUS at 11:51

## 2022-09-09 RX ADMIN — NEOSTIGMINE METHYLSULFATE 2.5 MG: 0.5 INJECTION INTRAVENOUS at 11:57

## 2022-09-09 RX ADMIN — POVIDONE-IODINE 1 APPLICATION: 10 SOLUTION TOPICAL at 09:18

## 2022-09-09 RX ADMIN — PREGABALIN 150 MG: 75 CAPSULE ORAL at 09:08

## 2022-09-09 NOTE — THERAPY EVALUATION
Patient Name: Violetta Starr  : 1940    MRN: [id number]                              Today's Date: 2022       Admit Date: 2022    Visit Dx:     ICD-10-CM ICD-9-CM   1. Primary osteoarthritis of right knee  M17.11 715.16     Patient Active Problem List   Diagnosis   • Bilateral chronic knee pain   • Primary osteoarthritis of right knee   • Primary osteoarthritis of left knee   • History of malignant neoplasm of endometrium   • Essential hypertension   • Endometrial cancer (HCC)   • Anxiety   • S/P TKR (total knee replacement), left   • GERD (gastroesophageal reflux disease)   • Soft tissue swelling   • Hip pain   • Right-sided low back pain with bilateral sciatica     Past Medical History:   Diagnosis Date   • Anxiety    • DDD (degenerative disc disease), lumbar    • Endometrial cancer (HCC)     Status post surgery and radiation   • Essential hypertension 2016    MANAGED BY PCP   • GERD (gastroesophageal reflux disease)    • Insomnia    • Lab test positive for detection of COVID-19 virus 2022   • Right knee pain     WEAKNESS AND LIMITED MOBILITY   • Skin abnormalities     AREA OF SKIN REMOVED ON FOREHEAD ABOUT 3 WEEKS AGO-BAND AID OVER TO PROTECT SCAB   • Spinal headache    • Urinary incontinence     WEARS PAD     Past Surgical History:   Procedure Laterality Date   • BLADDER REPAIR     • CATARACT EXTRACTION Bilateral    • HYSTERECTOMY Bilateral     OOPHORECTOMY   • LUMBAR LAMINECTOMY Right 2022    Procedure: MINIMALLY INVASIVE LUMBAR LAMINECTOMY, right approach, lumbar 4-lumbar 5;  Surgeon: Carmine Mchugh MD;  Location: [facility];  Service: Neurosurgery;  Laterality: Right;   • REPLACEMENT TOTAL KNEE Left       General Information     Row Name 22 1555          Physical Therapy Time and Intention    Document Type evaluation  -CS     Mode of Treatment individual therapy;physical therapy  -CS     Row Name 22 1555          General Information    Patient Profile Reviewed  yes  -CS     Prior Level of Function independent:;all household mobility;gait;transfer;bed mobility  did not use AD but owns RW  -CS     Existing Precautions/Restrictions fall;right;weight bearing;other (see comments)  R LE WBAT  -CS     Barriers to Rehab none identified  -CS     Row Name 09/09/22 1555          Living Environment    People in Home spouse  -CS     Row Name 09/09/22 1555          Home Main Entrance    Number of Stairs, Main Entrance none  -CS     Row Name 09/09/22 1555          Stairs Within Home, Primary    Number of Stairs, Within Home, Primary none  -CS     Row Name 09/09/22 1555          Cognition    Orientation Status (Cognition) oriented x 3  -CS     Row Name 09/09/22 1555          Safety Issues, Functional Mobility    Impairments Affecting Function (Mobility) endurance/activity tolerance;pain;range of motion (ROM);strength  -CS           User Key  (r) = Recorded By, (t) = Taken By, (c) = Cosigned By    Initials Name Provider Type    CS Sushma Givens PT Physical Therapist               Mobility     Row Name 09/09/22 1556          Bed Mobility    Bed Mobility supine-sit;sit-supine  -CS     Supine-Sit Burns (Bed Mobility) minimum assist (75% patient effort);verbal cues;nonverbal cues (demo/gesture)  -CS     Sit-Supine Burns (Bed Mobility) minimum assist (75% patient effort);verbal cues;nonverbal cues (demo/gesture)  -CS     Assistive Device (Bed Mobility) bed rails;head of bed elevated  -CS     Comment, (Bed Mobility) required assist with R LE; increased time to perform  -CS     Row Name 09/09/22 1556          Sit-Stand Transfer    Sit-Stand Burns (Transfers) minimum assist (75% patient effort);verbal cues  -CS     Assistive Device (Sit-Stand Transfers) walker, front-wheeled  -CS     Row Name 09/09/22 1556          Gait/Stairs (Locomotion)    [place] Level (Gait) contact guard;verbal cues  -CS     Assistive Device (Gait) walker, front-wheeled  -CS     Distance in Feet  (Gait) 30'  -     Deviations/Abnormal Patterns (Gait) prabha decreased;gait speed decreased;stride length decreased  -     Row Name 09/09/22 1556          Mobility    Extremity Weight-bearing Status right lower extremity  -     Right Lower Extremity (Weight-bearing Status) weight-bearing as tolerated (WBAT)  -           User Key  (r) = Recorded By, (t) = Taken By, (c) = Cosigned By    Initials Name Provider Type    CS Sushma Givens, PT Physical Therapist               Obj/Interventions     [facility] Name 09/09/22 1557          Range of Motion Comprehensive    General Range of Motion bilateral lower extremity ROM WFL  -     Comment, General Range of Motion R LE limited secondary to post-op  -CS     [facility] Name 09/09/22 1557          Strength Comprehensive (MMT)    General Manual Muscle Testing (MMT) Assessment no strength deficits identified  -     Comment, General Manual Muscle Testing (MMT) Assessment R LE limited secondary to post-op  -CS     [facility] Name 09/09/22 1557          Motor Skills    Therapeutic Exercise --  10 reps R TKA protocol  -CS     Row Name 09/09/22 1557          Balance    Balance Assessment sitting static balance;standing static balance;standing dynamic balance  -CS     Static Sitting Balance standby assist  -CS     Position, Sitting Balance unsupported;sitting edge of bed  -CS     Static Standing Balance contact guard  -CS     Dynamic Standing Balance contact guard  -CS     Position/Device Used, Standing Balance supported;walker, front-wheeled  -           User Key  (r) = Recorded By, (t) = Taken By, (c) = Cosigned By    Initials Name Provider Type    CS Sushma Givens, PT Physical Therapist               Goals/Plan     Row Name 09/09/22 1601          Bed Mobility Goal 1 (PT)    Activity/Assistive Device (Bed Mobility Goal 1, PT) sit to supine;supine to sit  -     [place] Level/Cues Needed (Bed Mobility Goal 1, PT) standby assist  -CS     Time Frame (Bed Mobility Goal 1, PT) 1 week   -     Row Name 09/09/22 1601          Transfer Goal 1 (PT)    Activity/Assistive Device (Transfer Goal 1, PT) sit-to-stand/stand-to-sit  -CS     [place] Level/Cues Needed (Transfer Goal 1, PT) standby assist  -CS     Time Frame (Transfer Goal 1, PT) 1 week  -CS     Row Name 09/09/22 1601          Gait Training Goal 1 (PT)    Activity/Assistive Device (Gait Training Goal 1, PT) gait (walking locomotion);assistive device use  -CS     [place] Level (Gait Training Goal 1, PT) standby assist;contact guard required  -CS     Distance (Gait Training Goal 1, PT) 100'  -CS     Time Frame (Gait Training Goal 1, PT) 1 week  -CS           User Key  (r) = Recorded By, (t) = Taken By, (c) = Cosigned By    Initials Name Provider Type    CS Sushma Givens, PT Physical Therapist               Clinical Impression     Row Name 09/09/22 1558          Pain    Pretreatment Pain Rating 4/10  -CS     Posttreatment Pain Rating 4/10  -CS     Pain Intervention(s) Rest;Cold applied;Repositioned;Ambulation/increased activity  -     Row Name 09/09/22 1559          Plan of Care Review    Plan of Care Reviewed With patient  -CS     Outcome Evaluation Pt is a 81 y/o F POD 0 s/p R TKA. Pt reports she was I with mobility prior to admission, lives with spouse with no HECTOR. Pt states she owns RW. Pt received in bed upon arrival and agreeable to PT eval. Pt presents to PT with post-op pain, limited ROM, and decreased strength. Pt completed TKA protocol x 10 prior to functional mobility. Pt performed bed mobility transfers this visit requiring min A for R LE. Pt stood and ambulated 30' c RW requiring CGA. Pt reports slight dizziness that improved with ambulation. Pt returned to bed with all needs in reach. PT recommends home with assist and HHPT. Pt will benefit from skilled PT to address strength, ROM, and functional mobility.  -     Row Name 09/09/22 1554          Therapy Assessment/Plan (PT)    Patient/Family Therapy Goals Statement  (PT) to return home  -CS     Rehab Potential (PT) good, to achieve stated therapy goals  -CS     Criteria for Skilled Interventions Met (PT) yes;meets criteria  -CS     Therapy Frequency (PT) daily  -CS     Row Name 09/09/22 1558          Positioning and Restraints    Pre-Treatment Position in bed  -CS     Post Treatment Position bed  -CS     In Bed notified nsg;supine;call light within reach;encouraged to call for assist;exit alarm on;with family/caregiver  -CS           User Key  (r) = Recorded By, (t) = Taken By, (c) = Cosigned By    Initials Name Provider Type    Sushma Gore, PT Physical Therapist               Outcome Measures     Row Name 09/09/22 1602          How much help from another person do you currently need...    Turning from your back to your side while in flat bed without using bedrails? 3  -CS     Moving from lying on back to sitting on the side of a flat bed without bedrails? 3  -CS     Moving to and from a bed to a chair (including a wheelchair)? 3  -CS     Standing up from a chair using your arms (e.g., wheelchair, bedside chair)? 3  -CS     Climbing 3-5 steps with a railing? 2  -CS     To walk in hospital room? 3  -CS     AM-PAC 6 Clicks Score (PT) 17  -CS     Highest level of mobility 5 --> Static standing  -CS     Row Name 09/09/22 1602          Functional Assessment    Outcome Measure Options AM-PAC 6 Clicks Basic Mobility (PT)  -CS           User Key  (r) = Recorded By, (t) = Taken By, (c) = Cosigned By    Initials Name Provider Type    Sushma Gore, CHANTAL Physical Therapist                             Physical Therapy Education                 Title: PT OT SLP Therapies (Done)     Topic: Physical Therapy (Done)     Point: Mobility training (Done)     Learning Progress Summary           Patient Acceptance, E,TB, VU,DU by CS at 9/9/2022 1602   Family Acceptance, E,TB, VU,DU by SEGUNDO at 9/9/2022 1602                   Point: Home exercise program (Done)     Learning Progress Summary            Patient Acceptance, E,TB, VU,DU by  at 9/9/2022 1602   Family Acceptance, E,TB, VU,DU by CS at 9/9/2022 1602                   Point: Body mechanics (Done)     Learning Progress Summary           Patient Acceptance, E,TB, VU,DU by  at 9/9/2022 1602   Family Acceptance, E,TB, VU,DU by CS at 9/9/2022 1602                   Point: Precautions (Done)     Learning Progress Summary           Patient Acceptance, E,TB, VU,DU by  at 9/9/2022 1602   Family Acceptance, E,TB, VU,DU by  at 9/9/2022 1602                               User Key     Initials Effective Dates Name Provider Type Discipline     07/25/22 -  Sushma Givens, PT Physical Therapist PT              PT Recommendation and Plan     Plan of Care Reviewed With: patient  Outcome Evaluation: Pt is a 83 y/o F POD 0 s/p R TKA. Pt reports she was I with mobility prior to admission, lives with spouse with no HECTOR. Pt states she owns RW. Pt received in bed upon arrival and agreeable to PT eval. Pt presents to PT with post-op pain, limited ROM, and decreased strength. Pt completed TKA protocol x 10 prior to functional mobility. Pt performed bed mobility transfers this visit requiring min A for R LE. Pt stood and ambulated 30' c RW requiring CGA. Pt reports slight dizziness that improved with ambulation. Pt returned to bed with all needs in reach. PT recommends home with assist and HHPT. Pt will benefit from skilled PT to address strength, ROM, and functional mobility.     Time Calculation:    PT Charges     Row Name 09/09/22 1603             Time Calculation    Start Time 1450  -CS      Stop Time 1506  -CS      Time Calculation (min) 16 min  -CS      PT Received On 09/09/22  -      PT - Next Appointment 09/10/22  -      PT Goal Re-Cert Due Date 09/16/22  -              Time Calculation- PT    Total Timed Code Minutes- PT 15 minute(s)  -CS              Timed Charges    12783 - PT Therapeutic Activity Minutes 15  -CS              Total Minutes     Timed Charges Total Minutes 15  -CS       Total Minutes 15  -CS            User Key  (r) = Recorded By, (t) = Taken By, (c) = Cosigned By    Initials Name Provider Type    CS Carol Givens, PT Physical Therapist              Therapy Charges for Today     Code Description Service Date Service Provider Modifiers Qty    43902403480 HC PT THERAPEUTIC ACT EA 15 MIN 9/9/2022 Carol Givens, PT GP 1    86839585003 HC PT EVAL LOW COMPLEXITY 2 9/9/2022 Carol Givens, PT GP 1          PT G-Codes  Outcome Measure Options: AM-PAC 6 Clicks Basic Mobility (PT)  AM-PAC 6 Clicks Score (PT): 17    CAROL GIVENS PT  9/9/2022

## 2022-09-09 NOTE — DISCHARGE PLACEMENT REQUEST
"Matias Starr (82 y.o. Female)             Date of Birth   1940    Social Security Number       Address   [address] [place] [address]    Home Phone   [phone]    MRN   [id number]       [facility]    Marital Status                               Admission Date   9/9/22    Admission Type   Elective    Admitting Provider   Charan Payne MD    Attending Provider   Charan Payne MD    Department, Room/Bed   [address], P887/1       Discharge Date       Discharge Disposition       Discharge Destination                               Attending Provider: Charan Payne MD    Allergies: Amlodipine-olmesartan, Benazepril    Isolation: None   Infection: COVID Screen (preop/placement) (08/12/22)   Code Status: CPR   Advance Care Planning Activity    Ht: 163.8 cm (64.5\")   Wt: 80.7 kg (177 lb 14.6 oz)    Admission Cmt: None   Principal Problem: Primary osteoarthritis of right knee [M17.11]                 Active Insurance as of 9/9/2022     Primary Coverage     Payor Plan Insurance Group Employer/Plan Group    HUMANA MEDICARE REPLACEMENT HU[employer]NA MEDICARE REPLACEMENT [id number]     Payor Plan Address Payor Plan Phone Number Payor Plan Fax Number Effective Dates    [address] [address] [phone]  1/1/2018 - None Entered    [facility] [id number]       Subscriber Name Subscriber Birth Date Member ID       MATIAS STARR 1940 [id number]                 Emergency Contacts      (Rel.) Home Phone Work Phone Mobile Phone    SUMMERSOLEDAD (Spouse) [phone] -- [phone]    SPENSERDOE (Daughter) -- -- [phone]              "

## 2022-09-09 NOTE — PLAN OF CARE
Goal Outcome Evaluation:  Plan of Care Reviewed With: patient           Outcome Evaluation: Pt is a 83 y/o F POD 0 s/p R TKA. Pt reports she was I with mobility prior to admission, lives with spouse with no HECTOR. Pt states she owns RW. Pt received in bed upon arrival and agreeable to PT eval. Pt presents to PT with post-op pain, limited ROM, and decreased strength. Pt completed TKA protocol x 10 prior to functional mobility. Pt performed bed mobility transfers this visit requiring min A for R LE. Pt stood and ambulated 30' c RW requiring CGA. Pt reports slight dizziness that improved with ambulation. Pt returned to bed with all needs in reach. PT recommends home with assist and HHPT. Pt will benefit from skilled PT to address strength, ROM, and functional mobility.

## 2022-09-09 NOTE — H&P
History & Physical       Patient: Violetta Starr    Date of Admission: 9/9/2022  6:45 AM    YOB: 1940    Medical Record Number: [id number]    Attending Physician: Charan Payne MD        Chief Complaints: Primary osteoarthritis of right knee [M17.11]      History of Present Illness: 82 y.o. female presents with Primary osteoarthritis of right knee [M17.11]. Onset of symptoms was gradual and slowly progressive over the past 3 to 4 years.  Symptoms are associated with pain and discomfort over the medial aspect of the right knee.  Symptoms are aggravated by deep flexion of the knee.   Symptoms improve with anti-inflammatory medication and intra-articular injections of steroid. Patient is now being admitted to the services of Charan Payne MD for further evaluation and treatment.        Allergies   Allergen Reactions   • Amlodipine-Olmesartan Itching   • Benazepril Rash         Home Medications:  Medications Prior to Admission   Medication Sig Dispense Refill Last Dose   • acetaminophen (TYLENOL) 500 MG tablet Take 1,000 mg by mouth Every 6 (Six) Hours As Needed for Mild Pain .   Past Week at Unknown time   • amLODIPine (NORVASC) 10 MG tablet Take 1 tablet by mouth Every Night. 90 tablet 3 9/8/2022 at 1800   • atenolol (TENORMIN) 50 MG tablet Take 1 tablet by mouth Every Night. 90 tablet 3 9/8/2022 at 1800   • bismuth subsalicylate (PEPTO BISMOL) 262 MG/15ML suspension Take 15 mL by mouth Every 6 (Six) Hours As Needed for Indigestion.      • cetirizine (zyrTEC) 10 MG tablet Take 10 mg by mouth As Needed for Allergies.   9/8/2022 at 0900   • Chlorhexidine Gluconate 2 % pads Apply  topically. AS DIRECTED PAT   9/9/2022 at Unknown time   • LORazepam (ATIVAN) 0.5 MG tablet Take 1 tablet by mouth 2 (Two) Times a Day As Needed for Anxiety. 30 tablet 0 9/8/2022 at 2130   • LOSARTAN POTASSIUM PO Take 100 mg by mouth Every Night.   9/8/2022 at 1800   • NAPROXEN DR PO Take 500 mg by mouth As Needed. INSTRUCTED  TO HOLD FOR SURGERY   Past Month at Unknown time   • hydrALAZINE (APRESOLINE) 10 MG tablet Take 1 tablet by mouth twice a day as needed for systolic blood pressure greater than 160. (Patient taking differently: As Needed. Take 1 tablet by mouth twice a day as needed for systolic blood pressure greater than 160.  HAS NOT TAKEN FOR OVER A YEAR) 90 tablet 3 More than a month at Unknown time   • omeprazole (priLOSEC) 40 MG capsule Take 1 capsule by mouth Daily. (Patient taking differently: Take 40 mg by mouth Every Night.) 90 capsule 3 More than a month at Unknown time       Current Medications:  Scheduled Meds:acetaminophen, 1,000 mg, Oral, Once  ceFAZolin, 2 g, Intravenous, Once  famotidine, 20 mg, Intravenous, Once  lactated ringers, 500 mL, Intravenous, Once  meloxicam, 15 mg, Oral, Once  povidone-iodine, 1 application, Nasal, Once  pregabalin, 150 mg, Oral, Once  sodium chloride, 3 mL, Intravenous, Q12H  vancomycin, 15 mg/kg, Intravenous, Once      Continuous Infusions:lactated ringers, 9 mL/hr      PRN Meds:.fentanyl  •  lidocaine PF 1%  •  midazolam  •  sodium chloride       Past Medical History:   Diagnosis Date   • Anxiety    • DDD (degenerative disc disease), lumbar    • Endometrial cancer (HCC)     Status post surgery and radiation   • Essential hypertension 05/26/2016    MANAGED BY PCP   • GERD (gastroesophageal reflux disease)    • Insomnia    • Lab test positive for detection of COVID-19 virus 07/2022   • Right knee pain     WEAKNESS AND LIMITED MOBILITY   • Skin abnormalities     AREA OF SKIN REMOVED ON FOREHEAD ABOUT 3 WEEKS AGO-BAND AID OVER TO PROTECT SCAB   • Spinal headache    • Urinary incontinence     WEARS PAD        Past Surgical History:   Procedure Laterality Date   • BLADDER REPAIR     • CATARACT EXTRACTION Bilateral    • HYSTERECTOMY Bilateral     OOPHORECTOMY   • LUMBAR LAMINECTOMY Right 05/20/2022    Procedure: MINIMALLY INVASIVE LUMBAR LAMINECTOMY, right approach, lumbar 4-lumbar 5;   Surgeon: Carmine Mchugh MD;  Location: [facility] MAIN OR;  Service: Neurosurgery;  Laterality: Right;   • REPLACEMENT TOTAL KNEE Left         Social History     Occupational History   • Occupation: Retired   Tobacco Use   • Smoking status: Never Smoker   • Smokeless tobacco: Never Used   Vaping Use   • Vaping Use: Never used   Substance and Sexual Activity   • Alcohol use: Never   • Drug use: Never   • Sexual activity: Defer      Social History     Social History Narrative   • Not on file        Family History   Problem Relation Age of Onset   • Breast cancer Mother    • Stroke Father    • Malig Hyperthermia Neg Hx          Review of Systems:   HEENT: Patient denies any headaches, vision changes, change in hearing, or tinnitus, Patient denies any rhinorrhea,epistaxis, sinus pain, mouth or dental problems, sore throat or hoarseness, or dysphagia  Pulmonary: Patient denies any cough, congestion, SOA, or wheezing  Cardiovascular: Patient denies any chest pain, dyspnea, palpitations, weakness, intolerance of exercise, varicosities, swelling of extremities, known murmur  Gastrointestinal:  Patient denies nausea, vomiting, diarrhea, constipation, loss  of appetite, change in appetite, dysphagia, gas, heartburn, melena, change in bowel habits, use of laxatives or other drugs to alter the function of the gastrointestinal tract.  Genital/Urinary: Patient denies dysuria, change in color of urine, change in frequency of urination, pain with urgency, incontinence, retention, or nocturia.  Musculoskeletal: Patient denies increased warmth; redness; or swelling of joints; limitation of function; deformity; crepitation: pain in a joint or an extremity, the neck, or the back, especially with movement.  Neurological: Patient denies dizziness, tremor, ataxia, difficulty in speaking, change in speech, paresthesia, loss of sensation, seizures, syncope, changes in memory.  Endocrine system: Patient denies tremors, palpitations, intolerance  of heat or cold, polyuria, polydipsia, polyphagia, diaphoresis, exophthalmos, or goiter.  Psychological: Patient denies thoughts/plans or harming self or other; depression,  insomnia, night terrors, butch, memory loss, disorientation.  Skin: Patient denies any bruising, rashes, discoloration, pruritus, wounds, ulcers, decubiti, changes in the hair or nails  Hematopoietic: Patient denies history of spontaneous or excessive bleeding, epistaxis, hematuria, melena, fatigue, enlarged or tender lymph nodes, pallor, history of anemia.    Physical Exam: 82 y.o. female  Vitals:    09/09/22 0857   BP: 145/75   Pulse: 67   Resp: 18   Temp: 97.9 °F (36.6 °C)   TempSrc: Oral   SpO2: 97%   Weight: 80.7 kg (177 lb 14.6 oz)       General Appearance:          Alert, cooperative, in no acute distress                                                 Head:    Normocephalic, without obvious abnormality, atraumatic   Eyes:            Lids and lashes normal, conjunctivae and sclerae normal, no   icterus, no pallor, corneas clear, PERRLA   Ears:    Ears appear intact with no abnormalities noted   Throat:   No oral lesions, no thrush, oral mucosa moist   Neck:   No adenopathy, supple, trachea midline, no thyromegaly, no   carotid bruit, no JVD   Back:     No kyphosis present, no scoliosis present, no skin lesions,      erythema or scars, no tenderness to percussion or                   palpation,   range of motion normal   Lungs:     Clear to auscultation,respirations regular, even and                  unlabored    Heart:    Regular rhythm and normal rate, normal S1 and S2, no            murmur, no gallop, no rub, no click   Chest Wall:    No abnormalities observed   Abdomen:     Normal bowel sounds, no masses, no organomegaly, soft        nontender, nondistended, no guarding, no rebound                tenderness   Rectal:     Deferred   Extremities:   Tenderness over medial aspect of the right knee. Moves all extremities well, no edema,    no cyanosis, no redness   Pulses:   Pulses palpable and equal bilaterally   Skin:   No bleeding, bruising or rash   Lymph nodes:   No palpable adenopathy   Neurologic:   Cranial nerves 2 - 12 grossly intact, sensation intact, DTR       present and equal bilaterally   Right knee. Patient has crepitus throughout range of motion. Positive patellar grind test. Mild effusion. Lachman is negative. Pivot shift is negative. Anterior and posterior drawer signs are negative. Significant joint line tenderness is noted on the medial aspect of the knee. Patient has a varus orientation of the knee. There is fullness and tenderness in the popliteal fossa. Mild distention of a popliteal cyst is noted in this location. Range of motion in flexion is from 0- 110 degrees. Neurovascular status is intact.  Dorsalis pedis and posterior tibial artery pulses are palpable. Common peroneal nerve function is well preserved. Patient's gait is cautious and antalgic. Skin and soft tissues are mildly swollen, consistent with synovitis and effusion. The patient has a significant limp with the first few steps after starting the gait cycle. Getting out of a chair takes a lot of effort due to pain on knee flexion.        Diagnostic Tests:  No visits with results within 2 Day(s) from this visit.   Latest known visit with results is:   Pre-Admission Testing on 09/07/2022   Component Date Value Ref Range Status   • Glucose 09/07/2022 97  65 - 99 mg/dL Final   • BUN 09/07/2022 15  8 - 23 mg/dL Final   • Creatinine 09/07/2022 0.99  0.57 - 1.00 mg/dL Final   • Sodium 09/07/2022 141  136 - 145 mmol/L Final   • Potassium 09/07/2022 4.1  3.5 - 5.2 mmol/L Final   • Chloride 09/07/2022 104  98 - 107 mmol/L Final   • CO2 09/07/2022 26.0  22.0 - 29.0 mmol/L Final   • Calcium 09/07/2022 9.7  8.6 - 10.5 mg/dL Final   • BUN/Creatinine Ratio 09/07/2022 15.2  7.0 - 25.0 Final   • Anion Gap 09/07/2022 11.0  5.0 - 15.0 mmol/L Final   • eGFR 09/07/2022 56.3 (A) >60.0  mL/min/1.73 Final    National Kidney Foundation and American Society of Nephrology (ASN) Task Force recommended calculation based on the Chronic Kidney Disease Epidemiology Collaboration (CKD-EPI) equation refit without adjustment for race.   • WBC 09/07/2022 4.68  3.40 - 10.80 10*3/mm3 Final   • RBC 09/07/2022 3.59 (A) 3.77 - 5.28 10*6/mm3 Final   • Hemoglobin 09/07/2022 11.0 (A) 12.0 - 15.9 g/dL Final   • Hematocrit 09/07/2022 33.8 (A) 34.0 - 46.6 % Final   • MCV 09/07/2022 94.2  79.0 - 97.0 fL Final   • MCH 09/07/2022 30.6  26.6 - 33.0 pg Final   • MCHC 09/07/2022 32.5  31.5 - 35.7 g/dL Final   • RDW 09/07/2022 12.9  12.3 - 15.4 % Final   • RDW-SD 09/07/2022 44.9  37.0 - 54.0 fl Final   • MPV 09/07/2022 10.7  6.0 - 12.0 fL Final   • Platelets 09/07/2022 255  140 - 450 10*3/mm3 Final   • QT Interval 09/07/2022 407  ms Final   • ABO Type 09/07/2022 A   Final   • RH type 09/07/2022 Positive   Final   • Antibody Screen 09/07/2022 Negative   Final   • T&S Expiration Date 09/07/2022 9/21/2022 11:59:00 PM   Final   • Hemoglobin A1C 09/07/2022 5.80 (A) 4.80 - 5.60 % Final   • Color, UA 09/07/2022 Dark Yellow (A) Yellow, Straw Final   • Appearance, UA 09/07/2022 Clear  Clear Final   • pH, UA 09/07/2022 5.5  5.0 - 8.0 Final   • Specific Gravity, UA 09/07/2022 1.027  1.005 - 1.030 Final   • Glucose, UA 09/07/2022 Negative  Negative Final   • Ketones, UA 09/07/2022 Trace (A) Negative Final   • Bilirubin, UA 09/07/2022 Negative  Negative Final   • Blood, UA 09/07/2022 Negative  Negative Final   • Protein, UA 09/07/2022 Negative  Negative Final   • Leuk Esterase, UA 09/07/2022 Negative  Negative Final   • Nitrite, UA 09/07/2022 Negative  Negative Final   • Urobilinogen, UA 09/07/2022 1.0 E.U./dL  0.2 - 1.0 E.U./dL Final     No results found.      Assessment:  Patient Active Problem List   Diagnosis   • Bilateral chronic knee pain   • Primary osteoarthritis of right knee   • Primary osteoarthritis of left knee   • History of  malignant neoplasm of endometrium   • Essential hypertension   • Endometrial cancer (HCC)   • Anxiety   • S/P TKR (total knee replacement), left   • GERD (gastroesophageal reflux disease)   • Soft tissue swelling   • Hip pain   • Right-sided low back pain with bilateral sciatica         Plan:  The patient voiced understanding of the risks, benefits, and alternative forms of treatment that were discussed and the patient consents to proceed with right total knee arthroplasty with robotic arm assistance.     The patient was seen today for preoperative discussion.  The patient has been tried on over-the-counter and prescription NSAID's despite the risks of anti-inflammatory bleeding, peptic ulcers and erosive gastritis with short term benefit only.  Braces have been prescribed for mechanical support.  Patient has been participating in an exercise program specifically targeting joint pain relief with limited benefit. Intraarticular injections have been used periodically with some but not complete relief of pain.  Ambulation aids have also been utilized.      The details of the surgical procedure were explained including the location of probable incisions and a description of the likely hardware/grafts to be used. The patient understands the likely convalescence after surgery as well as the rehabilitation required.  Also, we have thoroughly discussed with the patient the risks, benefits and alternatives to surgery.  Risks include but are not limited to the risk of infection, joint stiffness, limited range of motion, wound healing problems, scar tissue build up, myocardial infarction, stroke, blood clots (including DVT and/or pulmonary embolus along with the risk of death) neurologic and/or vascular injury, limb length discrepancy, fracture, dislocation, nonunion, malunion, continued pain and need for further surgery including hardware failure requiring revision.   Discharge Plan: tomorrow to home and home  health      Date: 9/9/2022    Charan Payne MD      DICTATED UTILIZING DRAGON DICTATION

## 2022-09-09 NOTE — ANESTHESIA PROCEDURE NOTES
Peripheral Block      Patient reassessed immediately prior to procedure    Patient location during procedure: holding area  Stop time: 9/9/2022 9:37 AM  Reason for block: at surgeon's request and post-op pain management  Performed by  Anesthesiologist: Bay Johnson MD  Preanesthetic Checklist  Completed: patient identified and risks and benefits discussed  Prep:  Sterile barriers:gloves  Prep: ChloraPrep  Patient monitoring: blood pressure monitoring, continuous pulse oximetry and EKG  Procedure    Sedation: yes    Guidance:ultrasound guided    ULTRASOUND INTERPRETATION.  Using ultrasound guidance a 21 G gauge needle was placed in close proximity to the femoral nerve, at which point, under ultrasound guidance anesthetic was injected in the area of the nerve and spread of the anesthesia was seen on ultrasound in close proximity thereto.  There were no abnormalities seen on ultrasound; a digital image was taken; and the patient tolerated the procedure with no complications. Images:still images obtained    Laterality:right  Block Type:adductor canal block  Injection Technique:single-shot  Needle Type:short-bevel  Needle Gauge:21 G      Medications Used: ropivacaine (NAROPIN) 0.5 % injection, 20 mL  mepivacaine PF (CARBOCAINE) 2 % injection, 5 mL  dexamethasone (DECADRON) injection, 4 mg  Med administered at 9/9/2022 9:37 AM      Medications  Comment:      Post Assessment  Injection Assessment: negative aspiration for heme, no paresthesia on injection and incremental injection  Patient Tolerance:comfortable throughout block  Complications:no  Additional Notes  Ultrasound Interpretation:  Using ultrasound guidance the needle was placed in close proximity to the femoral nerve and anesthetic was injected in the area of the femoral nerve and spread of the anesthetic was seen on ultrasound in close proximity thereto.  There were no abnormalities seen on ultrasound; a digital image was taken; and the patient tolerated the  procedure with no complications.    Block placed for postoperative pain control per surgeon request.

## 2022-09-09 NOTE — ANESTHESIA PREPROCEDURE EVALUATION
Anesthesia Evaluation     NPO Solid Status: > 8 hours             Airway   Mallampati: III  Dental      Pulmonary    (-) asthma, sleep apnea, not a smoker    ROS comment: Negative patient screen for ALEJANDRO    Cardiovascular     (+) hypertension,   (-) angina, LEIGH      Neuro/Psych  (+) headaches, psychiatric history Anxiety,    GI/Hepatic/Renal/Endo    (+) obesity,  GERD,      Musculoskeletal     Abdominal    Substance History      OB/GYN          Other   arthritis,                      Anesthesia Plan    ASA 3     general     (Block placed for postoperative pain control per surgeon request.  )    Anesthetic plan, risks, benefits, and alternatives have been provided, discussed and informed consent has been obtained with: patient.        CODE STATUS:

## 2022-09-09 NOTE — ANESTHESIA PROCEDURE NOTES
Airway  Urgency: elective    Date/Time: 9/9/2022 10:27 AM    General Information and Staff    Patient location during procedure: OR  Anesthesiologist: Harsh Cross MD    Indications and Patient Condition  Indications for airway management: airway protection    Preoxygenated: yes  MILS maintained throughout  Mask difficulty assessment: 1 - vent by mask    Final Airway Details  Final airway type: endotracheal airway      Successful airway: ETT  Cuffed: yes   Successful intubation technique: direct laryngoscopy  Endotracheal tube insertion site: oral  Blade: Leo  Blade size: 3  ETT size (mm): 7.0  Cormack-Lehane Classification: grade IIa - partial view of glottis  Placement verified by: capnometry   Measured from: teeth  ETT/EBT  to teeth (cm): 20  Number of attempts at approach: 1  Assessment: lips, teeth, and gum same as pre-op and atraumatic intubation

## 2022-09-09 NOTE — ANESTHESIA POSTPROCEDURE EVALUATION
Patient: Violetta Starr    Procedure Summary     Date: 09/09/22 Room / [facility]:  [facility]  [facility]    Anesthesia Start: 1018 Anesthesia Stop: 1239    Procedure: Right total knee arthroplasty with robotics (Right Knee) Diagnosis:       Primary osteoarthritis of right knee      (Primary osteoarthritis of right knee [M17.11])    Surgeons: Charan Payne MD Provider: Harsh Cross MD    Anesthesia Type: general ASA Status: 3          Anesthesia Type: general    Vitals  Vitals Value Taken Time   /63 09/09/22 1330   Temp     Pulse 68 09/09/22 1338   Resp 18 09/09/22 1315   SpO2 96 % 09/09/22 1338   Vitals shown include unvalidated device data.        Post Anesthesia Care and Evaluation    Patient location during evaluation: bedside  Pain management: adequate    Airway patency: patent  Anesthetic complications: No anesthetic complications    Cardiovascular status: acceptable  Respiratory status: acceptable  Hydration status: acceptable    Comments: */62 (BP Location: Right arm, Patient Position: Lying)   Pulse 53   Temp 36.6 °C (97.9 °F) (Oral)   Resp 18   Wt 80.7 kg (177 lb 14.6 oz)   SpO2 100%   BMI 30.07 kg/m²

## 2022-09-09 NOTE — CASE MANAGEMENT/SOCIAL WORK
Discharge Planning Assessment  [facility]     Patient Name: Violetta Starr  MRN: [id number]  Today's Date: 9/9/2022    Admit Date: 9/9/2022     Discharge Needs Assessment     Row Name 09/09/22 1528       Living Environment    People in Home spouse    Name(s) of People in Home Sam    Current Living Arrangements apartment    Primary Care Provided by self    Provides Primary Care For no one    Family Caregiver if Needed spouse    Family Caregiver Names Sam    Quality of Family Relationships helpful;involved;supportive    Able to Return to Prior Arrangements yes       Resource/Environmental Concerns    Resource/Environmental Concerns none    Transportation Concerns none       Transition Planning    Patient/Family Anticipates Transition to home with family;home with help/services    Patient/Family Anticipated Services at Transition home health care    Transportation Anticipated family or friend will provide       Discharge Needs Assessment    Readmission Within the Last 30 Days no previous admission in last 30 days    Equipment Currently Used at Home walker, rolling;rollator    Concerns to be Addressed no discharge needs identified;denies needs/concerns at this time    Anticipated Changes Related to Illness none    Equipment Needed After Discharge none    Outpatient/Agency/Support Group Needs homecare agency    Discharge Facility/Level of Care Needs home with home health    Provided Post Acute Provider List? N/A    N/A Provider List Comment Patient plans to discharge home    Provided Post Acute Provider Quality & Resource List? N/A    N/A Quality & Resource List Comment Patient plans to discharge home    Patient's Choice of Community Agency(s) [facility]               Discharge Plan     Row Name 09/09/22 1529       Plan    Plan Home with  and [facility]    Patient/Family in Agreement with Plan yes    Plan Comments [facility] met with patient and her  Sam with patient's verbal permission. Patient verified  the demographic information on her face sheet is accurate. Patient states she lives with her  Sam. Patient confirmed her PCP is Antoni Gonzalez. [facility] Patient denies a history of SNF. Patient state she has a walker, rollator, and a wheelchair but she does not use the wheelchair her daughter is borrowing right now. Patient denies any stairs to enter her apartment and states there is a chair lift in the apartment to use to go up/down the stairs. Patient requesting [facility]  again, [facility] made referral this date. Pateinpatricia is enrolled in the [facility] M2[facility] program. Patient states her  Sam can transport her home at discharge and Sam agrees to transport patient home at discharge.              Continued Care and Services - Admitted Since 9/9/2022     Home Medical Care     Service Provider Request Status Selected Services Address Phone Fax Patient Preferred    [facility]  Pending - Request Sent N/A [address] [place] [address] [phone] --              Expected Discharge Date and Time     Expected Discharge Date Expected Discharge Time    Sep 10, 2022          Demographic Summary     Row Name 09/09/22 1527       General Information    Admission Type observation    Arrived From PACU/recovery room    Reason for Consult discharge planning    Preferred Language English               Functional Status     Row Name 09/09/22 1527       Functional Status    Usual Activity Tolerance good    Current Activity Tolerance moderate       Functional Status, IADL    Medications assistive equipment    Meal Preparation assistive equipment    Housekeeping assistive equipment    Laundry assistive equipment    Shopping assistive equipment       Mental Status    General Appearance WDL WDL       Mental Status Summary    Recent Changes in Mental Status/Cognitive Functioning no changes       Employment/    Employment Status retired                Psychosocial    No documentation.                Abuse/Neglect    No documentation.                Legal    No documentation.                Substance Abuse    No documentation.                Patient Forms    No documentation.

## 2022-09-10 ENCOUNTER — READMISSION MANAGEMENT (OUTPATIENT)
Dept: CALL CENTER | Facility: HOSPITAL | Age: 82
End: 2022-09-10

## 2022-09-10 VITALS
WEIGHT: 177.91 LBS | RESPIRATION RATE: 16 BRPM | DIASTOLIC BLOOD PRESSURE: 52 MMHG | OXYGEN SATURATION: 98 % | TEMPERATURE: 98.2 F | BODY MASS INDEX: 30.07 KG/M2 | SYSTOLIC BLOOD PRESSURE: 89 MMHG | HEART RATE: 63 BPM

## 2022-09-10 LAB
ANION GAP SERPL CALCULATED.3IONS-SCNC: 12 MMOL/L (ref 5–15)
BUN SERPL-MCNC: 17 MG/DL (ref 8–23)
BUN/CREAT SERPL: 16.3 (ref 7–25)
CALCIUM SPEC-SCNC: 9.2 MG/DL (ref 8.6–10.5)
CHLORIDE SERPL-SCNC: 102 MMOL/L (ref 98–107)
CO2 SERPL-SCNC: 24 MMOL/L (ref 22–29)
CREAT SERPL-MCNC: 1.04 MG/DL (ref 0.57–1)
EGFRCR SERPLBLD CKD-EPI 2021: 53.1 ML/MIN/1.73
GLUCOSE SERPL-MCNC: 141 MG/DL (ref 65–99)
HCT VFR BLD AUTO: 28.5 % (ref 34–46.6)
HGB BLD-MCNC: 9.6 G/DL (ref 12–15.9)
POTASSIUM SERPL-SCNC: 4.8 MMOL/L (ref 3.5–5.2)
SODIUM SERPL-SCNC: 138 MMOL/L (ref 136–145)

## 2022-09-10 PROCEDURE — A9270 NON-COVERED ITEM OR SERVICE: HCPCS | Performed by: ORTHOPAEDIC SURGERY

## 2022-09-10 PROCEDURE — 63710000001 RIVAROXABAN 10 MG TABLET: Performed by: ORTHOPAEDIC SURGERY

## 2022-09-10 PROCEDURE — 25010000002 CEFAZOLIN IN DEXTROSE 2-4 GM/100ML-% SOLUTION: Performed by: ORTHOPAEDIC SURGERY

## 2022-09-10 PROCEDURE — G0378 HOSPITAL OBSERVATION PER HR: HCPCS

## 2022-09-10 PROCEDURE — 63710000001 PANTOPRAZOLE 40 MG TABLET DELAYED-RELEASE: Performed by: ORTHOPAEDIC SURGERY

## 2022-09-10 PROCEDURE — 99024 POSTOP FOLLOW-UP VISIT: CPT | Performed by: ORTHOPAEDIC SURGERY

## 2022-09-10 PROCEDURE — 80048 BASIC METABOLIC PNL TOTAL CA: CPT | Performed by: ORTHOPAEDIC SURGERY

## 2022-09-10 PROCEDURE — 63710000001 POLYETHYLENE GLYCOL 17 G PACK: Performed by: ORTHOPAEDIC SURGERY

## 2022-09-10 PROCEDURE — 97110 THERAPEUTIC EXERCISES: CPT

## 2022-09-10 PROCEDURE — 63710000001 SENNOSIDES-DOCUSATE 8.6-50 MG TABLET: Performed by: ORTHOPAEDIC SURGERY

## 2022-09-10 PROCEDURE — 85018 HEMOGLOBIN: CPT | Performed by: ORTHOPAEDIC SURGERY

## 2022-09-10 PROCEDURE — 85014 HEMATOCRIT: CPT | Performed by: ORTHOPAEDIC SURGERY

## 2022-09-10 RX ORDER — ONDANSETRON 4 MG/1
4 TABLET, FILM COATED ORAL EVERY 6 HOURS PRN
Qty: 10 TABLET | Refills: 0 | Status: SHIPPED | OUTPATIENT
Start: 2022-09-10

## 2022-09-10 RX ORDER — POLYETHYLENE GLYCOL 3350 17 G/17G
17 POWDER, FOR SOLUTION ORAL DAILY
Qty: 238 G | Refills: 0 | Status: SHIPPED | OUTPATIENT
Start: 2022-09-10 | End: 2022-09-20

## 2022-09-10 RX ORDER — AMOXICILLIN 250 MG
1 CAPSULE ORAL 2 TIMES DAILY
Qty: 60 TABLET | Refills: 0 | Status: SHIPPED | OUTPATIENT
Start: 2022-09-10 | End: 2023-02-17

## 2022-09-10 RX ADMIN — POLYETHYLENE GLYCOL 3350 17 G: 17 POWDER, FOR SOLUTION ORAL at 08:31

## 2022-09-10 RX ADMIN — RIVAROXABAN 10 MG: 10 TABLET, FILM COATED ORAL at 08:33

## 2022-09-10 RX ADMIN — DOCUSATE SODIUM 50MG AND SENNOSIDES 8.6MG 1 TABLET: 8.6; 5 TABLET, FILM COATED ORAL at 08:31

## 2022-09-10 RX ADMIN — CEFAZOLIN SODIUM 2 G: 2 INJECTION, SOLUTION INTRAVENOUS at 02:10

## 2022-09-10 RX ADMIN — PANTOPRAZOLE SODIUM 40 MG: 40 TABLET, DELAYED RELEASE ORAL at 06:54

## 2022-09-10 NOTE — OUTREACH NOTE
Prep Survey    Flowsheet Row Responses   [facility] patient discharged from? [place]   Is LACE score < 7 ? Yes   Emergency Room discharge w/ pulse ox? No   Eligibility [facility]   Date of Admission 09/09/22   Date of Discharge 09/10/22   Discharge Disposition Home-Health Care Sv   Discharge diagnosis Right total knee arthroplasty with robotics   Does the patient have one of the following disease processes/diagnoses(primary or secondary)? Total Joint Replacement   Does the patient have Home health ordered? Yes   What is the Home health agency?  [facility]    Is there a DME ordered? No   Prep survey completed? Yes          ZARI DELACRUZ - Registered Nurse

## 2022-09-10 NOTE — THERAPY DISCHARGE NOTE
Patient Name: Violetta Starr  : 1940    MRN: [id number]                              Today's Date: 9/10/2022       Admit Date: 2022    Visit Dx:     ICD-10-CM ICD-9-CM   1. Primary osteoarthritis of right knee  M17.11 715.16     Patient Active Problem List   Diagnosis   • Bilateral chronic knee pain   • Primary osteoarthritis of right knee   • Primary osteoarthritis of left knee   • History of malignant neoplasm of endometrium   • Essential hypertension   • Endometrial cancer (HCC)   • Anxiety   • S/P TKR (total knee replacement), left   • GERD (gastroesophageal reflux disease)   • Soft tissue swelling   • Hip pain   • Right-sided low back pain with bilateral sciatica     Past Medical History:   Diagnosis Date   • Anxiety    • DDD (degenerative disc disease), lumbar    • Endometrial cancer (HCC)     Status post surgery and radiation   • Essential hypertension 2016    MANAGED BY PCP   • GERD (gastroesophageal reflux disease)    • Insomnia    • Lab test positive for detection of COVID-19 virus 2022   • Right knee pain     WEAKNESS AND LIMITED MOBILITY   • Skin abnormalities     AREA OF SKIN REMOVED ON FOREHEAD ABOUT 3 WEEKS AGO-BAND AID OVER TO PROTECT SCAB   • Spinal headache    • Urinary incontinence     WEARS PAD     Past Surgical History:   Procedure Laterality Date   • BLADDER REPAIR     • CATARACT EXTRACTION Bilateral    • HYSTERECTOMY Bilateral     OOPHORECTOMY   • LUMBAR LAMINECTOMY Right 2022    Procedure: MINIMALLY INVASIVE LUMBAR LAMINECTOMY, right approach, lumbar 4-lumbar 5;  Surgeon: Carmine Mchugh MD;  Location: [facility];  Service: Neurosurgery;  Laterality: Right;   • REPLACEMENT TOTAL KNEE Left       General Information     Row Name 09/10/22 0944          Physical Therapy Time and Intention    Document Type discharge treatment  -CB     Mode of Treatment individual therapy;physical therapy  -CB     Row Name 09/10/22 0944          General Information    Existing  Precautions/Restrictions fall  -CB     Row Name 09/10/22 0944          Cognition    Orientation Status (Cognition) oriented x 4  -CB           User Key  (r) = Recorded By, (t) = Taken By, (c) = Cosigned By    Initials Name Provider Type    CB Shreya Flores, PT Physical Therapist               Mobility     Row Name 09/10/22 0945          Bed Mobility    Comment, (Bed Mobility) up in bathroom upon arrival to room  -CB     Row Name 09/10/22 0945          Sit-Stand Transfer    Sit-Stand [place] (Transfers) modified independence;verbal cues  -CB     Assistive Device (Sit-Stand Transfers) walker, front-wheeled  -CB     Comment, (Sit-Stand Transfer) STSx2 reps  -CB     Row Name 09/10/22 0945          Gait/Stairs (Locomotion)    [place] Level (Gait) supervision  -CB     Assistive Device (Gait) walker, front-wheeled  -CB     Distance in Feet (Gait) 175ft  -CB     Deviations/Abnormal Patterns (Gait) prabha decreased;gait speed decreased;stride length decreased  -CB     Comment, (Gait/Stairs) pt ambulated in reciprocal gait pattern, no LOB or unsteadiness noted  -CB     Row Name 09/10/22 0945          Mobility    Extremity Weight-bearing Status right lower extremity  -CB     Right Lower Extremity (Weight-bearing Status) weight-bearing as tolerated (WBAT)  -CB           User Key  (r) = Recorded By, (t) = Taken By, (c) = Cosigned By    Initials Name Provider Type    Shreya Ames PT Physical Therapist               Obj/Interventions     Row Name 09/10/22 0946          Motor Skills    Therapeutic Exercise --  TKA protocol x10 reps  -CB     Row Name 09/10/22 0946          Balance    Balance Assessment standing static balance;standing dynamic balance  -CB     Static Standing Balance modified independence  -CB     Dynamic Standing Balance supervision  -CB     Position/Device Used, Standing Balance supported;walker, front-wheeled  -CB     Balance Interventions sitting;standing;sit to  stand;supported;dynamic;static;minimal challenge  -CB           User Key  (r) = Recorded By, (t) = Taken By, (c) = Cosigned By    Initials Name Provider Type    Shreya Ames, PT Physical Therapist               Goals/Plan    No documentation.                Clinical Impression     Row Name 09/10/22 0946          Pain    Additional Documentation Pain Scale: FACES Pre/Post-Treatment (Group)  -CB     Row Name 09/10/22 0946          Pain Scale: FACES Pre/Post-Treatment    Pain: FACES Scale, Pretreatment 2-->hurts little bit  -CB     Pain Location - Side/Orientation Right  -CB     Pain Location incisional  -CB     Pain Location - knee  -CB     Row Name 09/10/22 0946          Plan of Care Review    Plan of Care Reviewed With patient  -CB     Progress improving  -CB     Outcome Evaluation Patient is POD1 R TKA. She increased ambulation distance to 175ft using rwx requiring S and was Akshat for STS to rwx. Cues to maintain rwx in front of her during STS instead of pushing to the side. She demonstrated understanding of TKA protocol and education provided. No further acute PT needs. Home with family and HHPT.  -CB     Row Name 09/10/22 0946          Positioning and Restraints    Pre-Treatment Position bathroom  -CB     Post Treatment Position chair  -CB     In Chair notified nsg;reclined;encouraged to call for assist;call light within reach;with family/caregiver;legs elevated  -CB           User Key  (r) = Recorded By, (t) = Taken By, (c) = Cosigned By    Initials Name Provider Type    Shreya Ames, PT Physical Therapist               Outcome Measures     Row Name 09/10/22 0948          How much help from another person do you currently need...    Turning from your back to your side while in flat bed without using bedrails? 4  -CB     Moving from lying on back to sitting on the side of a flat bed without bedrails? 4  -CB     Moving to and from a bed to a chair (including a wheelchair)? 4  -CB     Standing up from a  chair using your arms (e.g., wheelchair, bedside chair)? 4  -CB     Climbing 3-5 steps with a railing? 3  -CB     To walk in hospital room? 3  -CB     AM-PAC 6 Clicks Score (PT) 22  -CB     Highest level of mobility 7 --> Walked 25 feet or more  -CB     Row Name 09/10/22 0948          Functional Assessment    Outcome Measure Options AM-PAC 6 Clicks Basic Mobility (PT)  -CB           User Key  (r) = Recorded By, (t) = Taken By, (c) = Cosigned By    Initials Name Provider Type    CB Shreya Flores, PT Physical Therapist              Physical Therapy Education                 Title: PT OT SLP Therapies (Done)     Topic: Physical Therapy (Done)     Point: Mobility training (Done)     Learning Progress Summary           Patient Acceptance, E,TB,D, VU,DU by  at 9/10/2022 0948    Acceptance, E,TB, VU,DU by  at 9/9/2022 1602   Family Acceptance, E,TB, VU,DU by  at 9/9/2022 1602                   Point: Home exercise program (Done)     Learning Progress Summary           Patient Acceptance, E,TB,D, VU,DU by  at 9/10/2022 0948    Acceptance, E,TB, VU,DU by  at 9/9/2022 1602   Family Acceptance, E,TB, VU,DU by  at 9/9/2022 1602                   Point: Body mechanics (Done)     Learning Progress Summary           Patient Acceptance, E,TB, VU,DU by  at 9/9/2022 1602   Family Acceptance, E,TB, VU,DU by  at 9/9/2022 1602                   Point: Precautions (Done)     Learning Progress Summary           Patient Acceptance, E,TB, VU,DU by  at 9/9/2022 1602   Family Acceptance, E,TB, VU,DU by  at 9/9/2022 1602                               User Key     Initials Effective Dates Name Provider Type Discipline    CB 10/22/21 -  Shreya Flores, PT Physical Therapist PT    CS 07/25/22 -  Sushma Givens, CHANTAL Physical Therapist PT              PT Recommendation and Plan     Plan of Care Reviewed With: patient  Progress: improving  Outcome Evaluation: Patient is POD1 R TKA. She increased ambulation distance to 175ft  using rwx requiring S and was Akshat for STS to rwx. Cues to maintain rwx in front of her during STS instead of pushing to the side. She demonstrated understanding of TKA protocol and education provided. No further acute PT needs. Home with family and HHPT.     Time Calculation:    PT Charges     Row Name 09/10/22 0948             Time Calculation    Start Time 0920  -CB      Stop Time 0934  -CB      Time Calculation (min) 14 min  -CB      PT Received On 09/10/22  -CB              Time Calculation- PT    Total Timed Code Minutes- PT 14 minute(s)  -CB              Timed Charges    95509 - PT Therapeutic Exercise Minutes 8  -CB      68732 - PT Therapeutic Activity Minutes 6  -CB              Total Minutes    Timed Charges Total Minutes 14  -CB       Total Minutes 14  -CB            User Key  (r) = Recorded By, (t) = Taken By, (c) = Cosigned By    Initials Name Provider Type    Shreya Ames, PT Physical Therapist              Therapy Charges for Today     Code Description Service Date Service Provider Modifiers Qty    88751412437 HC PT THER PROC EA 15 MIN 9/10/2022 Shreya Flores, CHANTAL GP 1          PT G-Codes  Outcome Measure Options: AM-PAC 6 Clicks Basic Mobility (PT)  AM-PAC 6 Clicks Score (PT): 22    PT Discharge Summary  Anticipated Discharge Disposition (PT): home with assist, home with home health    Shreya Flores PT  9/10/2022

## 2022-09-10 NOTE — PLAN OF CARE
Goal Outcome Evaluation:  Plan of Care Reviewed With: patient        Progress: improving  Outcome Evaluation: Patient is POD1 R TKA. She increased ambulation distance to 175ft using rwx requiring S and was Akshat for STS to rwx. Cues to maintain rwx in front of her during STS instead of pushing to the side. She demonstrated understanding of TKA protocol and education provided. No further acute PT needs. Home with family and HHPT.

## 2022-09-10 NOTE — NURSING NOTE
1030: Xarelto no longer covered by pt's insurance. spoke to Kerry MORALES. Ok to send pt home and pt needs to take Asprin 325 mg 1 PO BID x 4 weeks.

## 2022-09-10 NOTE — PLAN OF CARE
"Goal Outcome Evaluation:              Outcome Evaluation: Pt is POD 1 RTK , pt ambulated in room with use of walker/gait belt, voiding function intact, pain is 2-3 andeclines PO pain medication this shift. Ice in use, SELENA dresssing in place, green light flashing ok\" IVF given without any adverse reactions.  reaims at [facility]. D/C possibly today. WCTM.  "

## 2022-09-11 NOTE — CASE MANAGEMENT/SOCIAL WORK
Continued Stay Note  [facility]     Patient Name: Violetta Starr  MRN: [id number]  Today's Date: 9/11/2022    Admit Date: 9/9/2022     Discharge Plan     Row Name 09/11/22 1518       Plan    Final Discharge Disposition Code 06 - home with home health care    Final Note Home to be followed by [facility]               Discharge Codes    No documentation.               Expected Discharge Date and Time     Expected Discharge Date Expected Discharge Time    Sep 10, 2022             Candelaria Huerta RN

## 2022-09-12 ENCOUNTER — TRANSITIONAL CARE MANAGEMENT TELEPHONE ENCOUNTER (OUTPATIENT)
Dept: CALL CENTER | Facility: HOSPITAL | Age: 82
End: 2022-09-12

## 2022-09-12 NOTE — OUTREACH NOTE
Call Center TCM Note    Flowsheet Row Responses   [facility] patient discharged from? [place]   Does the patient have one of the following disease processes/diagnoses(primary or secondary)? Total Joint Replacement   Joint surgery performed? Knee   TCM attempt successful? Yes   Call start time 0935   Call end time 0950   Has the patient been back in either the hospital or Emergency Department since discharge? No   Discharge diagnosis Right total knee arthroplasty with robotics   Does the patient have all medications related to this admission filled (includes all antibiotics, pain medications, etc.) Yes   Is the patient taking all medications as directed (includes completed medication regime)? Yes   Is the patient able to teach back alternate methods of pain control? Ice, Knee-elevation/no pillow under knee, Reposition, Correct alignment, Short, frequent activity   What is the Home health agency?  [facility]    Has home health visited the patient within 72 hours of discharge? No   Home health interventions Called Home Health agency   Home health comments CALL TO [facility] TO INQUIRE ABOUT SERVICES. PATIENT IS SCHEDULED TO BE SEEN TOMORROW AND THEY WILL CALL HER TODAY TO SCHEDULE A TIME. CALL BACK TO PATIENT TO RELAY SAME INFORMATION.    Psychosocial issues? No   Has the patient began therapy sessions (either in the home or as an out patient)? No   Does the patient have a wound vac in place? No   Has the patient fallen since discharge? No   Did the patient receive a copy of their discharge instructions? Yes   Nursing interventions Reviewed instructions with patient   What is the patient's perception of their functional status since discharge? Improving   Is the patient able to teach back signs and symptoms of infection? Temp >100.4 for 24h or longer, Incisional drainage, Blisters around incision, Increased swelling or redness around incision (not associated with surgical edema), Severe discomfort or pain, Changes in  mobility, Shortness of breath or chest pain   Is the patient able to teach back how to prevent infection? Check incision daily, Wash hands before and after touching incision, Keep incision covered if drainage, No tub baths, hot tub or swimming   Is the patient able to teach back signs and symptoms of DVT? Redness in calf, Swelling in calf, Severe pain in calf, Area hot to touch, Shortness of breath or chest pain   Is the patient able to teach back home safety measures? Ability to shower, Accessibility to necessary areas in home, Modifications to reach items   If the patient is a current smoker, are they able to teach back resources for cessation? Not a smoker   Is the patient/caregiver able to teach back the hierarchy of who to call/visit for symptoms/problems? PCP, Specialist, Home health nurse, Urgent Care, ED, 911 Yes   TCM call completed? Yes          Lizzie Francisco LPN    9/12/2022, 09:53 EDT

## 2022-09-12 NOTE — DISCHARGE SUMMARY
Orthopedic Discharge Summary      Patient: Violetta Starr      YOB: 1940    Medical Record Number: [id number]    Attending Physician: Charan Payne MD orthopedic surgery  Consulting Physician(s): Hospitalist and internal medicine service.  Date of Admission: 9/9/2022  6:45 AM  Date of Discharge: 9/10/2022  Patient underwent right total knee arthroplasty and did very well post operatively.  She is on Eliquis for DVT prophylaxis.  She has tolerated physical therapy well.  She is neurovascularly intact.  She is pleased with her postoperative outcome.    Patient Active Problem List   Diagnosis   • Bilateral chronic knee pain   • Primary osteoarthritis of right knee   • Primary osteoarthritis of left knee   • History of malignant neoplasm of endometrium   • Essential hypertension   • Endometrial cancer (HCC)   • Anxiety   • S/P TKR (total knee replacement), left   • GERD (gastroesophageal reflux disease)   • Soft tissue swelling   • Hip pain   • Right-sided low back pain with bilateral sciatica     [place] TOTAL KNEE ARTHROPLASTY [31907] (Right total knee arthroplasty with robotics)       Allergies   Allergen Reactions   • Amlodipine-Olmesartan Itching   • Benazepril Rash       Current Medications:     Discharge Medications      New Medications      Instructions Start Date   ondansetron 4 MG tablet  Commonly known as: ZOFRAN   4 mg, Oral, Every 6 Hours PRN      oxyCODONE-acetaminophen 5-325 MG per tablet  Commonly known as: PERCOCET   Take 1 tablet by mouth Every 4 (Four) to 6 (Six) Hours As Needed for Pain.      polyethylene glycol 17 GM/SCOOP powder  Commonly known as: MIRALAX   Mix one capful (17gm) in liquid and take by mouth Daily for 10 days.      rivaroxaban 10 MG tablet  Commonly known as: XARELTO   Take 1 tablet by mouth Daily.      Senexon-S 8.6-50 MG per tablet  Generic drug: sennosides-docusate   1 tablet, Oral, 2 Times Daily         Changes to Medications      Instructions Start Date   hydrALAZINE  10 MG tablet  Commonly known as: APRESOLINE  What changed:   · when to take this  · reasons to take this  · additional instructions   Take 1 tablet by mouth twice a day as needed for systolic blood pressure greater than 160.      omeprazole 40 MG capsule  Commonly known as: priLOSEC  What changed: when to take this   40 mg, Oral, Daily         Continue These Medications      Instructions Start Date   acetaminophen 500 MG tablet  Commonly known as: TYLENOL   1,000 mg, Oral, Every 6 Hours PRN      amLODIPine 10 MG tablet  Commonly known as: NORVASC   10 mg, Oral, Nightly      atenolol 50 MG tablet  Commonly known as: TENORMIN   50 mg, Oral, Nightly      bismuth subsalicylate 262 MG/15ML suspension  Commonly known as: PEPTO BISMOL   15 mL, Oral, Every 6 Hours PRN      cetirizine 10 MG tablet  Commonly known as: zyrTEC   10 mg, Oral, As Needed      LORazepam 0.5 MG tablet  Commonly known as: ATIVAN   0.5 mg, Oral, 2 Times Daily PRN      LOSARTAN POTASSIUM PO   100 mg, Oral, Nightly         Stop These Medications    NAPROXEN DR PO                 Past Medical History:   Diagnosis Date   • Anxiety    • DDD (degenerative disc disease), lumbar    • Endometrial cancer (HCC)     Status post surgery and radiation   • Essential hypertension 05/26/2016    MANAGED BY PCP   • GERD (gastroesophageal reflux disease)    • Insomnia    • Lab test positive for detection of COVID-19 virus 07/2022   • Right knee pain     WEAKNESS AND LIMITED MOBILITY   • Skin abnormalities     AREA OF SKIN REMOVED ON FOREHEAD ABOUT 3 WEEKS AGO-BAND AID OVER TO PROTECT SCAB   • Spinal headache    • Urinary incontinence     WEARS PAD        Past Surgical History:   Procedure Laterality Date   • BLADDER REPAIR     • CATARACT EXTRACTION Bilateral    • HYSTERECTOMY Bilateral     OOPHORECTOMY   • LUMBAR LAMINECTOMY Right 05/20/2022    Procedure: MINIMALLY INVASIVE LUMBAR LAMINECTOMY, right approach, lumbar 4-lumbar 5;  Surgeon: Carmine Mchugh MD;  Location:   [facility];  Service: Neurosurgery;  Laterality: Right;   • REPLACEMENT TOTAL KNEE Left    • TOTAL KNEE ARTHROPLASTY Right 9/9/2022    Procedure: Right total knee arthroplasty with robotics;  Surgeon: Charan Payne MD;  Location: [facility] [place];  Service: Robotics - Ortho;  Laterality: Right;        Social History     Occupational History   • Occupation: Retired   Tobacco Use   • Smoking status: Never Smoker   • Smokeless tobacco: Never Used   Vaping Use   • Vaping Use: Never used   Substance and Sexual Activity   • Alcohol use: Never   • Drug use: Never   • Sexual activity: Defer      Social History     Social History Narrative   • Not on file        Family History   Problem Relation Age of Onset   • Breast cancer Mother    • Stroke Father    • Malig Hyperthermia Neg Hx              Hospital Course:  82 y.o. female admitted to [facility] to services of No att. providers found with Primary osteoarthritis of right knee [M17.11] on 9/9/2022 and underwent [place] TOTAL KNEE ARTHROPLASTY [89520] (Right total knee arthroplasty with robotics) Per No att. providers found. Antibiotic and VTE prophylaxis were per SCIP protocols. Post-operatively the patient transferred to the post-operative floor where the patient underwent mobilization therapy that included active as well as passive ROM exercises. Opioids were titrated to achieve appropriate pain management to allow for participation in mobilization exercises. Vital signs are now stable. The incision is intact without signs or symptoms of infection. Operative extremity neurovascular status remains intact.   Appropriate education re: incision care, activity levels, medications, and follow up visits was completed and all questions were answered. The patient is now deemed stable for discharge from hospital.      DIAGNOSTIC TESTS:   Admission on 09/09/2022, Discharged on 09/10/2022   Component Date Value Ref Range Status   • Glucose 09/10/2022 141 (A) 65 - 99 mg/dL Final   •  BUN 09/10/2022 17  8 - 23 mg/dL Final   • Creatinine 09/10/2022 1.04 (A) 0.57 - 1.00 mg/dL Final   • Sodium 09/10/2022 138  136 - 145 mmol/L Final   • Potassium 09/10/2022 4.8  3.5 - 5.2 mmol/L Final   • Chloride 09/10/2022 102  98 - 107 mmol/L Final   • CO2 09/10/2022 24.0  22.0 - 29.0 mmol/L Final   • Calcium 09/10/2022 9.2  8.6 - 10.5 mg/dL Final   • BUN/Creatinine Ratio 09/10/2022 16.3  7.0 - 25.0 Final   • Anion Gap 09/10/2022 12.0  5.0 - 15.0 mmol/L Final   • eGFR 09/10/2022 53.1 (A) >60.0 mL/min/1.73 Final    National Kidney Foundation and American Society of Nephrology (ASN) Task Force recommended calculation based on the Chronic Kidney Disease Epidemiology Collaboration (CKD-EPI) equation refit without adjustment for race.   • Hemoglobin 09/10/2022 9.6 (A) 12.0 - 15.9 g/dL Final   • Hematocrit 09/10/2022 28.5 (A) 34.0 - 46.6 % Final     No results found.    Discharge and Follow up Instructions:   Discharge Instructions:       1. Patient is to continue with physical therapy exercises BID and continue working with        the physical therapist as ordered.  2.  Continue to follow precautions as instructed.   3.  Patient may weight bear as tolerated.   4.  Continue TEO hose daily and ice regularly. Patient instructed on frequent calf                  pumping exercises.  May remove compression stockings at night.  5.  Patient also instructed on incentive spirometer during hospitalization and                          encouraged to continue to use at home regularly.   6.  Patient is instructed to continue DVT prophylaxis.  7.  The dressing should be left in place. If waterproof dressing is intact the patient may         shower immediately following discharge. If the dressing becomes disloged or                   saturated it should be changed and patient must wait until POD #5 to shower. If               dressing is changed, apply dry sterile dressing after showering.  8.  Follow up appointment in 2 weeks -  patient to call the office at [phone] to schedule.       Date: 9/12/2022    Charan Payne MD      Time: Discharge 20 min     DICTATED UTILIZING DRAGON DICTATION

## 2022-09-20 DIAGNOSIS — Z96.651 S/P TKR (TOTAL KNEE REPLACEMENT), RIGHT: Primary | ICD-10-CM

## 2022-09-22 ENCOUNTER — HOSPITAL ENCOUNTER (OUTPATIENT)
Dept: GENERAL RADIOLOGY | Facility: HOSPITAL | Age: 82
Discharge: HOME | End: 2022-09-22
Admitting: ORTHOPAEDIC SURGERY

## 2022-09-22 ENCOUNTER — OFFICE VISIT (OUTPATIENT)
Dept: ORTHOPEDIC SURGERY | Facility: CLINIC | Age: 82
End: 2022-09-22

## 2022-09-22 VITALS — TEMPERATURE: 98.1 F | BODY MASS INDEX: 29.88 KG/M2 | HEIGHT: 64 IN | WEIGHT: 175 LBS

## 2022-09-22 DIAGNOSIS — Z96.652 S/P TKR (TOTAL KNEE REPLACEMENT), LEFT: ICD-10-CM

## 2022-09-22 DIAGNOSIS — Z96.652 S/P TKR (TOTAL KNEE REPLACEMENT), LEFT: Primary | ICD-10-CM

## 2022-09-22 DIAGNOSIS — Z96.651 S/P TKR (TOTAL KNEE REPLACEMENT), RIGHT: ICD-10-CM

## 2022-09-22 PROCEDURE — 73560 X-RAY EXAM OF KNEE 1 OR 2: CPT

## 2022-09-22 PROCEDURE — 99024 POSTOP FOLLOW-UP VISIT: CPT | Performed by: ORTHOPAEDIC SURGERY

## 2022-09-22 RX ORDER — OXYCODONE HYDROCHLORIDE AND ACETAMINOPHEN 5; 325 MG/1; MG/1
1 TABLET ORAL EVERY 6 HOURS PRN
Qty: 40 TABLET | Refills: 0 | Status: SHIPPED | OUTPATIENT
Start: 2022-09-22 | End: 2023-02-17

## 2022-09-22 NOTE — PROGRESS NOTES
POST OP TOTAL GLOBAL      NAME: Violetta Starr           : 1940    MRN: [id number]    Chief Complaint   Patient presents with   • Right Knee - Follow-up       Date of Surgery: 2022  ?  HPI:   Patient returns today for 13 day follow up of right total knee arthroplasty Incision(s) healing nicely/as expected with no signs of infection. Patient reports doing well with no unusual complaints. No fevers, rigors or chills. Appears to be progressing appropriately. Patient is on appropriate anticoagulation.       Ortho Exam:   Right knee.The patient is status post total knee arthroplasty postoperative 13 day(s). Incision is clean. Calf is soft and nontender. Homans sign is negative. There is no clicking, popping or catching. Anterior and posterior drawer signs are negative.  There is no instability of the components. Appropriate amounts of swelling and bruising are noted. Dorsalis pedis and posterior tibial artery pulses are palpable. Common peroneal nerve function is well preserved. Range of motion is from 0-85 degrees of flexion. Gait is cautious but otherwise fairly normal. There is no evidence of a deep seated joint infection.      Diagnostic Studies:  xrays obtained today    right Knee X-Ray  Indication: Evaluation of implant position after total knee arthroplasty.  AP, Lateral views  Findings: Anatomically placed implants with a good cement mantle without any subsidence of the implants.  No periprosthetic fractures are noted.  no bony lesion  Soft tissues within normal limits  within normal limits joint spaces  Hardware appropriately positioned yes      yes prior studies available for comparison.      X-RAY was ordered and reviewed by Charan Payne MD    Assessment:  Diagnoses and all orders for this visit:    1. S/P TKR (total knee replacement), left (Primary)            Plan   · Incision care  · To use ACE wrap/TEO stocking  · Continue ice to joint   · Stretching and strengthening  exercises  · Aggressive ROM  · Falls precautions  · You may now resume any prescription medications you were taking prior to surgery  · Continue with lifelong antibiotic prophylaxis with dental procedures following total joint replacement.  · Follow up in 6 week(s)    Date of encounter: 09/22/2022   Charan Payne MD

## 2022-10-02 DIAGNOSIS — F41.9 ANXIETY: ICD-10-CM

## 2022-10-03 RX ORDER — LORAZEPAM 0.5 MG/1
0.5 TABLET ORAL 2 TIMES DAILY PRN
Qty: 30 TABLET | Refills: 0 | Status: SHIPPED | OUTPATIENT
Start: 2022-10-03 | End: 2023-01-24

## 2022-11-07 ENCOUNTER — OFFICE VISIT (OUTPATIENT)
Dept: ORTHOPEDIC SURGERY | Facility: CLINIC | Age: 82
End: 2022-11-07

## 2022-11-07 VITALS — WEIGHT: 170.4 LBS | TEMPERATURE: 98.1 F | HEIGHT: 65 IN | BODY MASS INDEX: 28.39 KG/M2

## 2022-11-07 DIAGNOSIS — Z96.651 STATUS POST TOTAL KNEE REPLACEMENT, RIGHT: Primary | ICD-10-CM

## 2022-11-07 PROCEDURE — 99024 POSTOP FOLLOW-UP VISIT: CPT | Performed by: ORTHOPAEDIC SURGERY

## 2022-11-07 NOTE — PROGRESS NOTES
POST OP TOTAL GLOBAL      NAME: Violetta tSarr           : 1940    MRN: [id number]    Chief Complaint   Patient presents with   • Right Knee - Follow-up       Date of Surgery: 2022  ?  HPI:   Patient returns today for 8 week follow up of right total knee arthroplasty Incision(s) healed nicely with no signs of infection. Patient reports doing well with no unusual complaints. No fevers, rigors or chills. Appears to be progressing appropriately. Patient is on appropriate anticoagulation.       Ortho Exam:   Right knee.The patient is status post total knee arthroplasty postoperative 8 week(s). Incision is clean. Calf is soft and nontender. Homans sign is negative. There is no clicking, popping or catching. Anterior and posterior drawer signs are negative.  There is no instability of the components. Appropriate amounts of swelling and bruising are noted. Dorsalis pedis and posterior tibial artery pulses are palpable. Common peroneal nerve function is well preserved. Range of motion is from 0-120 degrees of flexion. Gait is cautious but otherwise fairly normal. There is no evidence of a deep seated joint infection.      Diagnostic Studies:  xrays to be obtained at next visit        Assessment:  Diagnoses and all orders for this visit:    1. Status post total knee replacement, right (Primary)            Plan   · Incision care  · To use ACE wrap/TEO stocking  · Continue ice to joint   · Stretching and strengthening exercises  · Aggressive ROM  · Falls precautions  · You may now resume any prescription medications you were taking prior to surgery  · Continue with lifelong antibiotic prophylaxis with dental procedures following total joint replacement.  · Follow up in 1 year(s) WITH X-RAYS    Date of encounter: 2022   Charan Payne MD

## 2022-11-20 PROBLEM — Z96.651 STATUS POST TOTAL KNEE REPLACEMENT, RIGHT: Status: ACTIVE | Noted: 2022-11-20

## 2023-01-24 DIAGNOSIS — F41.9 ANXIETY: ICD-10-CM

## 2023-01-24 RX ORDER — LORAZEPAM 0.5 MG/1
0.5 TABLET ORAL 2 TIMES DAILY PRN
Qty: 10 TABLET | Refills: 0 | Status: SHIPPED | OUTPATIENT
Start: 2023-01-24 | End: 2023-02-17 | Stop reason: SDUPTHER

## 2023-01-24 NOTE — TELEPHONE ENCOUNTER
I have reviewed Christiano and sent a limited quantity of this.  Because it is a controlled substance, I do not feel I am able to send a full prescription for it.  If she will need a further refill prior to her appointment in June, she will need to be seen.  Thanks.

## 2023-01-30 ENCOUNTER — EXTERNAL PBMM DATA (OUTPATIENT)
Dept: PHARMACY | Facility: OTHER | Age: 83
End: 2023-01-30
Payer: MEDICARE

## 2023-02-17 ENCOUNTER — OFFICE VISIT (OUTPATIENT)
Dept: FAMILY MEDICINE CLINIC | Age: 83
End: 2023-02-17
Payer: MEDICARE

## 2023-02-17 VITALS
HEIGHT: 65 IN | HEART RATE: 68 BPM | BODY MASS INDEX: 28.92 KG/M2 | OXYGEN SATURATION: 97 % | SYSTOLIC BLOOD PRESSURE: 114 MMHG | TEMPERATURE: 97.9 F | DIASTOLIC BLOOD PRESSURE: 74 MMHG | WEIGHT: 173.6 LBS

## 2023-02-17 DIAGNOSIS — Z23 ENCOUNTER FOR IMMUNIZATION: ICD-10-CM

## 2023-02-17 DIAGNOSIS — K21.9 GASTROESOPHAGEAL REFLUX DISEASE WITHOUT ESOPHAGITIS: ICD-10-CM

## 2023-02-17 DIAGNOSIS — D64.9 ANEMIA, UNSPECIFIED TYPE: ICD-10-CM

## 2023-02-17 DIAGNOSIS — F41.9 ANXIETY: ICD-10-CM

## 2023-02-17 DIAGNOSIS — I10 ESSENTIAL HYPERTENSION: Primary | ICD-10-CM

## 2023-02-17 LAB
EXPIRATION DATE: ABNORMAL
HGB BLDA-MCNC: 11 G/DL (ref 12–17)
Lab: ABNORMAL

## 2023-02-17 PROCEDURE — 99214 OFFICE O/P EST MOD 30 MIN: CPT | Performed by: FAMILY MEDICINE

## 2023-02-17 PROCEDURE — 85018 HEMOGLOBIN: CPT | Performed by: FAMILY MEDICINE

## 2023-02-17 PROCEDURE — 0124A COVID-19 (PFIZER) BIVALENT BOOSTER 12+YRS: CPT | Performed by: FAMILY MEDICINE

## 2023-02-17 PROCEDURE — 91312 COVID-19 (PFIZER) BIVALENT BOOSTER 12+YRS: CPT | Performed by: FAMILY MEDICINE

## 2023-02-17 RX ORDER — AMLODIPINE BESYLATE 10 MG/1
10 TABLET ORAL NIGHTLY
Qty: 90 TABLET | Refills: 3 | Status: SHIPPED | OUTPATIENT
Start: 2023-02-17

## 2023-02-17 RX ORDER — LORAZEPAM 0.5 MG/1
0.5 TABLET ORAL 2 TIMES DAILY PRN
Qty: 30 TABLET | Refills: 1 | Status: SHIPPED | OUTPATIENT
Start: 2023-02-17

## 2023-02-17 RX ORDER — ATENOLOL 50 MG/1
50 TABLET ORAL NIGHTLY
Qty: 90 TABLET | Refills: 3 | Status: SHIPPED | OUTPATIENT
Start: 2023-02-17

## 2023-02-17 RX ORDER — LOSARTAN POTASSIUM 100 MG/1
100 TABLET ORAL NIGHTLY
Qty: 90 TABLET | Refills: 3 | Status: SHIPPED | OUTPATIENT
Start: 2023-02-17

## 2023-02-17 NOTE — PROGRESS NOTES
Violetta Starr presents to [facility].    Chief Complaint:  Anxiety, blood pressure, GERD    Subjective       History of Present Illness:  Violetta is being seen today in follow-up.  She has an intermittent issue with anxiety for which she takes lorazepam very sparingly.  She is not aware of obvious side effects related to this.  She denies abuse, diversion, or doctor shopping.  Christiano is reviewed.     Violetta also has hypertension for which she remains on multiple medications as noted below.  She takes her blood pressure medications at night and states that her blood pressure has been better controlled.  She denies any significant side effects from the medications.  She has a bottle of hydralazine at home for as needed use if her blood pressure kind of shoots up.  She has not taken it for over a year though.    She also has chronic acid reflux for which she takes omeprazole.  She actually does not take it on a daily basis.  Her reflux does fairly well.    Review of Systems:  Review of Systems   Constitutional: Negative for chills and fever.   Respiratory: Negative for cough and shortness of breath.    Cardiovascular: Negative for chest pain and palpitations.   Gastrointestinal: Negative for abdominal pain, nausea and vomiting.      Objective   Medical History:  Past Medical History:   • Anxiety   • DDD (degenerative disc disease), lumbar   • Endometrial cancer (HCC)    Status post surgery and radiation   • Essential hypertension    MANAGED BY PCP   • GERD (gastroesophageal reflux disease)   • Insomnia   • Lab test positive for detection of COVID-19 virus   • Right knee pain    WEAKNESS AND LIMITED MOBILITY   • Skin abnormalities    AREA OF SKIN REMOVED ON FOREHEAD ABOUT 3 WEEKS AGO-BAND AID OVER TO PROTECT SCAB   • Spinal headache   • Urinary incontinence    WEARS PAD     Past Surgical History:   • BLADDER REPAIR   • CATARACT EXTRACTION   • HYSTERECTOMY    OOPHORECTOMY   • LUMBAR  LAMINECTOMY    Procedure: MINIMALLY INVASIVE LUMBAR LAMINECTOMY, right approach, lumbar 4-lumbar 5;  Surgeon: Carmine Mchugh MD;  Location: [facility] MA[facility] OR;  Service: Neurosurgery;  Laterality: Right;   • REPLACEMENT TOTAL KNEE   • TOTAL KNEE ARTHROPLASTY    Procedure: Right total knee arthroplasty with robotics;  Surgeon: Charan Payne MD;  Location:  [facility];  Service: Robotics - Ortho;  Laterality: Right;      Family History   Problem Relation Age of Onset   • Breast cancer Mother    • Stroke Father    • Malig Hyperthermia Neg Hx      Social History     Tobacco Use   • Smoking status: Never   • Smokeless tobacco: Never   Substance Use Topics   • Alcohol use: Never       Health Maintenance Due   Topic Date Due   • TDAP/TD VACCINES (1 - Tdap) Never done   • ZOSTER VACCINE (1 of 2) Never done   • COVID-19 Vaccine (5 - Booster for Pfizer series) 09/21/2022        Immunization History   Administered Date(s) Administered   • COVID-19 (PFIZER) PURPLE CAP 03/08/2021, 03/29/2021, 10/17/2021   • Covid-19 (Pfizer) Gray Cap 07/27/2022   • Fluzone High Dose =>65 Years ([employer] ONLY) 01/07/2020, 11/11/2020   • Fluzone High-Dose 65+yrs 11/11/2020, 10/17/2021   • Influenza, Unspecified 01/01/2018   • Pneumococcal Conjugate 13-Valent (PCV13) 03/13/2015   • Pneumococcal Polysaccharide (PPSV23) 02/11/2014       Allergies   Allergen Reactions   • Amlodipine-Olmesartan Itching   • Benazepril Rash        Medications:  Current Outpatient Medications on File Prior to Visit   Medication Sig   • acetaminophen (TYLENOL) 500 MG tablet Take 1,000 mg by mouth Every 6 (Six) Hours As Needed for Mild Pain .   • bismuth subsalicylate (PEPTO BISMOL) 262 MG/15ML suspension Take 15 mL by mouth Every 6 (Six) Hours As Needed for Indigestion.   • cetirizine (zyrTEC) 10 MG tablet Take 10 mg by mouth As Needed for Allergies.   • hydrALAZINE (APRESOLINE) 10 MG tablet Take 1 tablet by mouth twice a day as needed for systolic blood pressure greater  "than 160. (Patient taking differently: As Needed. Take 1 tablet by mouth twice a day as needed for systolic blood pressure greater than 160.  HAS NOT TAKEN FOR OVER A YEAR)   • omeprazole (priLOSEC) 40 MG capsule Take 1 capsule by mouth Daily. (Patient taking differently: Take 40 mg by mouth Every Night.)   • ondansetron (ZOFRAN) 4 MG tablet Take 1 tablet by mouth Every 6 (Six) Hours As Needed for Nausea or Vomiting.   • [DISCONTINUED] amLODIPine (NORVASC) 10 MG tablet Take 1 tablet by mouth Every Night.   • [DISCONTINUED] atenolol (TENORMIN) 50 MG tablet Take 1 tablet by mouth Every Night.   • [DISCONTINUED] LORazepam (ATIVAN) 0.5 MG tablet Take 1 tablet by mouth 2 (Two) Times a Day As Needed for Anxiety.   • [DISCONTINUED] LOSARTAN POTASSIUM PO Take 100 mg by mouth Every Night.   • [DISCONTINUED] oxyCODONE-acetaminophen (PERCOCET) 5-325 MG per tablet Take 1 tablet by mouth Every 6 (Six) Hours As Needed for Moderate Pain.   • [DISCONTINUED] rivaroxaban (XARELTO) 10 MG tablet Take 1 tablet by mouth Daily.   • [DISCONTINUED] sennosides-docusate (PERICOLACE) 8.6-50 MG per tablet Take 1 tablet by mouth 2 (Two) Times a Day.     Current Facility-Administered Medications on File Prior to Visit   Medication   • Chlorhexidine Gluconate Cloth 2 % pads       Vital Signs:   /74 (BP Location: Right arm, Patient Position: Sitting, Cuff Size: Large Adult)   Pulse 68   Temp 97.9 °F (36.6 °C) (Oral)   Ht 165.1 cm (65\")   Wt 78.7 kg (173 lb 9.6 oz)   SpO2 97%   BMI 28.89 kg/m²       Physical Exam:  Physical Exam  Vitals reviewed.   Constitutional:       General: She is not in acute distress.     Appearance: She is not ill-appearing.   Eyes:      Pupils: Pupils are equal, round, and reactive to light.   Neck:      Comments: No thyromegaly  Cardiovascular:      Rate and Rhythm: Normal rate and regular rhythm.   Pulmonary:      Effort: Pulmonary effort is normal.      Breath sounds: Normal breath sounds.   Abdominal:      " General: There is no distension.      Palpations: Abdomen is soft.      Tenderness: There is no abdominal tenderness.   Musculoskeletal:      Cervical back: Neck supple.   Lymphadenopathy:      Cervical: No cervical adenopathy.   Skin:     Findings: No lesion or rash.   Neurological:      Mental Status: She is alert.         Result Review      The following data was reviewed by Antoni Gonzalez MD on 02/17/2023.  Lab Results   Component Value Date    WBC 4.68 09/07/2022    HGB 9.6 (L) 09/10/2022    HCT 28.5 (L) 09/10/2022    MCV 94.2 09/07/2022     09/07/2022     Lab Results   Component Value Date    GLUCOSE 141 (H) 09/10/2022    BUN 17 09/10/2022    CREATININE 1.04 (H) 09/10/2022     09/10/2022    K 4.8 09/10/2022     09/10/2022    CO2 24.0 09/10/2022    CALCIUM 9.2 09/10/2022    PROTEINTOT 7.7 06/16/2022    ALBUMIN 4.50 06/16/2022    ALT 14 06/16/2022    AST 20 06/16/2022    ALKPHOS 48 06/16/2022    BILITOT 0.4 06/16/2022    EGFR 53.1 (L) 09/10/2022    GLOB 3.2 06/16/2022    AGRATIO 1.4 06/16/2022    BCR 16.3 09/10/2022    ANIONGAP 12.0 09/10/2022      No results found for: CHOL, CHLPL, TRIG, HDL, LDL, LDLDIRECT  Lab Results   Component Value Date    TSH 1.880 06/16/2022     Lab Results   Component Value Date    HGBA1C 5.80 (H) 09/07/2022            Assessment and Plan:   Today, we have reviewed her care.  Her blood pressure is well controlled today.  We will refill her medications as noted below.  She does have chronic anxiety, but she only uses the lorazepam sparingly.  We have again discussed some potential risks with the medication, but we will refill it for when she needs.  Her hemoglobin level was a little bit low after surgery.  We will do a fingerstick to make sure she is not still anemic.  Finally, COVID-19 booster will be given today.  She has an appointment scheduled in [place] already, but we will delay that until later in the year.  She is doing well.       Diagnoses and all  orders for this visit:    1. Essential hypertension (Primary)  -     amLODIPine (NORVASC) 10 MG tablet; Take 1 tablet by mouth Every Night.  Dispense: 90 tablet; Refill: 3  -     atenolol (TENORMIN) 50 MG tablet; Take 1 tablet by mouth Every Night.  Dispense: 90 tablet; Refill: 3  -     losartan (COZAAR) 100 MG tablet; Take 1 tablet by mouth Every Night.  Dispense: 90 tablet; Refill: 3    2. Anxiety  -     LORazepam (ATIVAN) 0.5 MG tablet; Take 1 tablet by mouth 2 (Two) Times a Day As Needed for Anxiety.  Dispense: 30 tablet; Refill: 1    3. Gastroesophageal reflux disease without esophagitis  Comments:  As above.    4. Anemia, unspecified type  -     POCT hemoglobin    5. Encounter for immunization  -     COVID-19 Bivalent Booster (Pfizer) 12+yrs          Follow Up   Return in about 6 months (around 8/17/2023) for Recheck, Medicare Wellness.  Patient was given instructions and counseling regarding her condition or for health maintenance advice. Please see specific information pulled into the AVS if appropriate.

## 2023-02-27 ENCOUNTER — LAB (OUTPATIENT)
Dept: LAB | Facility: HOSPITAL | Age: 83
End: 2023-02-27
Payer: MEDICARE

## 2023-02-27 ENCOUNTER — EXTERNAL PBMM DATA (OUTPATIENT)
Dept: PHARMACY | Facility: OTHER | Age: 83
End: 2023-02-27
Payer: MEDICARE

## 2023-02-27 DIAGNOSIS — I10 ESSENTIAL HYPERTENSION: ICD-10-CM

## 2023-02-27 DIAGNOSIS — D64.9 ANEMIA, UNSPECIFIED TYPE: ICD-10-CM

## 2023-02-27 LAB
ALBUMIN SERPL-MCNC: 4.5 G/DL (ref 3.5–5.2)
ALBUMIN/GLOB SERPL: 1.5 G/DL
ALP SERPL-CCNC: 49 U/L (ref 39–117)
ALT SERPL W P-5'-P-CCNC: 10 U/L (ref 1–33)
ANION GAP SERPL CALCULATED.3IONS-SCNC: 10.6 MMOL/L (ref 5–15)
AST SERPL-CCNC: 20 U/L (ref 1–32)
BILIRUB SERPL-MCNC: 0.4 MG/DL (ref 0–1.2)
BUN SERPL-MCNC: 15 MG/DL (ref 8–23)
BUN/CREAT SERPL: 13.6 (ref 7–25)
CALCIUM SPEC-SCNC: 10.5 MG/DL (ref 8.6–10.5)
CHLORIDE SERPL-SCNC: 103 MMOL/L (ref 98–107)
CO2 SERPL-SCNC: 27.4 MMOL/L (ref 22–29)
CREAT SERPL-MCNC: 1.1 MG/DL (ref 0.57–1)
DEPRECATED RDW RBC AUTO: 45 FL (ref 37–54)
EGFRCR SERPLBLD CKD-EPI 2021: 49.7 ML/MIN/1.73
ERYTHROCYTE [DISTWIDTH] IN BLOOD BY AUTOMATED COUNT: 12.8 % (ref 12.3–15.4)
FERRITIN SERPL-MCNC: 191 NG/ML (ref 13–150)
FOLATE SERPL-MCNC: 6.89 NG/ML (ref 4.78–24.2)
GLOBULIN UR ELPH-MCNC: 3.1 GM/DL
GLUCOSE SERPL-MCNC: 89 MG/DL (ref 65–99)
HCT VFR BLD AUTO: 35.1 % (ref 34–46.6)
HGB BLD-MCNC: 11.6 G/DL (ref 12–15.9)
IRON 24H UR-MRATE: 82 MCG/DL (ref 37–145)
IRON SATN MFR SERPL: 21 % (ref 20–50)
MCH RBC QN AUTO: 31.1 PG (ref 26.6–33)
MCHC RBC AUTO-ENTMCNC: 33 G/DL (ref 31.5–35.7)
MCV RBC AUTO: 94.1 FL (ref 79–97)
PLATELET # BLD AUTO: 225 10*3/MM3 (ref 140–450)
PMV BLD AUTO: 10.4 FL (ref 6–12)
POTASSIUM SERPL-SCNC: 4.5 MMOL/L (ref 3.5–5.2)
PROT SERPL-MCNC: 7.6 G/DL (ref 6–8.5)
RBC # BLD AUTO: 3.73 10*6/MM3 (ref 3.77–5.28)
SODIUM SERPL-SCNC: 141 MMOL/L (ref 136–145)
TIBC SERPL-MCNC: 387 MCG/DL (ref 298–536)
TRANSFERRIN SERPL-MCNC: 260 MG/DL (ref 200–360)
VIT B12 BLD-MCNC: 426 PG/ML (ref 211–946)
WBC NRBC COR # BLD: 5.05 10*3/MM3 (ref 3.4–10.8)

## 2023-02-27 PROCEDURE — 82607 VITAMIN B-12: CPT

## 2023-02-27 PROCEDURE — 82728 ASSAY OF FERRITIN: CPT

## 2023-02-27 PROCEDURE — 80053 COMPREHEN METABOLIC PANEL: CPT

## 2023-02-27 PROCEDURE — 84466 ASSAY OF TRANSFERRIN: CPT

## 2023-02-27 PROCEDURE — 83540 ASSAY OF IRON: CPT

## 2023-02-27 PROCEDURE — 82746 ASSAY OF FOLIC ACID SERUM: CPT

## 2023-02-27 PROCEDURE — 36415 COLL VENOUS BLD VENIPUNCTURE: CPT

## 2023-02-27 PROCEDURE — 85027 COMPLETE CBC AUTOMATED: CPT

## 2023-03-13 NOTE — TELEPHONE ENCOUNTER
Let her know we can try an injection in the SI joint, it may or may not help. Let her know that a lot of times when the pain goes into the leg it could be coming from the back and we do not treat the back.   Surgery letter faxed to [facility] at [phone].

## 2023-07-31 ENCOUNTER — TRANSCRIBE ORDERS (OUTPATIENT)
Dept: ADMINISTRATIVE | Facility: HOSPITAL | Age: 83
End: 2023-07-31
Payer: MEDICARE

## 2023-07-31 DIAGNOSIS — Z12.31 SCREENING MAMMOGRAM FOR BREAST CANCER: Primary | ICD-10-CM

## 2023-09-12 ENCOUNTER — LAB (OUTPATIENT)
Dept: LAB | Facility: HOSPITAL | Age: 83
End: 2023-09-12
Payer: MEDICARE

## 2023-09-12 ENCOUNTER — OFFICE VISIT (OUTPATIENT)
Dept: FAMILY MEDICINE CLINIC | Age: 83
End: 2023-09-12
Payer: MEDICARE

## 2023-09-12 VITALS
TEMPERATURE: 98.4 F | WEIGHT: 172 LBS | BODY MASS INDEX: 28.66 KG/M2 | DIASTOLIC BLOOD PRESSURE: 55 MMHG | HEART RATE: 70 BPM | SYSTOLIC BLOOD PRESSURE: 107 MMHG | HEIGHT: 65 IN

## 2023-09-12 DIAGNOSIS — I87.2 CHRONIC VENOUS INSUFFICIENCY: ICD-10-CM

## 2023-09-12 DIAGNOSIS — N18.31 STAGE 3A CHRONIC KIDNEY DISEASE: ICD-10-CM

## 2023-09-12 DIAGNOSIS — R35.1 NOCTURIA: ICD-10-CM

## 2023-09-12 DIAGNOSIS — Z79.899 OTHER LONG TERM (CURRENT) DRUG THERAPY: ICD-10-CM

## 2023-09-12 DIAGNOSIS — Z00.00 PHYSICAL EXAM: Primary | ICD-10-CM

## 2023-09-12 DIAGNOSIS — I10 ESSENTIAL HYPERTENSION: ICD-10-CM

## 2023-09-12 DIAGNOSIS — D64.9 ANEMIA, UNSPECIFIED TYPE: ICD-10-CM

## 2023-09-12 DIAGNOSIS — F41.9 ANXIETY: ICD-10-CM

## 2023-09-12 LAB
ALBUMIN SERPL-MCNC: 4.3 G/DL (ref 3.5–5.2)
ALBUMIN/GLOB SERPL: 1.6 G/DL
ALP SERPL-CCNC: 44 U/L (ref 39–117)
ALT SERPL W P-5'-P-CCNC: 16 U/L (ref 1–33)
ANION GAP SERPL CALCULATED.3IONS-SCNC: 10.9 MMOL/L (ref 5–15)
AST SERPL-CCNC: 25 U/L (ref 1–32)
BACTERIA UR QL AUTO: ABNORMAL /HPF
BILIRUB SERPL-MCNC: 0.2 MG/DL (ref 0–1.2)
BILIRUB UR QL STRIP: ABNORMAL
BUN SERPL-MCNC: 18 MG/DL (ref 8–23)
BUN/CREAT SERPL: 18.6 (ref 7–25)
CALCIUM SPEC-SCNC: 9.6 MG/DL (ref 8.6–10.5)
CHLORIDE SERPL-SCNC: 104 MMOL/L (ref 98–107)
CLARITY UR: CLEAR
CO2 SERPL-SCNC: 24.1 MMOL/L (ref 22–29)
COLOR UR: ABNORMAL
CREAT SERPL-MCNC: 0.97 MG/DL (ref 0.57–1)
DEPRECATED RDW RBC AUTO: 43.9 FL (ref 37–54)
EGFRCR SERPLBLD CKD-EPI 2021: 57.4 ML/MIN/1.73
ERYTHROCYTE [DISTWIDTH] IN BLOOD BY AUTOMATED COUNT: 12.7 % (ref 12.3–15.4)
GLOBULIN UR ELPH-MCNC: 2.7 GM/DL
GLUCOSE SERPL-MCNC: 98 MG/DL (ref 65–99)
GLUCOSE UR STRIP-MCNC: NEGATIVE MG/DL
HCT VFR BLD AUTO: 33.6 % (ref 34–46.6)
HGB BLD-MCNC: 11.2 G/DL (ref 12–15.9)
HGB UR QL STRIP.AUTO: NEGATIVE
HYALINE CASTS UR QL AUTO: ABNORMAL /LPF
KETONES UR QL STRIP: NEGATIVE
LEUKOCYTE ESTERASE UR QL STRIP.AUTO: NEGATIVE
MCH RBC QN AUTO: 31.3 PG (ref 26.6–33)
MCHC RBC AUTO-ENTMCNC: 33.3 G/DL (ref 31.5–35.7)
MCV RBC AUTO: 93.9 FL (ref 79–97)
MUCOUS THREADS URNS QL MICRO: ABNORMAL /HPF
NITRITE UR QL STRIP: NEGATIVE
PH UR STRIP.AUTO: <=5 [PH] (ref 5–8)
PLATELET # BLD AUTO: 192 10*3/MM3 (ref 140–450)
PMV BLD AUTO: 9.6 FL (ref 6–12)
POTASSIUM SERPL-SCNC: 4.1 MMOL/L (ref 3.5–5.2)
PROT SERPL-MCNC: 7 G/DL (ref 6–8.5)
PROT UR QL STRIP: ABNORMAL
RBC # BLD AUTO: 3.58 10*6/MM3 (ref 3.77–5.28)
RBC # UR STRIP: ABNORMAL /HPF
REF LAB TEST METHOD: ABNORMAL
SODIUM SERPL-SCNC: 139 MMOL/L (ref 136–145)
SP GR UR STRIP: >=1.03 (ref 1–1.03)
SQUAMOUS #/AREA URNS HPF: ABNORMAL /HPF
TSH SERPL DL<=0.05 MIU/L-ACNC: 2.64 UIU/ML (ref 0.27–4.2)
UROBILINOGEN UR QL STRIP: ABNORMAL
WBC # UR STRIP: ABNORMAL /HPF
WBC NRBC COR # BLD: 4.83 10*3/MM3 (ref 3.4–10.8)

## 2023-09-12 PROCEDURE — 36415 COLL VENOUS BLD VENIPUNCTURE: CPT

## 2023-09-12 PROCEDURE — 1160F RVW MEDS BY RX/DR IN RCRD: CPT | Performed by: FAMILY MEDICINE

## 2023-09-12 PROCEDURE — 1170F FXNL STATUS ASSESSED: CPT | Performed by: FAMILY MEDICINE

## 2023-09-12 PROCEDURE — 1126F AMNT PAIN NOTED NONE PRSNT: CPT | Performed by: FAMILY MEDICINE

## 2023-09-12 PROCEDURE — 83540 ASSAY OF IRON: CPT | Performed by: FAMILY MEDICINE

## 2023-09-12 PROCEDURE — 84466 ASSAY OF TRANSFERRIN: CPT | Performed by: FAMILY MEDICINE

## 2023-09-12 PROCEDURE — 80053 COMPREHEN METABOLIC PANEL: CPT

## 2023-09-12 PROCEDURE — 84443 ASSAY THYROID STIM HORMONE: CPT

## 2023-09-12 PROCEDURE — 87086 URINE CULTURE/COLONY COUNT: CPT | Performed by: FAMILY MEDICINE

## 2023-09-12 PROCEDURE — 99214 OFFICE O/P EST MOD 30 MIN: CPT | Performed by: FAMILY MEDICINE

## 2023-09-12 PROCEDURE — 1159F MED LIST DOCD IN RCRD: CPT | Performed by: FAMILY MEDICINE

## 2023-09-12 PROCEDURE — G0439 PPPS, SUBSEQ VISIT: HCPCS | Performed by: FAMILY MEDICINE

## 2023-09-12 PROCEDURE — 96160 PT-FOCUSED HLTH RISK ASSMT: CPT | Performed by: FAMILY MEDICINE

## 2023-09-12 PROCEDURE — 81001 URINALYSIS AUTO W/SCOPE: CPT

## 2023-09-12 PROCEDURE — 82728 ASSAY OF FERRITIN: CPT | Performed by: FAMILY MEDICINE

## 2023-09-12 PROCEDURE — 3074F SYST BP LT 130 MM HG: CPT | Performed by: FAMILY MEDICINE

## 2023-09-12 PROCEDURE — 3078F DIAST BP <80 MM HG: CPT | Performed by: FAMILY MEDICINE

## 2023-09-12 PROCEDURE — 85027 COMPLETE CBC AUTOMATED: CPT

## 2023-09-12 RX ORDER — LOSARTAN POTASSIUM 100 MG/1
100 TABLET ORAL NIGHTLY
Qty: 90 TABLET | Refills: 3 | Status: SHIPPED | OUTPATIENT
Start: 2023-09-12

## 2023-09-12 RX ORDER — LORAZEPAM 0.5 MG/1
0.5 TABLET ORAL 2 TIMES DAILY PRN
Qty: 30 TABLET | Refills: 1 | Status: SHIPPED | OUTPATIENT
Start: 2023-09-12

## 2023-09-12 RX ORDER — ATENOLOL 50 MG/1
50 TABLET ORAL NIGHTLY
Qty: 90 TABLET | Refills: 3 | Status: SHIPPED | OUTPATIENT
Start: 2023-09-12

## 2023-09-12 RX ORDER — AMLODIPINE BESYLATE 10 MG/1
10 TABLET ORAL NIGHTLY
Qty: 90 TABLET | Refills: 3 | Status: SHIPPED | OUTPATIENT
Start: 2023-09-12

## 2023-09-12 NOTE — PROGRESS NOTES
The ABCs of the Annual Wellness Visit  Subsequent Medicare Wellness Visit    Subjective    Violetta Starr is a 83 y.o. female who presents for a Subsequent Medicare Wellness Visit.    The following portions of the patient's history were reviewed and updated as appropriate: allergies, current medications, past family history, past medical history, past social history, past surgical history, and problem list.    Compared to one year ago, the patient feels her physical health is better.    Compared to one year ago, the patient feels her mental health is the same.    Recent Hospitalizations:  She was not admitted to the hospital during the last year.     Current Medical Providers:  Patient Care Team:  Antoni Gonzalez MD as PCP - General (Family Medicine)    Outpatient Medications Prior to Visit   Medication Sig Dispense Refill    acetaminophen (TYLENOL) 500 MG tablet Take 2 tablets by mouth Every 6 (Six) Hours As Needed for Mild Pain.      cetirizine (zyrTEC) 10 MG tablet Take 1 tablet by mouth As Needed for Allergies.      hydrALAZINE (APRESOLINE) 10 MG tablet Take 1 tablet by mouth twice a day as needed for systolic blood pressure greater than 160. (Patient taking differently: As Needed. Take 1 tablet by mouth twice a day as needed for systolic blood pressure greater than 160.  HAS NOT TAKEN FOR OVER A YEAR) 90 tablet 3    omeprazole (priLOSEC) 40 MG capsule Take 1 capsule by mouth Daily. (Patient taking differently: Take 1 capsule by mouth Every Night.) 90 capsule 3    ondansetron (ZOFRAN) 4 MG tablet Take 1 tablet by mouth Every 6 (Six) Hours As Needed for Nausea or Vomiting. 10 tablet 0    amLODIPine (NORVASC) 10 MG tablet Take 1 tablet by mouth Every Night. 90 tablet 3    atenolol (TENORMIN) 50 MG tablet Take 1 tablet by mouth Every Night. 90 tablet 3    bismuth subsalicylate (PEPTO BISMOL) 262 MG/15ML suspension Take 15 mL by mouth Every 6 (Six) Hours As Needed for Indigestion.      LORazepam (ATIVAN)  "0.5 MG tablet Take 1 tablet by mouth 2 (Two) Times a Day As Needed for Anxiety. 30 tablet 1    losartan (COZAAR) 100 MG tablet Take 1 tablet by mouth Every Night. 90 tablet 3     Facility-Administered Medications Prior to Visit   Medication Dose Route Frequency Provider Last Rate Last Admin    Chlorhexidine Gluconate Cloth 2 % pads   Apply externally Take As Directed Charan Payne MD           No opioid medication identified on active medication list. I have reviewed chart for other potential  high risk medication/s and harmful drug interactions in the elderly.      Aspirin is not on active medication list.  Aspirin use is not indicated based on review of current medical condition/s. Risk of harm outweighs potential benefits.  Her , [place], would make decisions if needed.    Patient Active Problem List   Diagnosis    Bilateral chronic knee pain    Primary osteoarthritis of right knee    Primary osteoarthritis of left knee    History of malignant neoplasm of endometrium    Essential hypertension    Endometrial cancer    Anxiety    S/P TKR (total knee replacement), left    GERD (gastroesophageal reflux disease)    Soft tissue swelling    Hip pain    Right-sided low back pain with bilateral sciatica    Status post total knee replacement, right     Advance Care Planning  Advance Directive is on file.  ACP discussion was held with the patient during this visit. Patient has an advance directive in EMR which is still valid.      Objective    Vitals:    09/12/23 1347   BP: 107/55   BP Location: Right arm   Patient Position: Sitting   Pulse: 70   Temp: 98.4 °F (36.9 °C)   TempSrc: Oral   Weight: 78 kg (172 lb)   Height: 165.1 cm (65\")   PainSc: 0-No pain     Estimated body mass index is 28.62 kg/m² as calculated from the following:    Height as of this encounter: 165.1 cm (65\").    Weight as of this encounter: 78 kg (172 lb).    BMI is >= 25 and <30. (Overweight) The following options were offered after discussion;: " exercise counseling/recommendations and nutrition counseling/recommendations    Does the patient have evidence of cognitive impairment? No.        HEALTH RISK ASSESSMENT    Smoking Status:  Social History     Tobacco Use   Smoking Status Never   Smokeless Tobacco Never     Alcohol Consumption:  Social History     Substance and Sexual Activity   Alcohol Use Never     Fall Risk Screen:    HECTORADI Fall Risk Assessment was completed, and patient is at LOW risk for falls.Assessment completed on:2023    Depression Screenin/12/2023     1:48 PM   PHQ-2/PHQ-9 Depression Screening   Little Interest or Pleasure in Doing Things 0-->not at all   Feeling Down, Depressed or Hopeless 0-->not at all   PHQ-9: Brief Depression Severity Measure Score 0       Health Habits and Functional and Cognitive Screenin/12/2023     1:48 PM   Functional & Cognitive Status   Do you have difficulty preparing food and eating? No   Do you have difficulty bathing yourself, getting dressed or grooming yourself? No   Do you have difficulty using the toilet? No   Do you have difficulty moving around from place to place? No   Do you have trouble with steps or getting out of a bed or a chair? No   Current Diet Well Balanced Diet   Dental Exam Up to date   Eye Exam Not up to date   Exercise (times per week) 4 times per week   Current Exercises Include Walking   Do you need help using the phone?  No   Are you deaf or do you have serious difficulty hearing?  No   Do you need help to go to places out of walking distance? No   Do you need help shopping? No   Do you need help preparing meals?  No   Do you need help with housework?  No   Do you need help with laundry? No   Do you need help taking your medications? No   Do you need help managing money? No   Do you ever drive or ride in a car without wearing a seat belt? No   Have you felt unusual stress, anger or loneliness in the last month? No   Who do you live with? Spouse   If you need  help, do you have trouble finding someone available to you? No   Have you been bothered in the last four weeks by sexual problems? No   Do you have difficulty concentrating, remembering or making decisions? Yes       Age-appropriate Screening Schedule:  Refer to the list below for future screening recommendations based on patient's age, sex and/or medical conditions. Orders for these recommended tests are listed in the plan section. The patient has been provided with a written plan.    Health Maintenance   Topic Date Due    BMI FOLLOWUP  Never done    TDAP/TD VACCINES (1 - Tdap) Never done    COVID-19 Vaccine (6 - Pfizer series) 09/11/2024 (Originally 6/17/2023)    INFLUENZA VACCINE  10/01/2023    DXA SCAN  08/22/2024    ANNUAL WELLNESS VISIT  09/12/2024    Pneumococcal Vaccine 65+  Completed    ZOSTER VACCINE  Completed          CMS Preventative Services Quick Reference  Risk Factors Identified During Encounter  Hearing Problem:  Continue using hearing aids.  Immunizations Discussed/Encouraged: Tdap and COVID19  The above risks/problems have been discussed with the patient.  Pertinent information has been shared with the patient in the After Visit Summary.  An After Visit Summary and PPPS were made available to the patient.    Follow Up:   Next Medicare Wellness visit to be scheduled in 1 year.       Additional E&M Note during same encounter follows:  Patient has multiple medical problems which are significant and separately identifiable that require additional work above and beyond the Medicare Wellness Visit.      Chief Complaint:  Blood pressure, anxiety    Subjective        In addition to the Medicare wellness exam, Violetta is being seen today in follow-up.  She has an intermittent issue with anxiety for which she takes lorazepam sparingly - maybe 2-3 times per month.  She is not aware of obvious side effects related to this.  She denies abuse, diversion, or doctor shopping.  Christiano is reviewed.      Violetta has  "hypertension for which she remains on multiple medications as noted below.  She takes her blood pressure medications at night and states that her blood pressure has been better controlled.  She denies any significant side effects from the medications.  She has a bottle of hydralazine at home for as needed use if her blood pressure kind of shoots up.  She has not taken it for over a year though.    She has an ongoing issue with urinary frequency.  This is worse at night and is impacting her sleep.  She would be interested in possibly seeing urology for evaluation.    Review of Systems:  Review of Systems   Constitutional:  Negative for chills and fever.   Respiratory:  Negative for cough and shortness of breath.    Cardiovascular:  Negative for chest pain and palpitations.   Gastrointestinal:  Negative for abdominal pain, nausea and vomiting.      Objective   Vital Signs:  Vitals:    09/12/23 1347   BP: 107/55   BP Location: Right arm   Patient Position: Sitting   Pulse: 70   Temp: 98.4 °F (36.9 °C)   TempSrc: Oral   Weight: 78 kg (172 lb)   Height: 165.1 cm (65\")   PainSc: 0-No pain   Body mass index is 28.62 kg/m².    Physical Exam  Vitals and nursing note reviewed.   Constitutional:       General: She is not in acute distress.     Appearance: She is not ill-appearing.   HENT:      Right Ear: Tympanic membrane and ear canal normal.      Left Ear: Tympanic membrane and ear canal normal.      Ears:      Comments: Hearing is normal with forced whisper bilaterally.     Mouth/Throat:      Mouth: Mucous membranes are moist.      Comments: Pharynx appears normal  Eyes:      Extraocular Movements: Extraocular movements intact.      Pupils: Pupils are equal, round, and reactive to light.      Comments: Binocular vision is 20/20 with correction.   Neck:      Thyroid: No thyromegaly.   Cardiovascular:      Rate and Rhythm: Normal rate and regular rhythm.      Heart sounds: No murmur heard.  Pulmonary:      Effort: Pulmonary " effort is normal.      Breath sounds: Normal breath sounds.   Abdominal:      General: There is no distension.      Palpations: Abdomen is soft. There is no mass.      Tenderness: There is no abdominal tenderness.   Musculoskeletal:      Cervical back: Normal range of motion.      Right lower leg: Edema (Mild edema of both lower extremities is noted below the knee.  This is more significant on the right side.) present.      Left lower leg: Edema present.   Skin:     Findings: No lesion or rash.   Neurological:      General: No focal deficit present.      Mental Status: She is oriented to person, place, and time.      Cranial Nerves: No cranial nerve deficit.   Psychiatric:         Mood and Affect: Mood normal.     The following data was reviewed by Antoni Gonzalez MD on 09/12/2023.  Lab Results   Component Value Date    WBC 4.83 09/12/2023    HGB 11.2 (L) 09/12/2023    HCT 33.6 (L) 09/12/2023    MCV 93.9 09/12/2023     09/12/2023     Lab Results   Component Value Date    GLUCOSE 89 02/27/2023    BUN 15 02/27/2023    CREATININE 1.10 (H) 02/27/2023     02/27/2023    K 4.5 02/27/2023     02/27/2023    CO2 27.4 02/27/2023    CALCIUM 10.5 02/27/2023    PROTEINTOT 7.6 02/27/2023    ALBUMIN 4.5 02/27/2023    ALT 10 02/27/2023    AST 20 02/27/2023    ALKPHOS 49 02/27/2023    BILITOT 0.4 02/27/2023    EGFR 49.7 (L) 02/27/2023    GLOB 3.1 02/27/2023    AGRATIO 1.5 02/27/2023    BCR 13.6 02/27/2023    ANIONGAP 10.6 02/27/2023      No results found for: CHOL, CHLPL, TRIG, HDL, LDL, LDLDIRECT  Lab Results   Component Value Date    TSH 1.880 06/16/2022     Lab Results   Component Value Date    HGBA1C 5.80 (H) 09/07/2022             Assessment and Plan:       Today, we have reviewed her care.  Overall, Violetta seems to be doing well.  Regarding the Medicare wellness exam, she is basically up-to-date on recommended cancer screenings.  We also reviewed vaccines, but she will hold off on this  today.    Regarding her usual care, her blood pressure is in a good range.  We will refill needed medications for her and update labs as noted below.  Regarding anxiety, she does use sparing lorazepam.  We have previously discussed potential concerns with this medication, but it is helpful for her when needed.  No other short-term change is anticipated.    Diagnoses and all orders for this visit:    1. Physical exam (Primary)    2. Anxiety  -     LORazepam (ATIVAN) 0.5 MG tablet; Take 1 tablet by mouth 2 (Two) Times a Day As Needed for Anxiety.  Dispense: 30 tablet; Refill: 1    3. Essential hypertension  -     amLODIPine (NORVASC) 10 MG tablet; Take 1 tablet by mouth Every Night.  Dispense: 90 tablet; Refill: 3  -     atenolol (TENORMIN) 50 MG tablet; Take 1 tablet by mouth Every Night.  Dispense: 90 tablet; Refill: 3  -     losartan (COZAAR) 100 MG tablet; Take 1 tablet by mouth Every Night.  Dispense: 90 tablet; Refill: 3  -     Comprehensive Metabolic Panel; Future    4. Nocturia  -     Urinalysis With Microscopic - Urine, Clean Catch; Future  -     Ambulatory Referral to Urology    5. Other long term (current) drug therapy  -     CBC (No Diff); Future  -     TSH; Future    6. Stage 3a chronic kidney disease  -     CBC (No Diff); Future  -     Comprehensive Metabolic Panel; Future    7. Chronic venous insufficiency  Comments:  As above.       Follow Up   Return in about 6 months (around 3/12/2024) for Recheck.  Patient was given instructions and counseling regarding her condition or for health maintenance advice. Please see specific information pulled into the AVS if appropriate.

## 2023-09-13 LAB
FERRITIN SERPL-MCNC: 170 NG/ML (ref 13–150)
IRON 24H UR-MRATE: 87 MCG/DL (ref 37–145)
IRON SATN MFR SERPL: 23 % (ref 20–50)
TIBC SERPL-MCNC: 384 MCG/DL (ref 298–536)
TRANSFERRIN SERPL-MCNC: 258 MG/DL (ref 200–360)

## 2023-09-14 LAB — BACTERIA SPEC AEROBE CULT: NO GROWTH

## 2023-10-17 ENCOUNTER — HOSPITAL ENCOUNTER (OUTPATIENT)
Dept: MAMMOGRAPHY | Facility: HOSPITAL | Age: 83
Discharge: HOME | End: 2023-10-17
Admitting: FAMILY MEDICINE
Payer: MEDICARE

## 2023-10-17 DIAGNOSIS — Z12.31 SCREENING MAMMOGRAM FOR BREAST CANCER: ICD-10-CM

## 2023-10-17 PROCEDURE — 77063 BREAST TOMOSYNTHESIS BI: CPT

## 2023-10-17 PROCEDURE — 77067 SCR MAMMO BI INCL CAD: CPT

## 2023-10-20 ENCOUNTER — TELEPHONE (OUTPATIENT)
Dept: UROLOGY | Facility: CLINIC | Age: 83
End: 2023-10-20
Payer: MEDICARE

## 2023-10-20 NOTE — TELEPHONE ENCOUNTER
Called and spoke to pt to remind her of her appt with COLETTE Lyles. Verified pt's name and birthday match the chart.

## 2023-10-23 ENCOUNTER — OFFICE VISIT (OUTPATIENT)
Dept: UROLOGY | Facility: CLINIC | Age: 83
End: 2023-10-23
Payer: MEDICARE

## 2023-10-23 VITALS
SYSTOLIC BLOOD PRESSURE: 145 MMHG | WEIGHT: 186.6 LBS | HEIGHT: 65 IN | BODY MASS INDEX: 31.09 KG/M2 | DIASTOLIC BLOOD PRESSURE: 67 MMHG | HEART RATE: 73 BPM | RESPIRATION RATE: 14 BRPM

## 2023-10-23 DIAGNOSIS — R35.1 NOCTURIA: Primary | ICD-10-CM

## 2023-10-23 DIAGNOSIS — R32 URINARY INCONTINENCE, UNSPECIFIED TYPE: ICD-10-CM

## 2023-10-23 LAB
BILIRUB BLD-MCNC: NEGATIVE MG/DL
CLARITY, POC: CLEAR
COLOR UR: YELLOW
EXPIRATION DATE: ABNORMAL
GLUCOSE UR STRIP-MCNC: NEGATIVE MG/DL
KETONES UR QL: NEGATIVE
LEUKOCYTE EST, POC: NEGATIVE
Lab: ABNORMAL
NITRITE UR-MCNC: NEGATIVE MG/ML
PH UR: 6 [PH] (ref 5–8)
PROT UR STRIP-MCNC: ABNORMAL MG/DL
RBC # UR STRIP: ABNORMAL /UL
SP GR UR: 1.03 (ref 1–1.03)
UROBILINOGEN UR QL: ABNORMAL

## 2023-10-23 PROCEDURE — 81003 URINALYSIS AUTO W/O SCOPE: CPT | Performed by: NURSE PRACTITIONER

## 2023-10-23 PROCEDURE — 99213 OFFICE O/P EST LOW 20 MIN: CPT | Performed by: NURSE PRACTITIONER

## 2023-10-23 PROCEDURE — 1160F RVW MEDS BY RX/DR IN RCRD: CPT | Performed by: NURSE PRACTITIONER

## 2023-10-23 PROCEDURE — 3078F DIAST BP <80 MM HG: CPT | Performed by: NURSE PRACTITIONER

## 2023-10-23 PROCEDURE — 1159F MED LIST DOCD IN RCRD: CPT | Performed by: NURSE PRACTITIONER

## 2023-10-23 PROCEDURE — 3077F SYST BP >= 140 MM HG: CPT | Performed by: NURSE PRACTITIONER

## 2023-10-23 NOTE — PROGRESS NOTES
"Chief Complaint: Nocturia    Subjective         History of Present Illness  Violetta Starr is a 83 y.o. female presents to [facility] to be seen for nocturia.    She saw her PCP for an annual f/u  had mentioned issues with nocturia.    She states she is getting up 3-4 x a night.     She does have leakage when getting up to go most times.    She states she has daytime leakage.    She wears depends and heavy flow pads.    She drinks some tea, propel sugar free packets in her water, she denies excess soda use.    She voids every 3-4 hours during the day.     No hx of sleep apnea. She does states daytime sleepiness.     She has had a hysterectomy.    She states she had a \"bladder lift\" 30 years ago.    She has a hx of uterine cancer mother had breast CA.     On no anticoagulants.            Objective     Past Medical History:   Diagnosis Date    Anxiety     DDD (degenerative disc disease), lumbar     Endometrial cancer     Status post surgery and radiation    Essential hypertension 05/26/2016    MANAGED BY PCP    GERD (gastroesophageal reflux disease)     Insomnia     Lab test positive for detection of COVID-19 virus 07/2022    Right knee pain     WEAKNESS AND LIMITED MOBILITY    Skin abnormalities     AREA OF SKIN REMOVED ON FOREHEAD ABOUT 3 WEEKS AGO-BAND AID OVER TO PROTECT SCAB    Spinal headache     Urinary incontinence     WEARS PAD       Past Surgical History:   Procedure Laterality Date    BLADDER REPAIR      CATARACT EXTRACTION Bilateral     HYSTERECTOMY Bilateral     OOPHORECTOMY    LUMBAR LAMINECTOMY Right 05/20/2022    Procedure: MINIMALLY INVASIVE LUMBAR LAMINECTOMY, right approach, lumbar 4-lumbar 5;  Surgeon: Carmine Mchugh MD;  Location: [facility];  Service: Neurosurgery;  Laterality: Right;    REPLACEMENT TOTAL KNEE Left     TOTAL KNEE ARTHROPLASTY Right 9/9/2022    Procedure: Right total knee arthroplasty with robotics;  Surgeon: Charan Payne MD;  [facility];  Service: Robotics - Ortho;  Laterality: Right;         Current Outpatient Medications:     acetaminophen (TYLENOL) 500 MG tablet, Take 2 tablets by mouth Every 6 (Six) Hours As Needed for Mild Pain., Disp: , Rfl:     amLODIPine (NORVASC) 10 MG tablet, Take 1 tablet by mouth Every Night., Disp: 90 tablet, Rfl: 3    atenolol (TENORMIN) 50 MG tablet, Take 1 tablet by mouth Every Night., Disp: 90 tablet, Rfl: 3    cetirizine (zyrTEC) 10 MG tablet, Take 1 tablet by mouth As Needed for Allergies., Disp: , Rfl:     hydrALAZINE (APRESOLINE) 10 MG tablet, Take 1 tablet by mouth twice a day as needed for systolic blood pressure greater than 160. (Patient taking differently: As Needed. Take 1 tablet by mouth twice a day as needed for systolic blood pressure greater than 160. HAS NOT TAKEN FOR OVER A YEAR), Disp: 90 tablet, Rfl: 3    LORazepam (ATIVAN) 0.5 MG tablet, Take 1 tablet by mouth 2 (Two) Times a Day As Needed for Anxiety., Disp: 30 tablet, Rfl: 1    losartan (COZAAR) 100 MG tablet, Take 1 tablet by mouth Every Night., Disp: 90 tablet, Rfl: 3    omeprazole (priLOSEC) 40 MG capsule, Take 1 capsule by mouth Daily. (Patient taking differently: Take 1 capsule by mouth Every Night.), Disp: 90 capsule, Rfl: 3    Vibegron 75 MG tablet, Take 1 tablet by mouth Daily., Disp: 90 tablet, Rfl: 3  No current facility-administered medications for this visit.    Facility-Administered Medications Ordered in Other Visits:     Chlorhexidine Gluconate Cloth 2 % pads, , Apply externally, Take As Directed, Charan Payne MD    Allergies   Allergen Reactions    Amlodipine-Olmesartan Itching    Benazepril Rash        Family History   Problem Relation Age of Onset    Breast cancer Mother     Stroke Father     Malig Hyperthermia Neg Hx        Social History     Socioeconomic History    Marital status:     Number of children: 3   Tobacco Use    Smoking status: Never     Passive exposure: Past    Smokeless tobacco: Never   Vaping Use  "   Vaping Use: Never used   Substance and Sexual Activity    Alcohol use: Never    Drug use: Never    Sexual activity: Defer       Vital Signs:   /67 (BP Location: Left arm, Patient Position: Sitting)   Pulse 73   Resp 14   Ht 165.1 cm (65\")   Wt 84.6 kg (186 lb 9.6 oz)   BMI 31.05 kg/m²      Physical Exam     Result Review :   The following data was reviewed by: COLETTE Church on 10/23/2023:  Results for orders placed or performed in visit on 10/23/23   POC Urinalysis Dipstick, Automated    Specimen: Urine   Result Value Ref Range    Color Yellow Yellow, Straw, Dark Yellow, Lauren    Clarity, UA Clear Clear    Specific Gravity  1.030 1.005 - 1.030    pH, Urine 6.0 5.0 - 8.0    Leukocytes Negative Negative    Nitrite, UA Negative Negative    Protein, POC Trace (A) Negative mg/dL    Glucose, UA Negative Negative mg/dL    Ketones, UA Negative Negative    Urobilinogen, UA 0.2 E.U./dL Normal, 0.2 E.U./dL    Bilirubin Negative Negative    Blood, UA Trace (A) Negative    Lot Number [id number]     Expiration Date 2,024/9             Procedures        Assessment and Plan    Diagnoses and all orders for this visit:    1. Nocturia (Primary)  -     POC Urinalysis Dipstick, Automated  -     Vibegron 75 MG tablet; Take 1 tablet by mouth Daily.  Dispense: 90 tablet; Refill: 3    2. Urinary incontinence, unspecified type  -     Vibegron 75 MG tablet; Take 1 tablet by mouth Daily.  Dispense: 90 tablet; Refill: 3      We will give her samples of gemtesa and f/u in 6 weeks or sooner if needed.      Discussed with patient that nocturia is a common condition that is multifactorial in nature and can be difficult to treat, unlikely to completely irradicate, and management is dictated by patient motivation to improve and cope with symptoms.  discussed with patient, recommended behavioral modifications including fluid management, limiting fluids prior to sleep, and voiding immediately prior to sleep. Recommended that patient " lay supine in the afternoon with feet above heart level to assist wtih mobilizing dependent edema which typically occurs while supine during sleep. Also, although no history of sleep apnea, could consider workup as studies have shown that with treatment of sleep apnea, nocuria can be significantly reduced. All questions addressed.    I spent 15 minutes caring for Violetta on this date of service. This time includes time spent by me in the following activities:reviewing tests, obtaining and/or reviewing a separately obtained history, performing a medically appropriate examination and/or evaluation , counseling and educating the patient/family/caregiver, ordering medications, tests, or procedures, and documenting information in the medical record  Follow Up   Return in about 8 weeks (around 12/18/2023) for f/u nocturia .  Patient was given instructions and counseling regarding her condition or for health maintenance advice. Please see specific information pulled into the AVS if appropriate.         This document has been electronically signed by COLETTE Church  October 23, 2023 10:15 EDT

## 2023-10-26 ENCOUNTER — TELEPHONE (OUTPATIENT)
Dept: UROLOGY | Facility: CLINIC | Age: 83
End: 2023-10-26
Payer: MEDICARE

## 2023-10-26 NOTE — TELEPHONE ENCOUNTER
I spoke to pt regarding medication.  I will get her some samples of Gemtesa to .  Pt will pick them up from the [place] office.  Pt is of understanding.

## 2023-10-26 NOTE — TELEPHONE ENCOUNTER
PT SAW OLE ON MONDAY AND WAS PRESCRIBED GEMTESA. IT IS TOO EXPENSIVE-$379 FOR A MONTH. SHE THOUGHT THAT SHE WAS GOING TO GET SAMPLES BUT SHE WAS NOT GIVEN ANY. SHE IS ASKING IF SHE CAN GET SOME.

## 2023-12-18 ENCOUNTER — OFFICE VISIT (OUTPATIENT)
Dept: UROLOGY | Facility: CLINIC | Age: 83
End: 2023-12-18
Payer: MEDICARE

## 2023-12-18 VITALS
OXYGEN SATURATION: 98 % | BODY MASS INDEX: 30.35 KG/M2 | DIASTOLIC BLOOD PRESSURE: 80 MMHG | HEIGHT: 65 IN | SYSTOLIC BLOOD PRESSURE: 132 MMHG | WEIGHT: 182.2 LBS | HEART RATE: 79 BPM

## 2023-12-18 DIAGNOSIS — R35.1 NOCTURIA: Primary | ICD-10-CM

## 2023-12-18 DIAGNOSIS — R32 URINARY INCONTINENCE, UNSPECIFIED TYPE: ICD-10-CM

## 2023-12-18 NOTE — PROGRESS NOTES
"Chief Complaint: Nocturia and Follow-up    Subjective         History of Present Illness  Violetta Starr is a 83 y.o. female presents to [facility] to be seen for f/u nocturia.    At last visit we started patient on Gemtesa to see if this would help with some of her nocturia.    The patient had called the office stating that the Gemtesa was too costly but had wanted to try some samples.    She took this medicine for about 9 weeks has not had any for 2 weeks.    She states that her nocturia had not changed on the medication.    She states shew feels like her flow was a little stronger on the Gemtesa.     She states leakage during the day and night.        Previous:  She saw her PCP for an annual f/u  had mentioned issues with nocturia.    She states she is getting up 3-4 x a night.     She does have leakage when getting up to go most times.    She states she has daytime leakage.    She wears depends and heavy flow pads.    She drinks some tea, propel sugar free packets in her water, she denies excess soda use.    She voids every 3-4 hours during the day.     No hx of sleep apnea. She does states daytime sleepiness.     She has had a hysterectomy.    She states she had a \"bladder lift\" 30 years ago.    She has a hx of uterine cancer mother had breast CA.     On no anticoagulants.            Objective     Past Medical History:   Diagnosis Date    Anxiety     DDD (degenerative disc disease), lumbar     Endometrial cancer     Status post surgery and radiation    Essential hypertension 05/26/2016    MANAGED BY PCP    GERD (gastroesophageal reflux disease)     Insomnia     Lab test positive for detection of COVID-19 virus 07/2022    Right knee pain     WEAKNESS AND LIMITED MOBILITY    Skin abnormalities     AREA OF SKIN REMOVED ON FOREHEAD ABOUT 3 WEEKS AGO-BAND AID OVER TO PROTECT SCAB    Spinal headache     Urinary incontinence     WEARS PAD       Past Surgical History:   Procedure Laterality " Date    BLADDER REPAIR      CATARACT EXTRACTION Bilateral     HYSTERECTOMY Bilateral     OOPHORECTOMY    LUMBAR LAMINECTOMY Right 05/20/2022    Procedure: MINIMALLY INVASIVE LUMBAR LAMINECTOMY, right approach, lumbar 4-lumbar 5;  Surgeon: Carmine Mchugh MD;  Location: [facility] MAIN OR;  Service: Neurosurgery;  Laterality: Right;    REPLACEMENT TOTAL KNEE Left     TOTAL KNEE ARTHROPLASTY Right 9/9/2022    Procedure: Right total knee arthroplasty with robotics;  Surgeon: Charan Payne MD;  [facility];  Service: Robotics - Ortho;  Laterality: Right;         Current Outpatient Medications:     acetaminophen (TYLENOL) 500 MG tablet, Take 2 tablets by mouth Every 6 (Six) Hours As Needed for Mild Pain., Disp: , Rfl:     amLODIPine (NORVASC) 10 MG tablet, Take 1 tablet by mouth Every Night., Disp: 90 tablet, Rfl: 3    atenolol (TENORMIN) 50 MG tablet, Take 1 tablet by mouth Every Night., Disp: 90 tablet, Rfl: 3    cetirizine (zyrTEC) 10 MG tablet, Take 1 tablet by mouth As Needed for Allergies., Disp: , Rfl:     hydrALAZINE (APRESOLINE) 10 MG tablet, Take 1 tablet by mouth twice a day as needed for systolic blood pressure greater than 160. (Patient taking differently: As Needed. Take 1 tablet by mouth twice a day as needed for systolic blood pressure greater than 160. HAS NOT TAKEN FOR OVER A YEAR), Disp: 90 tablet, Rfl: 3    LORazepam (ATIVAN) 0.5 MG tablet, Take 1 tablet by mouth 2 (Two) Times a Day As Needed for Anxiety., Disp: 30 tablet, Rfl: 1    losartan (COZAAR) 100 MG tablet, Take 1 tablet by mouth Every Night., Disp: 90 tablet, Rfl: 3    omeprazole (priLOSEC) 40 MG capsule, Take 1 capsule by mouth Daily. (Patient taking differently: Take 1 capsule by mouth Every Night.), Disp: 90 capsule, Rfl: 3    Mirabegron ER (Myrbetriq) 25 MG tablet sustained-release 24 hour 24 hr tablet, Take 1 tablet by mouth Daily., Disp: 90 tablet, Rfl: 3  No current facility-administered medications for this  "visit.    Facility-Administered Medications Ordered in Other Visits:     Chlorhexidine Gluconate Cloth 2 % pads, , Apply externally, Take As Directed, Charan Payne MD    Allergies   Allergen Reactions    Amlodipine-Olmesartan Itching    Benazepril Rash        Family History   Problem Relation Age of Onset    Breast cancer Mother     Stroke Father     Malig Hyperthermia Neg Hx        Social History     Socioeconomic History    Marital status:     Number of children: 3   Tobacco Use    Smoking status: Never     Passive exposure: Past    Smokeless tobacco: Never   Vaping Use    Vaping Use: Never used   Substance and Sexual Activity    Alcohol use: Never    Drug use: Never    Sexual activity: Defer       Vital Signs:   /80   Pulse 79   Ht 165.1 cm (65\")   Wt 82.6 kg (182 lb 3.2 oz)   SpO2 98%   BMI 30.32 kg/m²      Physical Exam     Result Review :   The following data was reviewed by: COLETTE Church on 10/23/2023:  Results for orders placed or performed in visit on 10/23/23   POC Urinalysis Dipstick, Automated    Specimen: Urine   Result Value Ref Range    Color Yellow Yellow, Straw, Dark Yellow, Lauren    Clarity, UA Clear Clear    Specific Gravity  1.030 1.005 - 1.030    pH, Urine 6.0 5.0 - 8.0    Leukocytes Negative Negative    Nitrite, UA Negative Negative    Protein, POC Trace (A) Negative mg/dL    Glucose, UA Negative Negative mg/dL    Ketones, UA Negative Negative    Urobilinogen, UA 0.2 E.U./dL Normal, 0.2 E.U./dL    Bilirubin Negative Negative    Blood, UA Trace (A) Negative    Lot Number [id number]     Expiration Date 2,024/9             Procedures        Assessment and Plan    Diagnoses and all orders for this visit:    1. Nocturia (Primary)  -     Mirabegron ER (Myrbetriq) 25 MG tablet sustained-release 24 hour 24 hr tablet; Take 1 tablet by mouth Daily.  Dispense: 90 tablet; Refill: 3    2. Urinary incontinence, unspecified type  -     Mirabegron ER (Myrbetriq) 25 MG tablet " sustained-release 24 hour 24 hr tablet; Take 1 tablet by mouth Daily.  Dispense: 90 tablet; Refill: 3      We will try her on myrbetriq to see if this will further help her symptoms.    Due to her age she is at higher risk for cognitive decline associated with  anticholinergic medication.      I spent 10 minutes caring for Violetta on this date of service. This time includes time spent by me in the following activities:reviewing tests, obtaining and/or reviewing a separately obtained history, performing a medically appropriate examination and/or evaluation , counseling and educating the patient/family/caregiver, ordering medications, tests, or procedures, and documenting information in the medical record  Follow Up   Return in about 10 weeks (around 2/26/2024) for f/u oab with myrbetriq .  Patient was given instructions and counseling regarding her condition or for health maintenance advice. Please see specific information pulled into the AVS if appropriate.         This document has been electronically signed by COLETTE Church  December 18, 2023 11:30 EST

## 2024-01-15 ENCOUNTER — TELEPHONE (OUTPATIENT)
Dept: FAMILY MEDICINE CLINIC | Age: 84
End: 2024-01-15
Payer: MEDICARE

## 2024-01-15 NOTE — TELEPHONE ENCOUNTER
Pt called c/o L arm pain/chest pain off and on X 1 week.  No SOA or any other symptoms.  I instructed pt she should go to ER for a cardiac evaluation and she said she would just watch it a few more days since she currently did not have any symptoms.  I made her an appt for in the morning and told her if she had any more pains tonight to go to ER and she agreed that she would.

## 2024-01-16 ENCOUNTER — HOSPITAL ENCOUNTER (OUTPATIENT)
Dept: GENERAL RADIOLOGY | Facility: HOSPITAL | Age: 84
Discharge: HOME | End: 2024-01-16
Admitting: FAMILY MEDICINE
Payer: MEDICARE

## 2024-01-16 ENCOUNTER — OFFICE VISIT (OUTPATIENT)
Dept: FAMILY MEDICINE CLINIC | Age: 84
End: 2024-01-16
Payer: MEDICARE

## 2024-01-16 VITALS
TEMPERATURE: 97.3 F | HEIGHT: 65 IN | BODY MASS INDEX: 29.79 KG/M2 | DIASTOLIC BLOOD PRESSURE: 77 MMHG | SYSTOLIC BLOOD PRESSURE: 134 MMHG | HEART RATE: 88 BPM | WEIGHT: 178.8 LBS

## 2024-01-16 DIAGNOSIS — R07.9 CHEST PAIN, UNSPECIFIED TYPE: ICD-10-CM

## 2024-01-16 DIAGNOSIS — R07.9 CHEST PAIN, UNSPECIFIED TYPE: Primary | ICD-10-CM

## 2024-01-16 PROCEDURE — 3078F DIAST BP <80 MM HG: CPT | Performed by: FAMILY MEDICINE

## 2024-01-16 PROCEDURE — 1159F MED LIST DOCD IN RCRD: CPT | Performed by: FAMILY MEDICINE

## 2024-01-16 PROCEDURE — 71046 X-RAY EXAM CHEST 2 VIEWS: CPT

## 2024-01-16 PROCEDURE — 93000 ELECTROCARDIOGRAM COMPLETE: CPT | Performed by: FAMILY MEDICINE

## 2024-01-16 PROCEDURE — 3075F SYST BP GE 130 - 139MM HG: CPT | Performed by: FAMILY MEDICINE

## 2024-01-16 PROCEDURE — 1160F RVW MEDS BY RX/DR IN RCRD: CPT | Performed by: FAMILY MEDICINE

## 2024-01-16 PROCEDURE — 99214 OFFICE O/P EST MOD 30 MIN: CPT | Performed by: FAMILY MEDICINE

## 2024-01-16 RX ORDER — PREDNISONE 20 MG/1
TABLET ORAL
Qty: 10 TABLET | Refills: 0 | Status: SHIPPED | OUTPATIENT
Start: 2024-01-16

## 2024-01-16 NOTE — PROGRESS NOTES
"Violetta Starr presents to [facility].    Chief Complaint:  Chest pain    Subjective   History of Present Illness:  Violetta is being seen today for evaluation of left sided chest pain.  She has noted this for the last 4-6 weeks.  This is happening 3-4 times per day.  The pain is located in the left mid chest.  She does have some radiating pain to the left arm.  She would estimate the pain to be between 3-4/10.  She has also noted a rare pain in the left mid back near the shoulder blade.  The pain can last up to 5 minutes or so.  There is not necessarily any trigger for this pain.  There is no obvious aggravating or alleviating factor.  She denies shortness of breath.  She has had 2 \"hot flashes\" in the last few days.  She feels sweaty when this happens.  This did not necessarily seem associated with pain.    Review of Systems:  Review of Systems   Constitutional:  Negative for chills and fever.   Respiratory:  Positive for cough (I have some mucus and some cough). Negative for shortness of breath.    Cardiovascular:  Positive for chest pain. Negative for palpitations.   Gastrointestinal:  Negative for abdominal pain, nausea and vomiting.      Objective   Medical History:  Past Medical History:    Anxiety    DDD (degenerative disc disease), lumbar    Endometrial cancer    Status post surgery and radiation    Essential hypertension    MANAGED BY PCP    GERD (gastroesophageal reflux disease)    Insomnia    Lab test positive for detection of COVID-19 virus    Right knee pain    WEAKNESS AND LIMITED MOBILITY    Skin abnormalities    AREA OF SKIN REMOVED ON FOREHEAD ABOUT 3 WEEKS AGO-BAND AID OVER TO PROTECT SCAB    Spinal headache    Urinary incontinence    WEARS PAD     Past Surgical History:    BLADDER REPAIR    CATARACT EXTRACTION    HYSTERECTOMY    OOPHORECTOMY    LUMBAR LAMINECTOMY    Procedure: MINIMALLY INVASIVE LUMBAR LAMINECTOMY, right approach, lumbar 4-lumbar 5;  Surgeon: Jeison" MD Carmine;  Location: [facility] MAIN OR;  Service: Neurosurgery;  Laterality: Right;    REPLACEMENT TOTAL KNEE    TOTAL KNEE ARTHROPLASTY    Procedure: Right total knee arthroplasty with robotics;  Surgeon: Charan Payne MD;  Location:  [facility];  Service: Robotics - Ortho;  Laterality: Right;      Family History   Problem Relation Age of Onset    Breast cancer Mother     Stroke Father     Malig Hyperthermia Neg Hx      Social History     Tobacco Use    Smoking status: Never     Passive exposure: Past    Smokeless tobacco: Never   Substance Use Topics    Alcohol use: Never       Health Maintenance Due   Topic Date Due    TDAP/TD VACCINES (1 - Tdap) Never done    INFLUENZA VACCINE  08/01/2023        Immunization History   Administered Date(s) Administered    COVID-19 (PFIZER) BIVALENT 12+YRS 02/17/2023    COVID-19 (PFIZER) Purple Cap Monovalent 03/08/2021, 03/29/2021, 10/17/2021    Covid-19 (Pfizer) Gray Cap Monovalent 07/27/2022    Fluzone High Dose =>65 Years ([employer] ONLY) 01/07/2020, 11/11/2020    Fluzone High-Dose 65+yrs 11/11/2020, 10/17/2021    Influenza, Unspecified 01/01/2018    Pneumococcal Conjugate 13-Valent (PCV13) 03/13/2015    Pneumococcal Polysaccharide (PPSV23) 02/11/2014    Shingrix 02/17/2023, 06/12/2023       Allergies   Allergen Reactions    Amlodipine-Olmesartan Itching    Benazepril Rash        Medications:  Current Outpatient Medications on File Prior to Visit   Medication Sig    acetaminophen (TYLENOL) 500 MG tablet Take 2 tablets by mouth Every 6 (Six) Hours As Needed for Mild Pain.    amLODIPine (NORVASC) 10 MG tablet Take 1 tablet by mouth Every Night.    atenolol (TENORMIN) 50 MG tablet Take 1 tablet by mouth Every Night.    cetirizine (zyrTEC) 10 MG tablet Take 1 tablet by mouth As Needed for Allergies.    hydrALAZINE (APRESOLINE) 10 MG tablet Take 1 tablet by mouth twice a day as needed for systolic blood pressure greater than 160. (Patient taking differently: As Needed. Take 1 tablet  "by mouth twice a day as needed for systolic blood pressure greater than 160.  HAS NOT TAKEN FOR OVER A YEAR)    LORazepam (ATIVAN) 0.5 MG tablet Take 1 tablet by mouth 2 (Two) Times a Day As Needed for Anxiety.    losartan (COZAAR) 100 MG tablet Take 1 tablet by mouth Every Night.    Mirabegron ER (Myrbetriq) 25 MG tablet sustained-release 24 hour 24 hr tablet Take 1 tablet by mouth Daily.    omeprazole (priLOSEC) 40 MG capsule Take 1 capsule by mouth Daily. (Patient taking differently: Take 1 capsule by mouth Every Night.)    [DISCONTINUED] Mirabegron ER (Myrbetriq) 25 MG tablet sustained-release 24 hour 24 hr tablet Take 1 tablet by mouth Daily.    [DISCONTINUED] rivaroxaban (XARELTO) 10 MG tablet Take 1 tablet by mouth Daily.     Current Facility-Administered Medications on File Prior to Visit   Medication    Chlorhexidine Gluconate Cloth 2 % pads       Vital Signs:   /77 (BP Location: Left arm, Patient Position: Sitting)   Pulse 88   Temp 97.3 °F (36.3 °C) (Oral)   Ht 165 cm (64.96\")   Wt 81.1 kg (178 lb 12.8 oz)   BMI 29.79 kg/m²       Physical Exam:  Physical Exam  Vitals reviewed.   Constitutional:       General: She is not in acute distress.     Appearance: She is not ill-appearing.   Eyes:      Pupils: Pupils are equal, round, and reactive to light.   Neck:      Comments: No thyromegaly  Cardiovascular:      Rate and Rhythm: Normal rate and regular rhythm.   Pulmonary:      Effort: Pulmonary effort is normal.      Breath sounds: Normal breath sounds.   Chest:      Chest wall: Tenderness (There is mild discomfort over the left anterior chest in the area of the third rib/costochondral junction.) present.   Abdominal:      General: There is no distension.      Palpations: Abdomen is soft.      Tenderness: There is no abdominal tenderness.   Musculoskeletal:      Cervical back: Neck supple.   Lymphadenopathy:      Cervical: No cervical adenopathy.   Skin:     Findings: No lesion or rash. " "  Neurological:      Mental Status: She is alert.     Result Review   The following data was reviewed by Antoni Gonzalez MD on 01/16/2024.  Lab Results   Component Value Date    WBC 4.83 09/12/2023    HGB 11.2 (L) 09/12/2023    HCT 33.6 (L) 09/12/2023    MCV 93.9 09/12/2023     09/12/2023     Lab Results   Component Value Date    GLUCOSE 98 09/12/2023    BUN 18 09/12/2023    CREATININE 0.97 09/12/2023     09/12/2023    K 4.1 09/12/2023     09/12/2023    CO2 24.1 09/12/2023    CALCIUM 9.6 09/12/2023    PROTEINTOT 7.0 09/12/2023    ALBUMIN 4.3 09/12/2023    ALT 16 09/12/2023    AST 25 09/12/2023    ALKPHOS 44 09/12/2023    BILITOT 0.2 09/12/2023    EGFR 57.4 (L) 09/12/2023    GLOB 2.7 09/12/2023    AGRATIO 1.6 09/12/2023    BCR 18.6 09/12/2023    ANIONGAP 10.9 09/12/2023      No results found for: \"CHOL\", \"CHLPL\", \"TRIG\", \"HDL\", \"LDL\", \"LDLDIRECT\"  Lab Results   Component Value Date    TSH 2.640 09/12/2023     Lab Results   Component Value Date    HGBA1C 5.80 (H) 09/07/2022     ECG 12 Lead    Date/Time: 1/16/2024 1:52 PM  Performed by: Antoni Gonzalez MD    Authorized by: Antoni Gonzalez MD  Comparison: compared with previous ECG from 9/7/2022  Similar to previous ECG  Rhythm: sinus rhythm  Rate: normal  BPM: 68  Conduction: conduction normal  ST Segments: ST segments normal  T Waves: T waves normal  T flattening: III  QRS axis: left    Clinical impression: normal ECG  Clinical impression comment: This is an essentially normal EKG without obvious change compared to most recent tracing.         Assessment and Plan:   Today, we have reviewed her care.  Her exam is reassuring for the most part.  Her lungs are clear, and her heart sounds normal.  EKG does not show any obvious change compared to most recent tracing.  The seems atypical for cardiac pain.  We will move ahead with a chest x-ray as a precaution and reach back out to her.  Given some involvement of the upper back, we " may want to consider CT if this persists.  We will reach back out to her tomorrow after the chest x-ray results are back.    Diagnoses and all orders for this visit:    1. Chest pain, unspecified type (Primary)  -     ECG 12 Lead  -     XR Chest 2 View; Future    Follow Up  Return for Next scheduled follow up.  Patient was given instructions and counseling regarding her condition or for health maintenance advice. Please see specific information pulled into the AVS if appropriate.

## 2024-01-17 DIAGNOSIS — R20.0 NUMBNESS OF LEFT HAND: Primary | ICD-10-CM

## 2024-02-01 ENCOUNTER — HOSPITAL ENCOUNTER (OUTPATIENT)
Facility: HOSPITAL | Age: 84
Discharge: HOME | End: 2024-02-01
Admitting: FAMILY MEDICINE
Payer: MEDICARE

## 2024-02-01 DIAGNOSIS — R20.0 NUMBNESS OF LEFT HAND: ICD-10-CM

## 2024-02-01 PROCEDURE — 95886 MUSC TEST DONE W/N TEST COMP: CPT

## 2024-02-01 PROCEDURE — 95909 NRV CNDJ TST 5-6 STUDIES: CPT

## 2024-02-27 ENCOUNTER — TELEPHONE (OUTPATIENT)
Dept: UROLOGY | Facility: CLINIC | Age: 84
End: 2024-02-27
Payer: MEDICARE

## 2024-02-27 NOTE — TELEPHONE ENCOUNTER
CALLED PT TO RS CX APPT W/OLE/PT SAID THAT THE MEDS DON'T WORK AND SHE DOES NOT WANT TO RS/ANYTHING ELSE TO DO??

## 2024-03-12 ENCOUNTER — LAB (OUTPATIENT)
Dept: LAB | Facility: HOSPITAL | Age: 84
End: 2024-03-12
Payer: MEDICARE

## 2024-03-12 ENCOUNTER — OFFICE VISIT (OUTPATIENT)
Dept: FAMILY MEDICINE CLINIC | Age: 84
End: 2024-03-12
Payer: MEDICARE

## 2024-03-12 VITALS
WEIGHT: 179 LBS | DIASTOLIC BLOOD PRESSURE: 74 MMHG | HEIGHT: 65 IN | TEMPERATURE: 97.8 F | BODY MASS INDEX: 29.82 KG/M2 | SYSTOLIC BLOOD PRESSURE: 115 MMHG | HEART RATE: 61 BPM | OXYGEN SATURATION: 97 %

## 2024-03-12 DIAGNOSIS — Z79.899 OTHER LONG TERM (CURRENT) DRUG THERAPY: ICD-10-CM

## 2024-03-12 DIAGNOSIS — R73.9 HYPERGLYCEMIA: ICD-10-CM

## 2024-03-12 DIAGNOSIS — F41.9 ANXIETY: Primary | ICD-10-CM

## 2024-03-12 DIAGNOSIS — I10 ESSENTIAL HYPERTENSION: ICD-10-CM

## 2024-03-12 DIAGNOSIS — K21.9 GASTROESOPHAGEAL REFLUX DISEASE WITHOUT ESOPHAGITIS: ICD-10-CM

## 2024-03-12 DIAGNOSIS — N18.31 STAGE 3A CHRONIC KIDNEY DISEASE: ICD-10-CM

## 2024-03-12 LAB
ANION GAP SERPL CALCULATED.3IONS-SCNC: 13.3 MMOL/L (ref 5–15)
BUN SERPL-MCNC: 17 MG/DL (ref 8–23)
BUN/CREAT SERPL: 16.7 (ref 7–25)
CALCIUM SPEC-SCNC: 9.5 MG/DL (ref 8.6–10.5)
CHLORIDE SERPL-SCNC: 104 MMOL/L (ref 98–107)
CO2 SERPL-SCNC: 25.7 MMOL/L (ref 22–29)
CREAT SERPL-MCNC: 1.02 MG/DL (ref 0.57–1)
DEPRECATED RDW RBC AUTO: 44.7 FL (ref 37–54)
EGFRCR SERPLBLD CKD-EPI 2021: 54 ML/MIN/1.73
ERYTHROCYTE [DISTWIDTH] IN BLOOD BY AUTOMATED COUNT: 12.6 % (ref 12.3–15.4)
GLUCOSE SERPL-MCNC: 121 MG/DL (ref 65–99)
HCT VFR BLD AUTO: 36.2 % (ref 34–46.6)
HGB BLD-MCNC: 11.9 G/DL (ref 12–15.9)
MCH RBC QN AUTO: 31.3 PG (ref 26.6–33)
MCHC RBC AUTO-ENTMCNC: 32.9 G/DL (ref 31.5–35.7)
MCV RBC AUTO: 95.3 FL (ref 79–97)
PLATELET # BLD AUTO: 190 10*3/MM3 (ref 140–450)
PMV BLD AUTO: 10.9 FL (ref 6–12)
POTASSIUM SERPL-SCNC: 4 MMOL/L (ref 3.5–5.2)
RBC # BLD AUTO: 3.8 10*6/MM3 (ref 3.77–5.28)
SODIUM SERPL-SCNC: 143 MMOL/L (ref 136–145)
WBC NRBC COR # BLD AUTO: 4.75 10*3/MM3 (ref 3.4–10.8)

## 2024-03-12 PROCEDURE — 85027 COMPLETE CBC AUTOMATED: CPT

## 2024-03-12 PROCEDURE — 83036 HEMOGLOBIN GLYCOSYLATED A1C: CPT | Performed by: FAMILY MEDICINE

## 2024-03-12 PROCEDURE — 80048 BASIC METABOLIC PNL TOTAL CA: CPT

## 2024-03-12 PROCEDURE — 36415 COLL VENOUS BLD VENIPUNCTURE: CPT

## 2024-03-12 RX ORDER — AMLODIPINE BESYLATE 10 MG/1
10 TABLET ORAL NIGHTLY
Qty: 90 TABLET | Refills: 3 | Status: SHIPPED | OUTPATIENT
Start: 2024-03-12

## 2024-03-12 RX ORDER — OMEPRAZOLE 40 MG/1
40 CAPSULE, DELAYED RELEASE ORAL NIGHTLY
Qty: 90 CAPSULE | Refills: 3 | Status: SHIPPED | OUTPATIENT
Start: 2024-03-12

## 2024-03-12 RX ORDER — ATENOLOL 50 MG/1
50 TABLET ORAL NIGHTLY
Qty: 90 TABLET | Refills: 3 | Status: SHIPPED | OUTPATIENT
Start: 2024-03-12

## 2024-03-12 RX ORDER — LORAZEPAM 0.5 MG/1
0.5 TABLET ORAL 2 TIMES DAILY PRN
Qty: 30 TABLET | Refills: 1 | Status: SHIPPED | OUTPATIENT
Start: 2024-03-12

## 2024-03-12 RX ORDER — LOSARTAN POTASSIUM 100 MG/1
100 TABLET ORAL NIGHTLY
Qty: 90 TABLET | Refills: 3 | Status: SHIPPED | OUTPATIENT
Start: 2024-03-12

## 2024-03-12 NOTE — PROGRESS NOTES
Violetta Starr presents to [facility].    Chief Complaint:  Anxiety, blood pressure    Subjective   History of Present Illness:  Violetta is being seen today in follow-up.  She has an intermittent issue with anxiety for which she takes lorazepam sparingly - maybe once every 2-3 weeks.  She is not aware of obvious side effects related to this.  She denies abuse, diversion, or doctor shopping.  Christiano is reviewed.      Violetta also has hypertension for which she remains on multiple medications as noted below.  She takes her blood pressure medications at night and states that her blood pressure has been fairly well controlled.  She did have an episode recently where it was somewhat elevated.  She denies any significant side effects from the medications.  She has a bottle of hydralazine at home for as needed use if her blood pressure kind of shoots up.  She has not taken it for over a year though.    Review of Systems:  Review of Systems   Constitutional:  Negative for chills and fever.   Respiratory:  Negative for cough and shortness of breath.    Cardiovascular:  Negative for chest pain and palpitations.   Gastrointestinal:  Negative for abdominal pain, nausea and vomiting.        Objective   Medical History:  Past Medical History:    Anxiety    DDD (degenerative disc disease), lumbar    Endometrial cancer    Status post surgery and radiation    Essential hypertension    MANAGED BY PCP    GERD (gastroesophageal reflux disease)    Insomnia    Lab test positive for detection of COVID-19 virus    Right knee pain    WEAKNESS AND LIMITED MOBILITY    Skin abnormalities    AREA OF SKIN REMOVED ON FOREHEAD ABOUT 3 WEEKS AGO-BAND AID OVER TO PROTECT SCAB    Spinal headache    Urinary incontinence    WEARS PAD     Past Surgical History:    BLADDER REPAIR    CATARACT EXTRACTION    HYSTERECTOMY    OOPHORECTOMY    LUMBAR LAMINECTOMY    Procedure: MINIMALLY INVASIVE LUMBAR LAMINECTOMY, right approach,  lumbar 4-lumbar 5;  Surgeon: Carmine Mchugh MD;  Location: [facility] MA[facility];  Service: Neurosurgery;  Laterality: Right;    REPLACEMENT TOTAL KNEE    TOTAL KNEE ARTHROPLASTY    Procedure: Right total knee arthroplasty with robotics;  Surgeon: Charan Payne MD;  Location:  [facility];  Service: Robotics - Ortho;  Laterality: Right;      Family History   Problem Relation Age of Onset    Breast cancer Mother     Stroke Father     Malig Hyperthermia Neg Hx      Social History     Tobacco Use    Smoking status: Never     Passive exposure: Past    Smokeless tobacco: Never   Substance Use Topics    Alcohol use: Never       Health Maintenance Due   Topic Date Due    TDAP/TD VACCINES (1 - Tdap) Never done        Immunization History   Administered Date(s) Administered    COVID-19 (PFIZER) BIVALENT 12+YRS 02/17/2023    COVID-19 (PFIZER) Purple Cap Monovalent 03/08/2021, 03/29/2021, 10/17/2021    Covid-19 (Pfizer) Gray Cap Monovalent 07/27/2022    Fluzone High Dose =>65 Years ([employer] ONLY) 01/07/2020, 11/11/2020    Fluzone High-Dose 65+yrs 11/11/2020, 10/17/2021    Influenza, Unspecified 01/01/2018    Pneumococcal Conjugate 13-Valent (PCV13) 03/13/2015    Pneumococcal Polysaccharide (PPSV23) 02/11/2014    Shingrix 02/17/2023, 06/12/2023       Allergies   Allergen Reactions    Amlodipine-Olmesartan Itching    Benazepril Rash        Medications:  Current Outpatient Medications on File Prior to Visit   Medication Sig    acetaminophen (TYLENOL) 500 MG tablet Take 2 tablets by mouth Every 6 (Six) Hours As Needed for Mild Pain.    cetirizine (zyrTEC) 10 MG tablet Take 1 tablet by mouth As Needed for Allergies.    hydrALAZINE (APRESOLINE) 10 MG tablet Take 1 tablet by mouth twice a day as needed for systolic blood pressure greater than 160. (Patient taking differently: As Needed. Take 1 tablet by mouth twice a day as needed for systolic blood pressure greater than 160.  HAS NOT TAKEN FOR OVER A YEAR)    Mirabegron ER (Myrbetriq) 25  "MG tablet sustained-release 24 hour 24 hr tablet Take 1 tablet by mouth Daily.    [DISCONTINUED] amLODIPine (NORVASC) 10 MG tablet Take 1 tablet by mouth Every Night.    [DISCONTINUED] atenolol (TENORMIN) 50 MG tablet Take 1 tablet by mouth Every Night.    [DISCONTINUED] LORazepam (ATIVAN) 0.5 MG tablet Take 1 tablet by mouth 2 (Two) Times a Day As Needed for Anxiety.    [DISCONTINUED] losartan (COZAAR) 100 MG tablet Take 1 tablet by mouth Every Night.    [DISCONTINUED] omeprazole (priLOSEC) 40 MG capsule Take 1 capsule by mouth Daily. (Patient taking differently: Take 1 capsule by mouth Every Night.)    [DISCONTINUED] predniSONE (DELTASONE) 20 MG tablet Take 2 tablets by oral route once daily (Patient not taking: Reported on 3/12/2024)    [DISCONTINUED] rivaroxaban (XARELTO) 10 MG tablet Take 1 tablet by mouth Daily.     Current Facility-Administered Medications on File Prior to Visit   Medication    Chlorhexidine Gluconate Cloth 2 % pads       Vital Signs:   Vitals:    03/12/24 0824 03/12/24 0851 03/12/24 0852   BP: 99/66 118/72 115/74   BP Location: Left arm Left arm Right arm   Patient Position: Sitting Sitting Sitting   Cuff Size: Large Adult     Pulse: 61     Temp: 97.8 °F (36.6 °C)     TempSrc: Oral     SpO2: 97%     Weight: 81.2 kg (179 lb)     Height: 165 cm (64.96\")     Body mass index is 29.82 kg/m².    Physical Exam:  Physical Exam  Vitals reviewed.   Constitutional:       General: She is not in acute distress.     Appearance: She is not ill-appearing.   Eyes:      Pupils: Pupils are equal, round, and reactive to light.   Neck:      Comments: No thyromegaly  Cardiovascular:      Rate and Rhythm: Normal rate and regular rhythm.   Pulmonary:      Effort: Pulmonary effort is normal.      Breath sounds: Normal breath sounds.   Abdominal:      General: There is no distension.      Palpations: Abdomen is soft.      Tenderness: There is no abdominal tenderness.   Musculoskeletal:      Cervical back: Neck " "supple.      Right lower leg: Edema (Mild) present.      Left lower leg: Edema present.   Lymphadenopathy:      Cervical: No cervical adenopathy.   Skin:     Findings: No lesion or rash.   Neurological:      Mental Status: She is alert.     Result Review   The following data was reviewed by Antoni Gonzalez MD on 03/12/2024.  Lab Results   Component Value Date    WBC 4.83 09/12/2023    HGB 11.2 (L) 09/12/2023    HCT 33.6 (L) 09/12/2023    MCV 93.9 09/12/2023     09/12/2023     Lab Results   Component Value Date    GLUCOSE 98 09/12/2023    BUN 18 09/12/2023    CREATININE 0.97 09/12/2023     09/12/2023    K 4.1 09/12/2023     09/12/2023    CO2 24.1 09/12/2023    CALCIUM 9.6 09/12/2023    PROTEINTOT 7.0 09/12/2023    ALBUMIN 4.3 09/12/2023    ALT 16 09/12/2023    AST 25 09/12/2023    ALKPHOS 44 09/12/2023    BILITOT 0.2 09/12/2023    EGFR 57.4 (L) 09/12/2023    GLOB 2.7 09/12/2023    AGRATIO 1.6 09/12/2023    BCR 18.6 09/12/2023    ANIONGAP 10.9 09/12/2023      No results found for: \"CHOL\", \"CHLPL\", \"TRIG\", \"HDL\", \"LDL\", \"LDLDIRECT\"  Lab Results   Component Value Date    TSH 2.640 09/12/2023     Lab Results   Component Value Date    HGBA1C 5.80 (H) 09/07/2022     BMI is >= 25 and <30. (Overweight) The following options were offered after discussion;: exercise counseling/recommendations and nutrition counseling/recommendations         Assessment and Plan:   Today, we have reviewed her care.  Overall, Violetta is doing well.  Her blood pressure is low normal on the initial check.  We will recheck it manually in both arms before she leaves.  Additionally, we will refill her usual medications and update some blood work today.  Tentative follow-up will be again in about 6 months.  Lorazepam continues to be of benefit for her.  We again discussed some precautions regarding it.  It will be refilled today.    Diagnoses and all orders for this visit:    1. Anxiety (Primary)  -     LORazepam (ATIVAN) 0.5 MG " tablet; Take 1 tablet by mouth 2 (Two) Times a Day As Needed for Anxiety.  Dispense: 30 tablet; Refill: 1    2. Essential hypertension  -     amLODIPine (NORVASC) 10 MG tablet; Take 1 tablet by mouth Every Night.  Dispense: 90 tablet; Refill: 3  -     atenolol (TENORMIN) 50 MG tablet; Take 1 tablet by mouth Every Night.  Dispense: 90 tablet; Refill: 3  -     losartan (COZAAR) 100 MG tablet; Take 1 tablet by mouth Every Night.  Dispense: 90 tablet; Refill: 3  -     Basic Metabolic Panel; Future    3. Gastroesophageal reflux disease without esophagitis  -     omeprazole (priLOSEC) 40 MG capsule; Take 1 capsule by mouth Every Night.  Dispense: 90 capsule; Refill: 3    4. Stage 3a chronic kidney disease  -     CBC (No Diff); Future  -     Basic Metabolic Panel; Future    5. Other long term (current) drug therapy  -     CBC (No Diff); Future    Follow Up  Return in about 6 months (around 9/12/2024) for Recheck, Medicare Wellness.  Patient was given instructions and counseling regarding her condition or for health maintenance advice. Please see specific information pulled into the AVS if appropriate.

## 2024-03-13 LAB — HBA1C MFR BLD: 6.1 % (ref 4.8–5.6)

## 2024-08-23 ENCOUNTER — TELEPHONE (OUTPATIENT)
Dept: FAMILY MEDICINE CLINIC | Age: 84
End: 2024-08-23
Payer: MEDICARE

## 2024-08-23 DIAGNOSIS — Z78.0 POSTMENOPAUSAL: Primary | ICD-10-CM

## 2024-09-23 ENCOUNTER — HOSPITAL ENCOUNTER (OUTPATIENT)
Dept: BONE DENSITY | Facility: HOSPITAL | Age: 84
Discharge: HOME | End: 2024-09-23
Admitting: FAMILY MEDICINE
Payer: MEDICARE

## 2024-09-23 DIAGNOSIS — Z78.0 POSTMENOPAUSAL: ICD-10-CM

## 2024-09-23 PROCEDURE — 77080 DXA BONE DENSITY AXIAL: CPT

## 2024-10-08 ENCOUNTER — TELEPHONE (OUTPATIENT)
Dept: FAMILY MEDICINE CLINIC | Age: 84
End: 2024-10-08
Payer: MEDICARE

## 2024-10-08 DIAGNOSIS — R20.0 NUMBNESS OF LEFT HAND: Primary | ICD-10-CM

## 2024-10-08 DIAGNOSIS — G56.02 CARPAL TUNNEL SYNDROME OF LEFT WRIST: ICD-10-CM

## 2024-10-08 NOTE — TELEPHONE ENCOUNTER
Caller: Violetta Starr    Relationship: Self    Best call back number: [phone]     What was the call regarding: PATIENT STATES SHE WAS DIAGNOSED WITH CARPEL TUNNEL AND HER HAND PAIN HAS BEEN GETTING INCREASINGLY WORSE. PATIENT STATES SHE DIDN'T SCHEDULE AN APPOINTMENT WITH THE HAND SPECIALIST THAT DR GARCIA REFERRED HER TO BECAUSE SHE THOUGHT SHE COULD HANDLE THE PAIN ON HER OWN. PATIENT STATES SHE WOULD LIKE TO KNOW WHAT DR GARCIA RECOMMENDS SHE DO MOVING FORWARD FOR THIS ISSUE.

## 2024-10-08 NOTE — TELEPHONE ENCOUNTER
Please refer to Dr. Payne or Dr. Dobbins at [facility].  I suspect they would be able to help her with it.  Thanks.

## 2024-10-08 NOTE — TELEPHONE ENCOUNTER
Pt would like to move forward with referral, she doesn't want to go to [place]. Would like to know who you recommend she see locally. Please advise.

## 2024-10-18 ENCOUNTER — TELEPHONE (OUTPATIENT)
Dept: FAMILY MEDICINE CLINIC | Age: 84
End: 2024-10-18
Payer: MEDICARE

## 2024-10-18 DIAGNOSIS — Z12.31 ENCOUNTER FOR SCREENING MAMMOGRAM FOR BREAST CANCER: Primary | ICD-10-CM

## 2024-10-22 ENCOUNTER — TRANSCRIBE ORDERS (OUTPATIENT)
Dept: ADMINISTRATIVE | Facility: HOSPITAL | Age: 84
End: 2024-10-22
Payer: MEDICARE

## 2024-10-22 DIAGNOSIS — M25.532 WRIST PAIN, LEFT: Primary | ICD-10-CM

## 2024-11-08 ENCOUNTER — HOSPITAL ENCOUNTER (OUTPATIENT)
Dept: MRI IMAGING | Facility: HOSPITAL | Age: 84
Discharge: HOME | End: 2024-11-08
Payer: MEDICARE

## 2024-11-08 DIAGNOSIS — M25.532 WRIST PAIN, LEFT: ICD-10-CM

## 2024-11-08 PROCEDURE — 73221 MRI JOINT UPR EXTREM W/O DYE: CPT

## 2025-01-02 DIAGNOSIS — F41.9 ANXIETY: ICD-10-CM

## 2025-01-02 RX ORDER — LORAZEPAM 0.5 MG/1
0.5 TABLET ORAL 2 TIMES DAILY PRN
Qty: 30 TABLET | Refills: 0 | OUTPATIENT
Start: 2025-01-02

## 2025-01-02 NOTE — TELEPHONE ENCOUNTER
It looks like she missed a scheduled appointment in October.  Unfortunately, I am not able to refill this medication without seeing her.  Please schedule a follow-up visit at her convenience for us to discuss and refill.  Thanks.

## 2025-01-09 DIAGNOSIS — F41.9 ANXIETY: ICD-10-CM

## 2025-01-09 RX ORDER — LORAZEPAM 0.5 MG/1
0.5 TABLET ORAL 2 TIMES DAILY PRN
Qty: 30 TABLET | Refills: 0 | OUTPATIENT
Start: 2025-01-09

## 2025-01-09 NOTE — TELEPHONE ENCOUNTER
It looks like she missed a scheduled appointment in October.  Unfortunately, I am not able to refill this medication without seeing her.  If she would like, please schedule a follow-up visit at her convenience for us to discuss and refill.  Thanks.

## 2025-01-10 ENCOUNTER — TELEMEDICINE (OUTPATIENT)
Dept: FAMILY MEDICINE CLINIC | Age: 85
End: 2025-01-10
Payer: MEDICARE

## 2025-01-10 DIAGNOSIS — F41.9 ANXIETY: ICD-10-CM

## 2025-01-10 RX ORDER — LORAZEPAM 0.5 MG/1
0.5 TABLET ORAL 2 TIMES DAILY PRN
Qty: 30 TABLET | Refills: 0 | Status: SHIPPED | OUTPATIENT
Start: 2025-01-10

## 2025-01-10 NOTE — PROGRESS NOTES
Violetta was scheduled for a telehealth visit today, but she is not able to do video visit because of the phone that she has.  She is out of lorazepam and would like to get it refilled.  She has an intermittent issue with anxiety for which she takes lorazepam sparingly - maybe once every 2-3 weeks.  She is not aware of obvious side effects related to this.  She denies abuse, diversion, or doctor shopping.  Christiano is reviewed and consistent.  After speaking with her, I do think it is reasonable to send a single refill of the medication.  This will be sent to [facility] for her.  We will review further at her follow-up visit in April.    Diagnoses and all orders for this visit:    1. Anxiety  -     LORazepam (ATIVAN) 0.5 MG tablet; Take 1 tablet by mouth 2 (Two) Times a Day As Needed for Anxiety.  Dispense: 30 tablet; Refill: 0

## 2025-01-28 ENCOUNTER — HOSPITAL ENCOUNTER (OUTPATIENT)
Dept: MAMMOGRAPHY | Facility: HOSPITAL | Age: 85
Discharge: HOME | End: 2025-01-28
Admitting: FAMILY MEDICINE
Payer: MEDICARE

## 2025-01-28 DIAGNOSIS — Z12.31 ENCOUNTER FOR SCREENING MAMMOGRAM FOR BREAST CANCER: ICD-10-CM

## 2025-01-28 PROCEDURE — 77063 BREAST TOMOSYNTHESIS BI: CPT

## 2025-01-28 PROCEDURE — 77067 SCR MAMMO BI INCL CAD: CPT

## 2025-02-25 DIAGNOSIS — K21.9 GASTROESOPHAGEAL REFLUX DISEASE WITHOUT ESOPHAGITIS: ICD-10-CM

## 2025-02-25 DIAGNOSIS — I10 ESSENTIAL HYPERTENSION: ICD-10-CM

## 2025-02-26 RX ORDER — ATENOLOL 50 MG/1
50 TABLET ORAL NIGHTLY
Qty: 90 TABLET | Refills: 0 | Status: SHIPPED | OUTPATIENT
Start: 2025-02-26

## 2025-02-26 RX ORDER — LOSARTAN POTASSIUM 100 MG/1
100 TABLET ORAL NIGHTLY
Qty: 90 TABLET | Refills: 0 | Status: SHIPPED | OUTPATIENT
Start: 2025-02-26

## 2025-02-26 RX ORDER — OMEPRAZOLE 40 MG/1
40 CAPSULE, DELAYED RELEASE ORAL NIGHTLY
Qty: 90 CAPSULE | Refills: 0 | Status: SHIPPED | OUTPATIENT
Start: 2025-02-26

## 2025-04-11 ENCOUNTER — OFFICE VISIT (OUTPATIENT)
Dept: FAMILY MEDICINE CLINIC | Age: 85
End: 2025-04-11
Payer: MEDICARE

## 2025-04-11 ENCOUNTER — LAB (OUTPATIENT)
Dept: LAB | Facility: HOSPITAL | Age: 85
End: 2025-04-11
Payer: MEDICARE

## 2025-04-11 VITALS
WEIGHT: 172.6 LBS | OXYGEN SATURATION: 97 % | HEIGHT: 65 IN | DIASTOLIC BLOOD PRESSURE: 68 MMHG | BODY MASS INDEX: 28.76 KG/M2 | TEMPERATURE: 98.4 F | SYSTOLIC BLOOD PRESSURE: 133 MMHG | HEART RATE: 81 BPM

## 2025-04-11 DIAGNOSIS — N18.31 STAGE 3A CHRONIC KIDNEY DISEASE: ICD-10-CM

## 2025-04-11 DIAGNOSIS — I10 ESSENTIAL HYPERTENSION: ICD-10-CM

## 2025-04-11 DIAGNOSIS — Z79.899 OTHER LONG TERM (CURRENT) DRUG THERAPY: ICD-10-CM

## 2025-04-11 DIAGNOSIS — K21.9 GASTROESOPHAGEAL REFLUX DISEASE WITHOUT ESOPHAGITIS: ICD-10-CM

## 2025-04-11 DIAGNOSIS — C54.1 ENDOMETRIAL CANCER: ICD-10-CM

## 2025-04-11 DIAGNOSIS — R73.9 HYPERGLYCEMIA: ICD-10-CM

## 2025-04-11 DIAGNOSIS — F41.9 ANXIETY: ICD-10-CM

## 2025-04-11 DIAGNOSIS — Z00.00 PHYSICAL EXAM: Primary | ICD-10-CM

## 2025-04-11 LAB
BACTERIA UR QL AUTO: ABNORMAL /HPF
BILIRUB UR QL STRIP: NEGATIVE
CLARITY UR: ABNORMAL
COLOR UR: YELLOW
DEPRECATED RDW RBC AUTO: 41.7 FL (ref 37–54)
ERYTHROCYTE [DISTWIDTH] IN BLOOD BY AUTOMATED COUNT: 12.2 % (ref 12.3–15.4)
GLUCOSE UR STRIP-MCNC: NEGATIVE MG/DL
HBA1C MFR BLD: 5.8 % (ref 4.8–5.6)
HCT VFR BLD AUTO: 37.3 % (ref 34–46.6)
HGB BLD-MCNC: 12.5 G/DL (ref 12–15.9)
HGB UR QL STRIP.AUTO: ABNORMAL
HYALINE CASTS UR QL AUTO: ABNORMAL /LPF
KETONES UR QL STRIP: NEGATIVE
LEUKOCYTE ESTERASE UR QL STRIP.AUTO: ABNORMAL
MCH RBC QN AUTO: 31.2 PG (ref 26.6–33)
MCHC RBC AUTO-ENTMCNC: 33.5 G/DL (ref 31.5–35.7)
MCV RBC AUTO: 93 FL (ref 79–97)
NITRITE UR QL STRIP: NEGATIVE
PH UR STRIP.AUTO: 5.5 [PH] (ref 5–8)
PLATELET # BLD AUTO: 236 10*3/MM3 (ref 140–450)
PMV BLD AUTO: 11.6 FL (ref 6–12)
PROT UR QL STRIP: ABNORMAL
RBC # BLD AUTO: 4.01 10*6/MM3 (ref 3.77–5.28)
RBC # UR STRIP: ABNORMAL /HPF
REF LAB TEST METHOD: ABNORMAL
SP GR UR STRIP: 1.02 (ref 1–1.03)
SQUAMOUS #/AREA URNS HPF: ABNORMAL /HPF
UROBILINOGEN UR QL STRIP: ABNORMAL
WBC # UR STRIP: ABNORMAL /HPF
WBC NRBC COR # BLD AUTO: 8.08 10*3/MM3 (ref 3.4–10.8)

## 2025-04-11 PROCEDURE — 85027 COMPLETE CBC AUTOMATED: CPT

## 2025-04-11 PROCEDURE — 36415 COLL VENOUS BLD VENIPUNCTURE: CPT

## 2025-04-11 PROCEDURE — 81001 URINALYSIS AUTO W/SCOPE: CPT

## 2025-04-11 PROCEDURE — 80053 COMPREHEN METABOLIC PANEL: CPT

## 2025-04-11 PROCEDURE — 83036 HEMOGLOBIN GLYCOSYLATED A1C: CPT

## 2025-04-11 PROCEDURE — 84443 ASSAY THYROID STIM HORMONE: CPT

## 2025-04-11 RX ORDER — AMLODIPINE BESYLATE 10 MG/1
10 TABLET ORAL NIGHTLY
Qty: 90 TABLET | Refills: 3 | Status: SHIPPED | OUTPATIENT
Start: 2025-04-11

## 2025-04-11 RX ORDER — HYDRALAZINE HYDROCHLORIDE 10 MG/1
TABLET, FILM COATED ORAL
Qty: 30 TABLET | Refills: 0 | Status: SHIPPED | OUTPATIENT
Start: 2025-04-11

## 2025-04-11 RX ORDER — LORAZEPAM 0.5 MG/1
0.5 TABLET ORAL 2 TIMES DAILY PRN
Qty: 30 TABLET | Refills: 0 | Status: SHIPPED | OUTPATIENT
Start: 2025-04-11

## 2025-04-11 RX ORDER — OMEPRAZOLE 40 MG/1
40 CAPSULE, DELAYED RELEASE ORAL NIGHTLY
Qty: 90 CAPSULE | Refills: 3 | Status: SHIPPED | OUTPATIENT
Start: 2025-04-11

## 2025-04-11 RX ORDER — LOSARTAN POTASSIUM 100 MG/1
100 TABLET ORAL NIGHTLY
Qty: 90 TABLET | Refills: 3 | Status: SHIPPED | OUTPATIENT
Start: 2025-04-11

## 2025-04-11 RX ORDER — ATENOLOL 50 MG/1
50 TABLET ORAL NIGHTLY
Qty: 90 TABLET | Refills: 3 | Status: SHIPPED | OUTPATIENT
Start: 2025-04-11

## 2025-04-11 NOTE — PROGRESS NOTES
The ABCs of the Annual Wellness Visit  Subsequent Medicare Wellness Visit    Subjective    Violetta Starr is a 84 y.o. patient who presents for a Subsequent Medicare Wellness Visit.    The following portions of the patient's history were reviewed and updated as appropriate: allergies, current medications, past family history, past medical history, past social history, past surgical history, and problem list.    Compared to one year ago, the patient feels her physical health is the same.    Compared to one year ago, the patient feels her mental health is the same.    Recent Hospitalizations:  She was not admitted to the hospital during the last year.     Current Medical Providers:  Patient Care Team:  Antoni Gonzalez MD as PCP - General (Family Medicine)  Ajith Knowles PA-C as Physician Assistant (Physician Assistant)    Outpatient Medications Prior to Visit   Medication Sig Dispense Refill    acetaminophen (TYLENOL) 500 MG tablet Take 2 tablets by mouth Every 6 (Six) Hours As Needed for Mild Pain.      cetirizine (zyrTEC) 10 MG tablet Take 1 tablet by mouth As Needed for Allergies.      amLODIPine (NORVASC) 10 MG tablet Take 1 tablet by mouth Every Night. 90 tablet 3    atenolol (TENORMIN) 50 MG tablet TAKE ONE TABLET BY MOUTH ONCE NIGHTLY 90 tablet 0    hydrALAZINE (APRESOLINE) 10 MG tablet Take 1 tablet by mouth twice a day as needed for systolic blood pressure greater than 160. (Patient taking differently: As Needed. Take 1 tablet by mouth twice a day as needed for systolic blood pressure greater than 160.  HAS NOT TAKEN FOR OVER A YEAR) 90 tablet 3    LORazepam (ATIVAN) 0.5 MG tablet Take 1 tablet by mouth 2 (Two) Times a Day As Needed for Anxiety. 30 tablet 0    losartan (COZAAR) 100 MG tablet TAKE ONE TABLET BY MOUTH ONCE NIGHTLY 90 tablet 0    omeprazole (priLOSEC) 40 MG capsule TAKE ONE CAPSULE BY MOUTH ONCE NIGHTLY 90 capsule 0    Mirabegron ER (Myrbetriq) 25 MG tablet sustained-release 24  "hour 24 hr tablet Take 1 tablet by mouth Daily. (Patient not taking: Reported on 4/11/2025) 90 tablet 3     Facility-Administered Medications Prior to Visit   Medication Dose Route Frequency Provider Last Rate Last Admin    Chlorhexidine Gluconate Cloth 2 % pads   Apply externally Take As Directed Charan Payne MD           No opioid medication identified on active medication list. I have reviewed chart for other potential  high risk medication/s and harmful drug interactions in the elderly.      Aspirin is not on active medication list.  Aspirin use is not indicated based on review of current medical condition/s. Risk of harm outweighs potential benefits.      Patient Active Problem List   Diagnosis    Bilateral chronic knee pain    Primary osteoarthritis of right knee    Primary osteoarthritis of left knee    History of malignant neoplasm of endometrium    Essential hypertension    Endometrial cancer    Anxiety    S/P TKR (total knee replacement), left    GERD (gastroesophageal reflux disease)    Soft tissue swelling    Hip pain    Right-sided low back pain with bilateral sciatica    Status post total knee replacement, right     Advance Care Planning  Advance Directive is on file.  ACP discussion was held with the patient during this visit. Patient has an advance directive in EMR which is still valid.  Her  and the daughter would make decisions if needed.     Objective    Vitals:    04/11/25 1515   BP: 133/68   BP Location: Right arm   Patient Position: Sitting   Pulse: 81   Temp: 98.4 °F (36.9 °C)   TempSrc: Oral   SpO2: 97%   Weight: 78.3 kg (172 lb 9.6 oz)   Height: 165 cm (64.96\")   PainSc: 1    PainLoc: Back     Estimated body mass index is 28.76 kg/m² as calculated from the following:    Height as of this encounter: 165 cm (64.96\").    Weight as of this encounter: 78.3 kg (172 lb 9.6 oz).    BMI is >= 25 and <30. (Overweight) The following options were offered after discussion;: exercise " counseling/recommendations and nutrition counseling/recommendations    Does the patient have evidence of cognitive impairment? No        HEALTH RISK ASSESSMENT    Smoking Status:  Social History     Tobacco Use   Smoking Status Never    Passive exposure: Past   Smokeless Tobacco Never     Alcohol Consumption:  Social History     Substance and Sexual Activity   Alcohol Use Never     Fall Risk Screen:    ARIA Fall Risk Assessment was completed, and patient is at LOW risk for falls.Assessment completed on:2025    Depression Screenin/11/2025     3:15 PM   PHQ-2/PHQ-9 Depression Screening   Little interest or pleasure in doing things Not at all   Feeling down, depressed, or hopeless Not at all   How difficult have these problems made it for you to do your work, take care of things at home, or get along with other people? Not difficult at all       Health Habits and Functional and Cognitive Screenin/11/2025     3:16 PM   Functional & Cognitive Status   Do you have difficulty preparing food and eating? No   Do you have difficulty bathing yourself, getting dressed or grooming yourself? No   Do you have difficulty using the toilet? No   Do you have difficulty moving around from place to place? No   Do you have trouble with steps or getting out of a bed or a chair? No   Current Diet Well Balanced Diet   Dental Exam Up to date   Eye Exam Not up to date   Exercise (times per week) 0 times per week   Current Exercises Include No Regular Exercise   Do you need help using the phone?  No   Are you deaf or do you have serious difficulty hearing?  No   Do you need help to go to places out of walking distance? No   Do you need help shopping? No   Do you need help preparing meals?  No   Do you need help with housework?  No   Do you need help with laundry? No   Do you need help taking your medications? No   Do you need help managing money? No   Do you ever drive or ride in a car without wearing a seat belt? No    Have you felt unusual stress, anger or loneliness in the last month? No   Who do you live with? Spouse   If you need help, do you have trouble finding someone available to you? No   Have you been bothered in the last four weeks by sexual problems? No   Do you have difficulty concentrating, remembering or making decisions? No       Age-appropriate Screening Schedule:  Refer to the list below for future screening recommendations based on patient's age, sex and/or medical conditions. Orders for these recommended tests are listed in the plan section. The patient has been provided with a written plan.    Health Maintenance   Topic Date Due    TDAP/TD VACCINES (1 - Tdap) Never done    RSV Vaccine - Adults (1 - 1-dose 75+ series) Never done    COVID-19 Vaccine (7 - 2024-25 season) 04/18/2025    INFLUENZA VACCINE  07/01/2025    ANNUAL WELLNESS VISIT  04/11/2026    DXA SCAN  09/23/2026    Pneumococcal Vaccine 50+  Completed    ZOSTER VACCINE  Completed          CMS Preventative Services Quick Reference  Risk Factors Identified During Encounter  Depression/Dysphoria: Current medication is effective, no change recommended  Immunizations Discussed/Encouraged: Tdap and RSV (Respiratory Syncytial Virus)  The above risks/problems have been discussed with the patient.  Pertinent information has been shared with the patient in the After Visit Summary.  An After Visit Summary and PPPS were made available to the patient.    Follow Up:   Next Medicare Wellness visit to be scheduled in 1 year.     Additional E&M Note during same encounter follows:  Patient has multiple medical problems which are significant and separately identifiable that require additional work above and beyond the Medicare Wellness Visit.      Chief Complaint:  Anxiety, blood pressure    Subjective    History of Present Illness:  In addition to the Medicare wellness exam, Violetta is also here for follow up on her usual care.  She has an intermittent issue with anxiety  "for which she takes lorazepam sparingly - maybe once every 2-3 weeks.  She is not aware of obvious side effects related to this.  She denies abuse, diversion, or doctor shopping.  Christiano is reviewed.      Violetta also has hypertension for which she remains on multiple medications as noted below.  She takes her blood pressure medications at night and states that her blood pressure has been fairly well controlled.  He has not had any recent difficulty with the blood pressure shooting up.  She has previously used hydralazine for this as needed.    Violetta has noted an increase in low back pain over the last 6 months.  This is more in the left buttocks area.  She does have some radiating pain that goes down the lateral aspect of the left lower extremity.  She did have a minimally invasive lumbar laminectomy approximately 3 years ago.  This feels somewhat different than what she experienced before though.    Review of Systems:  Review of Systems   Constitutional:  Negative for chills and fever.   Respiratory:  Positive for shortness of breath (Going up the stairs). Negative for cough.    Cardiovascular:  Negative for chest pain and palpitations.   Gastrointestinal:  Negative for abdominal pain, nausea and vomiting.      Objective   Vital Signs:  /68 (BP Location: Right arm, Patient Position: Sitting)   Pulse 81   Temp 98.4 °F (36.9 °C) (Oral)   Ht 165 cm (64.96\")   Wt 78.3 kg (172 lb 9.6 oz)   SpO2 97%   BMI 28.76 kg/m²     Physical Exam  Vitals and nursing note reviewed.   Constitutional:       General: She is not in acute distress.     Appearance: She is not ill-appearing.   HENT:      Right Ear: Tympanic membrane and ear canal normal.      Left Ear: Tympanic membrane and ear canal normal.      Ears:      Comments: Hearing is normal with forced whisper bilaterally.     Mouth/Throat:      Mouth: Mucous membranes are moist.      Comments: Pharynx appears normal  Eyes:      Extraocular Movements: Extraocular " "movements intact.      Pupils: Pupils are equal, round, and reactive to light.      Comments: Binocular vision is 20/20 with correction.   Neck:      Thyroid: No thyromegaly.   Cardiovascular:      Rate and Rhythm: Normal rate and regular rhythm.      Heart sounds: No murmur heard.  Pulmonary:      Effort: Pulmonary effort is normal.      Breath sounds: Normal breath sounds.   Abdominal:      General: There is no distension.      Palpations: Abdomen is soft. There is no mass.      Tenderness: There is no abdominal tenderness.   Musculoskeletal:         General: Tenderness (Left SI region) present.      Cervical back: Normal range of motion.      Right lower leg: No edema.      Left lower leg: No edema.   Skin:     Findings: No lesion or rash.   Neurological:      General: No focal deficit present.      Mental Status: She is oriented to person, place, and time.      Cranial Nerves: No cranial nerve deficit.      Motor: No weakness.      Coordination: Coordination normal.      Gait: Gait normal.   Psychiatric:         Mood and Affect: Mood normal.     The following data was reviewed by Antoni Gonzalez MD on 04/11/2025.  Lab Results   Component Value Date    WBC 4.75 03/12/2024    HGB 11.9 (L) 03/12/2024    HCT 36.2 03/12/2024    MCV 95.3 03/12/2024     03/12/2024     Lab Results   Component Value Date    GLUCOSE 121 (H) 03/12/2024    BUN 17 03/12/2024    CREATININE 1.02 (H) 03/12/2024     03/12/2024    K 4.0 03/12/2024     03/12/2024    CALCIUM 9.5 03/12/2024    PROTEINTOT 7.0 09/12/2023    ALBUMIN 4.3 09/12/2023    ALT 16 09/12/2023    AST 25 09/12/2023    ALKPHOS 44 09/12/2023    BILITOT 0.2 09/12/2023    GLOB 2.7 09/12/2023    AGRATIO 1.6 09/12/2023    BCR 16.7 03/12/2024    ANIONGAP 13.3 03/12/2024    EGFR 54.0 (L) 03/12/2024     No results found for: \"CHOL\", \"CHLPL\", \"TRIG\", \"HDL\", \"LDL\", \"LDLDIRECT\"  Lab Results   Component Value Date    TSH 2.640 09/12/2023     Lab Results   Component " Value Date    HGBA1C 6.10 (H) 03/12/2024             Assessment and Plan:   Today, we have reviewed her care.  Overall, Violetta as well.  Regarding the Medicare wellness exam, she is currently up-to-date on recommended cancer screenings.  She does not wish to pursue or consider colon cancer screening at this time.  We also reviewed vaccines as noted above.    Regarding her usual care, her problems are stable overall.  Her blood pressure has been doing well since changing her dosing time to night for her medications.  She has really not needed hydralazine for a long period of time.  We will plan to refill it today though for as needed use.  She takes lorazepam on a sparing basis, and it will be refilled for her today.  Otherwise, we will tentatively plan to see her back in about a year, sooner if needed.    Diagnoses and all orders for this visit:    1. Physical exam (Primary)    2. Essential hypertension  -     amLODIPine (NORVASC) 10 MG tablet; Take 1 tablet by mouth Every Night.  Dispense: 90 tablet; Refill: 3  -     atenolol (TENORMIN) 50 MG tablet; Take 1 tablet by mouth Every Night.  Dispense: 90 tablet; Refill: 3  -     hydrALAZINE (APRESOLINE) 10 MG tablet; Take 1 tablet by mouth twice a day as needed for systolic blood pressure greater than 160.  Dispense: 30 tablet; Refill: 0  -     losartan (COZAAR) 100 MG tablet; Take 1 tablet by mouth Every Night.  Dispense: 90 tablet; Refill: 3  -     Comprehensive Metabolic Panel; Future  -     Urinalysis With Microscopic - Urine, Clean Catch; Future    3. Stage 3a chronic kidney disease  -     CBC (No Diff); Future  -     Comprehensive Metabolic Panel; Future  -     Urinalysis With Microscopic - Urine, Clean Catch; Future    4. Anxiety  -     LORazepam (ATIVAN) 0.5 MG tablet; Take 1 tablet by mouth 2 (Two) Times a Day As Needed for Anxiety.  Dispense: 30 tablet; Refill: 0    5. Gastroesophageal reflux disease without esophagitis  -     omeprazole (priLOSEC) 40 MG  capsule; Take 1 capsule by mouth Every Night.  Dispense: 90 capsule; Refill: 3    6. Endometrial cancer  Comments:  This has resolved.    7. Hyperglycemia  -     Hemoglobin A1c; Future    8. Other long term (current) drug therapy  -     CBC (No Diff); Future  -     TSH; Future       Follow Up  Return in about 1 year (around 4/11/2026) for Recheck, Medicare Wellness.  Patient was given instructions and counseling regarding her condition or for health maintenance advice. Please see specific information pulled into the AVS if appropriate.

## 2025-04-12 ENCOUNTER — RESULTS FOLLOW-UP (OUTPATIENT)
Dept: FAMILY MEDICINE CLINIC | Age: 85
End: 2025-04-12
Payer: MEDICARE

## 2025-04-12 LAB
ALBUMIN SERPL-MCNC: 4.3 G/DL (ref 3.5–5.2)
ALBUMIN/GLOB SERPL: 1.3 G/DL
ALP SERPL-CCNC: 66 U/L (ref 39–117)
ALT SERPL W P-5'-P-CCNC: 11 U/L (ref 1–33)
ANION GAP SERPL CALCULATED.3IONS-SCNC: 11.3 MMOL/L (ref 5–15)
AST SERPL-CCNC: 24 U/L (ref 1–32)
BILIRUB SERPL-MCNC: 0.2 MG/DL (ref 0–1.2)
BUN SERPL-MCNC: 15 MG/DL (ref 8–23)
BUN/CREAT SERPL: 14 (ref 7–25)
CALCIUM SPEC-SCNC: 9.8 MG/DL (ref 8.6–10.5)
CHLORIDE SERPL-SCNC: 104 MMOL/L (ref 98–107)
CO2 SERPL-SCNC: 26.7 MMOL/L (ref 22–29)
CREAT SERPL-MCNC: 1.07 MG/DL (ref 0.57–1)
EGFRCR SERPLBLD CKD-EPI 2021: 50.7 ML/MIN/1.73
GLOBULIN UR ELPH-MCNC: 3.3 GM/DL
GLUCOSE SERPL-MCNC: 94 MG/DL (ref 65–99)
POTASSIUM SERPL-SCNC: 4.1 MMOL/L (ref 3.5–5.2)
PROT SERPL-MCNC: 7.6 G/DL (ref 6–8.5)
SODIUM SERPL-SCNC: 142 MMOL/L (ref 136–145)
TSH SERPL DL<=0.05 MIU/L-ACNC: 2.11 UIU/ML (ref 0.27–4.2)

## 2025-04-12 NOTE — LETTER
Violetta Starr  [address] [facility] [place] [address]    2025     Good morning, Violetta.  It was good to see you yesterday.  Please be aware the following regarding her testin.  The random glucose is 94.  The A1c is 5.8% which is down from 6.1% on the last check.  This means that your average blood sugar over the last 90 days has been around 115.  There is no evidence for type 2 diabetes, and I would recommend no change in your care.  2.  The creatinine, the main measure of kidney function, is 1.07 which is up slightly from 1.02 on the last check.  In reviewing her chart, this level is relatively stable when compared to your numbers over the last 3 years.  It likely reflects some mild decline in the filtering function of the kidneys, but there is no particular change I would recommend.  Having said that, be sure to take in adequate fluids day-to-day and avoid most over-the-counter medications, especially NSAIDs such as ibuprofen and naproxen.  3.  The urine shows a large amount of white blood cells.  There is no specific evidence for infection otherwise, and it was not the best quality specimen.  This finding is probably not of concern.  As a precaution though, we will reach out to you in about 6 weeks to recheck a urinalysis.  If there is a persistent finding of a large amount of white blood cells in the urine, we may consider additional evaluation.  4.  The rest of your labs are basically normal including tests for the electrolytes, liver, thyroid, and blood counts.     Otherwise, continue your current care and medications, and plan to see me again in about a year as scheduled, sooner if needed.  Let me know if you have other concerns.     Antoni Gonzalez MD  [facility]  Resulted Orders   CBC (No Diff)   Result Value Ref Range    WBC 8.08 3.40 - 10.80 10*3/mm3    RBC 4.01 3.77 - 5.28 10*6/mm3    Hemoglobin 12.5 12.0 - 15.9 g/dL    Hematocrit 37.3 34.0 - 46.6 %    MCV 93.0 79.0 - 97.0  fL    MCH 31.2 26.6 - 33.0 pg    MCHC 33.5 31.5 - 35.7 g/dL    RDW 12.2 (L) 12.3 - 15.4 %    RDW-SD 41.7 37.0 - 54.0 fl    MPV 11.6 6.0 - 12.0 fL    Platelets 236 140 - 450 10*3/mm3   Comprehensive Metabolic Panel   Result Value Ref Range    Glucose 94 65 - 99 mg/dL    BUN 15 8 - 23 mg/dL    Creatinine 1.07 (H) 0.57 - 1.00 mg/dL    Sodium 142 136 - 145 mmol/L    Potassium 4.1 3.5 - 5.2 mmol/L    Chloride 104 98 - 107 mmol/L    CO2 26.7 22.0 - 29.0 mmol/L    Calcium 9.8 8.6 - 10.5 mg/dL    Total Protein 7.6 6.0 - 8.5 g/dL    Albumin 4.3 3.5 - 5.2 g/dL    ALT (SGPT) 11 1 - 33 U/L    AST (SGOT) 24 1 - 32 U/L    Alkaline Phosphatase 66 39 - 117 U/L    Total Bilirubin 0.2 0.0 - 1.2 mg/dL    Globulin 3.3 gm/dL    A/G Ratio 1.3 g/dL    BUN/Creatinine Ratio 14.0 7.0 - 25.0    Anion Gap 11.3 5.0 - 15.0 mmol/L    eGFR 50.7 (L) >60.0 mL/min/1.73   TSH   Result Value Ref Range    TSH 2.110 0.270 - 4.200 uIU/mL   Hemoglobin A1c   Result Value Ref Range    Hemoglobin A1C 5.80 (H) 4.80 - 5.60 %   Urinalysis without microscopic (no culture) - Urine, Clean Catch   Result Value Ref Range    Color, UA Yellow Yellow, Straw    Appearance, UA Cloudy (A) Clear    pH, UA 5.5 5.0 - 8.0    Specific Gravity, UA 1.021 1.005 - 1.030    Glucose, UA Negative Negative    Ketones, UA Negative Negative    Bilirubin, UA Negative Negative    Blood, UA Trace (A) Negative    Protein, UA Trace (A) Negative    Leuk Esterase, UA Moderate (2+) (A) Negative    Nitrite, UA Negative Negative    Urobilinogen, UA 0.2 E.U./dL 0.2 - 1.0 E.U./dL   Urinalysis, Microscopic Only - Urine, Clean Catch   Result Value Ref Range    RBC, UA 0-2 None Seen, 0-2 /HPF    WBC, UA Too Numerous to Count (A) None Seen, 0-2 /HPF    Bacteria, UA None Seen None Seen /HPF    Squamous Epithelial Cells, UA 7-12 (A) None Seen, 0-2 /HPF    Hyaline Casts, UA 0-2 None Seen /LPF    Methodology Automated Microscopy

## 2025-04-22 ENCOUNTER — TELEPHONE (OUTPATIENT)
Dept: FAMILY MEDICINE CLINIC | Age: 85
End: 2025-04-22
Payer: MEDICARE

## 2025-04-22 DIAGNOSIS — M54.42 CHRONIC LEFT-SIDED LOW BACK PAIN WITH LEFT-SIDED SCIATICA: Primary | ICD-10-CM

## 2025-04-22 DIAGNOSIS — G89.29 CHRONIC LEFT-SIDED LOW BACK PAIN WITH LEFT-SIDED SCIATICA: Primary | ICD-10-CM

## 2025-04-22 NOTE — TELEPHONE ENCOUNTER
Caller: Violetta Starr    Relationship: Self    Best call back number: [phone]    What orders are you requesting (i.e. lab or imaging): BACK X-RAY    In what timeframe would the patient need to come in: AS SOON AS POSSIBLE    Where will you receive your lab/imaging services: [facility]     Additional notes: THIS WAS DISCUSSED AT LAST APPOINTMENT BUT NO ORDERS WERE ENTERED  PLEASE CONTACT AND ADVISE WHEN ORDERS ARE ENTERED

## 2025-04-23 ENCOUNTER — HOSPITAL ENCOUNTER (OUTPATIENT)
Dept: GENERAL RADIOLOGY | Facility: HOSPITAL | Age: 85
Discharge: HOME | End: 2025-04-23
Admitting: FAMILY MEDICINE
Payer: MEDICARE

## 2025-04-23 DIAGNOSIS — G89.29 CHRONIC LEFT-SIDED LOW BACK PAIN WITH LEFT-SIDED SCIATICA: ICD-10-CM

## 2025-04-23 DIAGNOSIS — M54.42 CHRONIC LEFT-SIDED LOW BACK PAIN WITH LEFT-SIDED SCIATICA: ICD-10-CM

## 2025-04-23 PROCEDURE — 72100 X-RAY EXAM L-S SPINE 2/3 VWS: CPT

## 2025-04-25 ENCOUNTER — RESULTS FOLLOW-UP (OUTPATIENT)
Dept: FAMILY MEDICINE CLINIC | Age: 85
End: 2025-04-25
Payer: MEDICARE

## 2025-04-25 DIAGNOSIS — M43.16 ANTEROLISTHESIS OF LUMBAR SPINE: ICD-10-CM

## 2025-04-25 DIAGNOSIS — M51.362 DEGENERATION OF INTERVERTEBRAL DISC OF LUMBAR REGION WITH DISCOGENIC BACK PAIN AND LOWER EXTREMITY PAIN: ICD-10-CM

## 2025-04-25 DIAGNOSIS — M54.42 CHRONIC LEFT-SIDED LOW BACK PAIN WITH LEFT-SIDED SCIATICA: Primary | ICD-10-CM

## 2025-04-25 DIAGNOSIS — M47.816 LUMBAR FACET ARTHROPATHY: ICD-10-CM

## 2025-04-25 DIAGNOSIS — Z98.890 HISTORY OF BACK SURGERY: ICD-10-CM

## 2025-04-25 DIAGNOSIS — G89.29 CHRONIC LEFT-SIDED LOW BACK PAIN WITH LEFT-SIDED SCIATICA: Primary | ICD-10-CM

## 2025-05-15 ENCOUNTER — HOSPITAL ENCOUNTER (OUTPATIENT)
Dept: MRI IMAGING | Facility: HOSPITAL | Age: 85
Discharge: HOME | End: 2025-05-15
Admitting: FAMILY MEDICINE
Payer: MEDICARE

## 2025-05-15 DIAGNOSIS — Z98.890 HISTORY OF BACK SURGERY: ICD-10-CM

## 2025-05-15 DIAGNOSIS — M43.16 ANTEROLISTHESIS OF LUMBAR SPINE: ICD-10-CM

## 2025-05-15 DIAGNOSIS — M51.362 DEGENERATION OF INTERVERTEBRAL DISC OF LUMBAR REGION WITH DISCOGENIC BACK PAIN AND LOWER EXTREMITY PAIN: ICD-10-CM

## 2025-05-15 DIAGNOSIS — M54.42 CHRONIC LEFT-SIDED LOW BACK PAIN WITH LEFT-SIDED SCIATICA: ICD-10-CM

## 2025-05-15 DIAGNOSIS — G89.29 CHRONIC LEFT-SIDED LOW BACK PAIN WITH LEFT-SIDED SCIATICA: ICD-10-CM

## 2025-05-15 DIAGNOSIS — M47.816 LUMBAR FACET ARTHROPATHY: ICD-10-CM

## 2025-05-15 PROCEDURE — 72148 MRI LUMBAR SPINE W/O DYE: CPT

## 2025-05-17 ENCOUNTER — RESULTS FOLLOW-UP (OUTPATIENT)
Dept: FAMILY MEDICINE CLINIC | Age: 85
End: 2025-05-17
Payer: MEDICARE

## 2025-05-27 ENCOUNTER — TELEPHONE (OUTPATIENT)
Dept: FAMILY MEDICINE CLINIC | Age: 85
End: 2025-05-27
Payer: MEDICARE

## 2025-05-27 DIAGNOSIS — R82.81 PYURIA: Primary | ICD-10-CM

## 2025-05-27 NOTE — TELEPHONE ENCOUNTER
----- Message from Amanda MANZANARES sent at 4/14/2025 10:01 AM EDT -----  TICKLE urinalysis with micro 6 weeks for pyuria

## 2025-05-28 ENCOUNTER — RESULTS FOLLOW-UP (OUTPATIENT)
Dept: LAB | Facility: HOSPITAL | Age: 85
End: 2025-05-28
Payer: MEDICARE

## 2025-05-28 ENCOUNTER — LAB (OUTPATIENT)
Dept: LAB | Facility: HOSPITAL | Age: 85
End: 2025-05-28
Payer: MEDICARE

## 2025-05-28 DIAGNOSIS — R82.81 PYURIA: ICD-10-CM

## 2025-05-28 LAB
BACTERIA UR QL AUTO: ABNORMAL /HPF
BILIRUB UR QL STRIP: NEGATIVE
CLARITY UR: CLEAR
COLOR UR: YELLOW
GLUCOSE UR STRIP-MCNC: NEGATIVE MG/DL
HGB UR QL STRIP.AUTO: NEGATIVE
KETONES UR QL STRIP: NEGATIVE
LEUKOCYTE ESTERASE UR QL STRIP.AUTO: ABNORMAL
MUCOUS THREADS URNS QL MICRO: ABNORMAL /HPF
NITRITE UR QL STRIP: NEGATIVE
PH UR STRIP.AUTO: 5.5 [PH] (ref 5–8)
PROT UR QL STRIP: ABNORMAL
RBC # UR STRIP: ABNORMAL /HPF
REF LAB TEST METHOD: ABNORMAL
SP GR UR STRIP: 1.02 (ref 1–1.03)
SQUAMOUS #/AREA URNS HPF: ABNORMAL /HPF
UROBILINOGEN UR QL STRIP: ABNORMAL
WBC # UR STRIP: ABNORMAL /HPF

## 2025-05-28 PROCEDURE — 81001 URINALYSIS AUTO W/SCOPE: CPT

## 2025-08-02 DIAGNOSIS — I10 ESSENTIAL HYPERTENSION: ICD-10-CM

## 2025-08-04 RX ORDER — HYDRALAZINE HYDROCHLORIDE 10 MG/1
10 TABLET, FILM COATED ORAL 2 TIMES DAILY PRN
Qty: 30 TABLET | Refills: 2 | Status: SHIPPED | OUTPATIENT
Start: 2025-08-04

## 2025-08-26 DIAGNOSIS — F41.9 ANXIETY: ICD-10-CM

## 2025-08-27 RX ORDER — LORAZEPAM 0.5 MG/1
0.5 TABLET ORAL 2 TIMES DAILY PRN
Qty: 30 TABLET | Refills: 0 | Status: SHIPPED | OUTPATIENT
Start: 2025-08-27

## (undated) DEVICE — DRP MICROSCP LECIA W/CLEARLENS 137X381CM

## (undated) DEVICE — BNDG ELAS ELITE V/CLOSE 6IN 5YD LF STRL

## (undated) DEVICE — BNDG ELAS CO-FLEX SLF ADHR 6IN 5YD LF STRL

## (undated) DEVICE — MAT FLR ABSORBENT LG 4FT 10 2.5FT

## (undated) DEVICE — LAMINECTOMY CERVICAL DISC-LF: Brand: MEDLINE INDUSTRIES, INC.

## (undated) DEVICE — NEEDLE, QUINCKE, 18GX3.5": Brand: MEDLINE

## (undated) DEVICE — PK KN TOTL 40

## (undated) DEVICE — GLV SURG PREMIERPRO ORTHO LTX PF SZ9 BRN

## (undated) DEVICE — PICO 7 10CM X 30CM: Brand: PICO™ 7

## (undated) DEVICE — SUT VIC 2/0 CT2 27IN J269H

## (undated) DEVICE — TRAP FLD MINIVAC MEGADYNE 100ML

## (undated) DEVICE — TBG PENCL TELESCP MEGADYNE SMOKE EVAC 10FT

## (undated) DEVICE — SUT VIC 0/0 UR6 27IN DYED J603H

## (undated) DEVICE — APPL CHLORAPREP HI/LITE 26ML ORNG

## (undated) DEVICE — ANTIBACTERIAL UNDYED BRAIDED (POLYGLACTIN 910), SYNTHETIC ABSORBABLE SUTURE: Brand: COATED VICRYL

## (undated) DEVICE — PENCL E/S HNDSWCH ROCKR CB

## (undated) DEVICE — DRP ROBOTIC ROSA BX/20

## (undated) DEVICE — MEDI-VAC NON-CONDUCTIVE SUCTION TUBING: Brand: CARDINAL HEALTH

## (undated) DEVICE — GAMMEX® NON-LATEX SIZE 7.5, STERILE NEOPRENE POWDER-FREE SURGICAL GLOVE: Brand: GAMMEX

## (undated) DEVICE — GLV SURG SIGNATURE ESSENTIAL PF LTX SZ8.5

## (undated) DEVICE — GLV SURG BIOGEL LTX PF 7 1/2

## (undated) DEVICE — DRAPE,C-SECTION,CLR SCREEN,WIRE: Brand: MEDLINE

## (undated) DEVICE — INTEGUSEAL MICROBIAL SEALANT: Brand: AVANOS

## (undated) DEVICE — STERILE POLYISOPRENE POWDER-FREE SURGICAL GLOVES WITH EMOLLIENT COATING: Brand: PROTEXIS

## (undated) DEVICE — DUAL CUT SAGITTAL BLADE

## (undated) DEVICE — SOL IRR NACL 0.9PCT 3000ML

## (undated) DEVICE — STERILE PATIENT PROTECTIVE PAD FOR IMP® KNEE POSITIONERS & COHESIVE WRAP (10 / CASE): Brand: DE MAYO KNEE POSITIONER®

## (undated) DEVICE — UNDERCAST PADDING: Brand: DEROYAL

## (undated) DEVICE — STERILE POLYISOPRENE POWDER-FREE SURGICAL GLOVES: Brand: PROTEXIS

## (undated) DEVICE — SLV SCD KN/LEN ADJ EXPRSS BLENDED MD 1P/U